# Patient Record
Sex: MALE | Race: WHITE | NOT HISPANIC OR LATINO | Employment: OTHER | URBAN - NONMETROPOLITAN AREA
[De-identification: names, ages, dates, MRNs, and addresses within clinical notes are randomized per-mention and may not be internally consistent; named-entity substitution may affect disease eponyms.]

---

## 2017-05-11 ENCOUNTER — DOCUMENTATION (OUTPATIENT)
Dept: GASTROENTEROLOGY | Facility: CLINIC | Age: 62
End: 2017-05-11

## 2017-08-13 ENCOUNTER — HOSPITAL ENCOUNTER (OUTPATIENT)
Facility: HOSPITAL | Age: 62
Setting detail: OBSERVATION
Discharge: HOME | End: 2017-08-15
Attending: INTERNAL MEDICINE | Admitting: FAMILY MEDICINE

## 2017-08-13 DIAGNOSIS — E78.2 MIXED HYPERLIPIDEMIA: Primary | ICD-10-CM

## 2017-08-13 DIAGNOSIS — E78.5 HYPERLIPIDEMIA LDL GOAL <100: ICD-10-CM

## 2017-08-13 DIAGNOSIS — I47.29 NONSUSTAINED VENTRICULAR TACHYCARDIA (HCC): ICD-10-CM

## 2017-08-13 LAB
ALBUMIN SERPL-MCNC: 4.3 G/DL (ref 3.2–4.8)
ALBUMIN/GLOB SERPL: 1.5 G/DL (ref 1.5–2.5)
ALP SERPL-CCNC: 98 U/L (ref 25–100)
ALT SERPL W P-5'-P-CCNC: 38 U/L (ref 7–40)
ANION GAP SERPL CALCULATED.3IONS-SCNC: 7 MMOL/L (ref 3–11)
APTT PPP: 28.7 SECONDS (ref 24–31)
AST SERPL-CCNC: 42 U/L (ref 0–33)
BASOPHILS # BLD AUTO: 0.03 10*3/MM3 (ref 0–0.2)
BASOPHILS NFR BLD AUTO: 0.3 % (ref 0–1)
BILIRUB SERPL-MCNC: 0.4 MG/DL (ref 0.3–1.2)
BUN BLD-MCNC: 14 MG/DL (ref 9–23)
BUN/CREAT SERPL: 15.6 (ref 7–25)
CALCIUM SPEC-SCNC: 9.4 MG/DL (ref 8.7–10.4)
CHLORIDE SERPL-SCNC: 104 MMOL/L (ref 99–109)
CO2 SERPL-SCNC: 28 MMOL/L (ref 20–31)
CREAT BLD-MCNC: 0.9 MG/DL (ref 0.6–1.3)
DEPRECATED RDW RBC AUTO: 45 FL (ref 37–54)
EOSINOPHIL # BLD AUTO: 0.19 10*3/MM3 (ref 0–0.3)
EOSINOPHIL NFR BLD AUTO: 1.9 % (ref 0–3)
ERYTHROCYTE [DISTWIDTH] IN BLOOD BY AUTOMATED COUNT: 12.8 % (ref 11.3–14.5)
GFR SERPL CREATININE-BSD FRML MDRD: 86 ML/MIN/1.73
GLOBULIN UR ELPH-MCNC: 2.8 GM/DL
GLUCOSE BLD-MCNC: 100 MG/DL (ref 70–100)
HCT VFR BLD AUTO: 46.5 % (ref 38.9–50.9)
HGB BLD-MCNC: 16.3 G/DL (ref 13.1–17.5)
IMM GRANULOCYTES # BLD: 0.05 10*3/MM3 (ref 0–0.03)
IMM GRANULOCYTES NFR BLD: 0.5 % (ref 0–0.6)
INR PPP: 0.98
LYMPHOCYTES # BLD AUTO: 2.93 10*3/MM3 (ref 0.6–4.8)
LYMPHOCYTES NFR BLD AUTO: 28.5 % (ref 24–44)
MAGNESIUM SERPL-MCNC: 2.2 MG/DL (ref 1.3–2.7)
MCH RBC QN AUTO: 33.4 PG (ref 27–31)
MCHC RBC AUTO-ENTMCNC: 35.1 G/DL (ref 32–36)
MCV RBC AUTO: 95.3 FL (ref 80–99)
MONOCYTES # BLD AUTO: 0.75 10*3/MM3 (ref 0–1)
MONOCYTES NFR BLD AUTO: 7.3 % (ref 0–12)
NEUTROPHILS # BLD AUTO: 6.32 10*3/MM3 (ref 1.5–8.3)
NEUTROPHILS NFR BLD AUTO: 61.5 % (ref 41–71)
PLATELET # BLD AUTO: 78 10*3/MM3 (ref 150–450)
POTASSIUM BLD-SCNC: 4 MMOL/L (ref 3.5–5.5)
PROT SERPL-MCNC: 7.1 G/DL (ref 5.7–8.2)
PROTHROMBIN TIME: 10.7 SECONDS (ref 9.6–11.5)
RBC # BLD AUTO: 4.88 10*6/MM3 (ref 4.2–5.76)
SODIUM BLD-SCNC: 139 MMOL/L (ref 132–146)
TROPONIN I SERPL-MCNC: <0.006 NG/ML
WBC NRBC COR # BLD: 10.27 10*3/MM3 (ref 3.5–10.8)

## 2017-08-13 PROCEDURE — 83880 ASSAY OF NATRIURETIC PEPTIDE: CPT | Performed by: INTERNAL MEDICINE

## 2017-08-13 PROCEDURE — 84484 ASSAY OF TROPONIN QUANT: CPT | Performed by: INTERNAL MEDICINE

## 2017-08-13 PROCEDURE — 93005 ELECTROCARDIOGRAM TRACING: CPT | Performed by: INTERNAL MEDICINE

## 2017-08-13 PROCEDURE — 93010 ELECTROCARDIOGRAM REPORT: CPT | Performed by: INTERNAL MEDICINE

## 2017-08-13 PROCEDURE — 80053 COMPREHEN METABOLIC PANEL: CPT | Performed by: INTERNAL MEDICINE

## 2017-08-13 PROCEDURE — 83735 ASSAY OF MAGNESIUM: CPT | Performed by: INTERNAL MEDICINE

## 2017-08-13 PROCEDURE — 80061 LIPID PANEL: CPT | Performed by: INTERNAL MEDICINE

## 2017-08-13 PROCEDURE — 85025 COMPLETE CBC W/AUTO DIFF WBC: CPT | Performed by: INTERNAL MEDICINE

## 2017-08-13 PROCEDURE — 85730 THROMBOPLASTIN TIME PARTIAL: CPT | Performed by: INTERNAL MEDICINE

## 2017-08-13 PROCEDURE — 85610 PROTHROMBIN TIME: CPT | Performed by: INTERNAL MEDICINE

## 2017-08-13 PROCEDURE — 85007 BL SMEAR W/DIFF WBC COUNT: CPT | Performed by: INTERNAL MEDICINE

## 2017-08-13 PROCEDURE — 84443 ASSAY THYROID STIM HORMONE: CPT | Performed by: INTERNAL MEDICINE

## 2017-08-13 RX ORDER — OXYCODONE AND ACETAMINOPHEN 7.5; 325 MG/1; MG/1
1 TABLET ORAL 2 TIMES DAILY
COMMUNITY
End: 2023-03-31

## 2017-08-13 NOTE — NURSING NOTE
"  ACC REVIEW REPORT: [facility]        PATIENT NAME: Hai Robbins    PATIENT ID: [id number]    BED: S 464    BED TYPE: telemetry    BED GIVEN TO: instructed to go to ER registration    TIME BED GIVEN: n/a    YOB: 1955    AGE: 62    GENDER: M    PREVIOUS ADMIT TO [facility]: no    PREVIOUS ADMISSION DATE: no    PATIENT CLASS:     TODAY'S DATE: 8/13/2017    TRANSFER DATE: direct admit    ETA: pt coming by private vehicle    TRANSFERRING FACILITY: [facility]    TRANSFERRING FACILITY PHONE # : [phone]    TRANSFERRING MD: Arlet PALACIOS    DATE/TIME REQUEST RECEIVED: 8-13-17@2431    [facility] RN: Shireen Mooney    REPORT FROM: Jamia    TIME REPORT TAKEN: 1400    DIAGNOSIS: NSVT    REASON FOR TRANSFER TO [facility]: pt request    TRANSPORTATION: private vehicle    CLINICAL REASON FOR TRANSFER TO [facility]: 62 y.o. Presented to [facility] with c/o palpitations - found to have 18 beat run of VT - had two negative troponins  Was transferred to [facility] 8/11 - pt has not had any VT or palpitations since being there  Their cardiologist recommend pt have a heart cath and pt is requesting to come to [facility]      CLINICAL INFORMATION    HEIGHT:     WEIGHT:     ALLERGIES: NKA    ELDER: no    INFECTIOUS DISEASE: no    ISOLATION: no    LAST VITAL SIGNS:  TIME: 0900  TEMP: \"afebrile\"  PULSE:65  B/P: 139/80  RESP:     LAB INFORMATION: no abnormal labs    CULTURE INFORMATION: n/a    MEDS/IV FLUIDS: #20 left hand - heparin @ 1000 units/hr -started 8/12 ~ 1500  They plan to stop the heparin prior to d/c and may remove the IV  Ws given lisinopril & toprol - unknown if these are home meds      CARDIAC SYSTEM:    CHEST PAIN: no    RATE: 65    RHYTHM: NSR    Is patient taking or has patient been given any drugs that could increase bleeding? yes  (Plavix, Brilinta, Effient, Eliquis, Xarelto, Warfarin, Integrilin, Angiomax)    DRUG: heparin     DOSE/FREQUENCY: started 8/12  No home med blood thinner    CARDIAC NOTES: " ECHO & carotid US was done - results are pending      RESPIRATORY SYSTEM:    LUNG SOUNDS: clear    OXYGEN: no    O2 SAT: 100% on R      CNS/MUSCULOSKELETAL    ALERT AND ORIENTED: yes    CNS/MUSCULOSKELETAL NOTES: drinks alcohol daily; no signs of withdrawal at this time  Pt is ambulatory    PAST MEDICAL HISTORY: herniated disc, smoker, ETOH    OTHER SYMPTOM NOTES: c/o back pain - takes percocet 7.5mg bid    ADDITIONAL NOTES: after getting report & acceptance, the nurse said pt will come by private vehicle          Patricia Mooney RN  8/13/2017  2:12 PM

## 2017-08-14 ENCOUNTER — APPOINTMENT (OUTPATIENT)
Dept: CARDIOLOGY | Facility: HOSPITAL | Age: 62
End: 2017-08-14

## 2017-08-14 PROBLEM — E78.2 MIXED HYPERLIPIDEMIA: Status: ACTIVE | Noted: 2017-08-14

## 2017-08-14 PROBLEM — I10 ESSENTIAL HYPERTENSION: Status: ACTIVE | Noted: 2017-08-14

## 2017-08-14 PROBLEM — R09.89 BILATERAL CAROTID BRUITS: Status: ACTIVE | Noted: 2017-08-14

## 2017-08-14 PROBLEM — I47.29 NONSUSTAINED VENTRICULAR TACHYCARDIA: Status: ACTIVE | Noted: 2017-08-14

## 2017-08-14 PROBLEM — I35.9 AORTIC VALVE DISEASE: Status: ACTIVE | Noted: 2017-08-14

## 2017-08-14 PROBLEM — Z72.0 TOBACCO ABUSE: Status: ACTIVE | Noted: 2017-08-14

## 2017-08-14 PROBLEM — E78.5 HYPERLIPIDEMIA LDL GOAL <100: Status: ACTIVE | Noted: 2017-08-14

## 2017-08-14 PROBLEM — R01.1 HEART MURMUR ON PHYSICAL EXAMINATION: Status: ACTIVE | Noted: 2017-08-14

## 2017-08-14 LAB
ARTICHOKE IGE QN: 101 MG/DL (ref 0–130)
BH CV ECHO MEAS - BSA(HAYCOCK): 2 M^2
BH CV ECHO MEAS - BSA: 2 M^2
BH CV ECHO MEAS - BZI_BMI: 28.3 KILOGRAMS/M^2
BH CV ECHO MEAS - BZI_METRIC_HEIGHT: 172.7 CM
BH CV ECHO MEAS - BZI_METRIC_WEIGHT: 84.4 KG
BH CV XLRA MEAS LEFT DIST CCA EDV: 13.8 CM/SEC
BH CV XLRA MEAS LEFT DIST CCA PSV: 74.8 CM/SEC
BH CV XLRA MEAS LEFT DIST ICA EDV: 22 CM/SEC
BH CV XLRA MEAS LEFT DIST ICA PSV: 94.3 CM/SEC
BH CV XLRA MEAS LEFT ICA/CCA RATIO: 1
BH CV XLRA MEAS LEFT MID ICA EDV: 23.3 CM/SEC
BH CV XLRA MEAS LEFT MID ICA PSV: 74.2 CM/SEC
BH CV XLRA MEAS LEFT PROX CCA EDV: 12.6 CM/SEC
BH CV XLRA MEAS LEFT PROX CCA PSV: 99.3 CM/SEC
BH CV XLRA MEAS LEFT PROX ECA EDV: 8.2 CM/SEC
BH CV XLRA MEAS LEFT PROX ECA PSV: 59.1 CM/SEC
BH CV XLRA MEAS LEFT PROX ICA EDV: 15.7 CM/SEC
BH CV XLRA MEAS LEFT PROX ICA PSV: 59.7 CM/SEC
BH CV XLRA MEAS LEFT PROX SCLA EDV: 0 CM/SEC
BH CV XLRA MEAS LEFT PROX SCLA PSV: 84.9 CM/SEC
BH CV XLRA MEAS LEFT VERTEBRAL A EDV: 9.4 CM/SEC
BH CV XLRA MEAS LEFT VERTEBRAL A PSV: 42.7 CM/SEC
BH CV XLRA MEAS RIGHT DIST CCA EDV: 17.3 CM/SEC
BH CV XLRA MEAS RIGHT DIST CCA PSV: 89.6 CM/SEC
BH CV XLRA MEAS RIGHT DIST ICA EDV: 32.1 CM/SEC
BH CV XLRA MEAS RIGHT DIST ICA PSV: 104.3 CM/SEC
BH CV XLRA MEAS RIGHT ICA/CCA RATIO: 0.8
BH CV XLRA MEAS RIGHT MID ICA EDV: 23.9 CM/SEC
BH CV XLRA MEAS RIGHT MID ICA PSV: 69.1 CM/SEC
BH CV XLRA MEAS RIGHT PROX CCA EDV: 14.1 CM/SEC
BH CV XLRA MEAS RIGHT PROX CCA PSV: 109.2 CM/SEC
BH CV XLRA MEAS RIGHT PROX ECA EDV: 0 CM/SEC
BH CV XLRA MEAS RIGHT PROX ECA PSV: 161.6 CM/SEC
BH CV XLRA MEAS RIGHT PROX ICA EDV: 13.8 CM/SEC
BH CV XLRA MEAS RIGHT PROX ICA PSV: 63.5 CM/SEC
BH CV XLRA MEAS RIGHT PROX SCLA EDV: 0 CM/SEC
BH CV XLRA MEAS RIGHT PROX SCLA PSV: 136.7 CM/SEC
BH CV XLRA MEAS RIGHT VERTEBRAL A EDV: 12.6 CM/SEC
BH CV XLRA MEAS RIGHT VERTEBRAL A PSV: 60.3 CM/SEC
BNP SERPL-MCNC: 131 PG/ML (ref 0–100)
CHOLEST SERPL-MCNC: 155 MG/DL (ref 0–200)
HDLC SERPL-MCNC: 43 MG/DL (ref 40–60)
LARGE PLATELETS: NORMAL
RBC MORPH BLD: NORMAL
TRIGL SERPL-MCNC: 237 MG/DL (ref 0–150)
TROPONIN I SERPL-MCNC: <0.006 NG/ML
TROPONIN I SERPL-MCNC: <0.006 NG/ML
TSH SERPL DL<=0.05 MIU/L-ACNC: 3.55 MIU/ML (ref 0.35–5.35)
WBC MORPH BLD: NORMAL

## 2017-08-14 PROCEDURE — G0378 HOSPITAL OBSERVATION PER HR: HCPCS

## 2017-08-14 PROCEDURE — 96374 THER/PROPH/DIAG INJ IV PUSH: CPT

## 2017-08-14 PROCEDURE — 93880 EXTRACRANIAL BILAT STUDY: CPT | Performed by: INTERNAL MEDICINE

## 2017-08-14 PROCEDURE — 0 IOPAMIDOL PER 1 ML: Performed by: INTERNAL MEDICINE

## 2017-08-14 PROCEDURE — 25010000002 HEPARIN (PORCINE) PER 1000 UNITS: Performed by: NURSE PRACTITIONER

## 2017-08-14 PROCEDURE — C1769 GUIDE WIRE: HCPCS | Performed by: INTERNAL MEDICINE

## 2017-08-14 PROCEDURE — C8929 TTE W OR WO FOL WCON,DOPPLER: HCPCS

## 2017-08-14 PROCEDURE — 93306 TTE W/DOPPLER COMPLETE: CPT | Performed by: INTERNAL MEDICINE

## 2017-08-14 PROCEDURE — 93880 EXTRACRANIAL BILAT STUDY: CPT

## 2017-08-14 PROCEDURE — 84484 ASSAY OF TROPONIN QUANT: CPT | Performed by: INTERNAL MEDICINE

## 2017-08-14 PROCEDURE — 25010000002 HEPARIN (PORCINE) PER 1000 UNITS: Performed by: INTERNAL MEDICINE

## 2017-08-14 PROCEDURE — 25010000002 SULFUR HEXAFLUORIDE MICROSPH 60.7-25 MG RECONSTITUTED SUSPENSION: Performed by: FAMILY MEDICINE

## 2017-08-14 PROCEDURE — 25010000002 HEPARIN (PORCINE) PER 1000 UNITS

## 2017-08-14 PROCEDURE — 93458 L HRT ARTERY/VENTRICLE ANGIO: CPT | Performed by: INTERNAL MEDICINE

## 2017-08-14 PROCEDURE — 25010000002 FENTANYL CITRATE (PF) 100 MCG/2ML SOLUTION: Performed by: INTERNAL MEDICINE

## 2017-08-14 PROCEDURE — 99219 PR INITIAL OBSERVATION CARE/DAY 50 MINUTES: CPT | Performed by: INTERNAL MEDICINE

## 2017-08-14 PROCEDURE — C1894 INTRO/SHEATH, NON-LASER: HCPCS | Performed by: INTERNAL MEDICINE

## 2017-08-14 PROCEDURE — 99214 OFFICE O/P EST MOD 30 MIN: CPT | Performed by: INTERNAL MEDICINE

## 2017-08-14 PROCEDURE — 25010000002 MIDAZOLAM PER 1 MG: Performed by: INTERNAL MEDICINE

## 2017-08-14 RX ORDER — ASPIRIN 81 MG/1
81 TABLET ORAL DAILY
Status: DISCONTINUED | OUTPATIENT
Start: 2017-08-15 | End: 2017-08-15 | Stop reason: HOSPADM

## 2017-08-14 RX ORDER — MIDAZOLAM HYDROCHLORIDE 1 MG/ML
INJECTION INTRAMUSCULAR; INTRAVENOUS AS NEEDED
Status: DISCONTINUED | OUTPATIENT
Start: 2017-08-14 | End: 2017-08-14 | Stop reason: HOSPADM

## 2017-08-14 RX ORDER — LIDOCAINE HYDROCHLORIDE 10 MG/ML
INJECTION, SOLUTION INFILTRATION; PERINEURAL AS NEEDED
Status: DISCONTINUED | OUTPATIENT
Start: 2017-08-14 | End: 2017-08-14 | Stop reason: HOSPADM

## 2017-08-14 RX ORDER — ONDANSETRON 2 MG/ML
4 INJECTION INTRAMUSCULAR; INTRAVENOUS EVERY 6 HOURS PRN
Status: DISCONTINUED | OUTPATIENT
Start: 2017-08-14 | End: 2017-08-15 | Stop reason: HOSPADM

## 2017-08-14 RX ORDER — NITROGLYCERIN 0.4 MG/1
0.4 TABLET SUBLINGUAL
Status: DISCONTINUED | OUTPATIENT
Start: 2017-08-14 | End: 2017-08-15 | Stop reason: HOSPADM

## 2017-08-14 RX ORDER — SODIUM CHLORIDE 9 MG/ML
100 INJECTION, SOLUTION INTRAVENOUS CONTINUOUS
Status: DISCONTINUED | OUTPATIENT
Start: 2017-08-14 | End: 2017-08-15 | Stop reason: HOSPADM

## 2017-08-14 RX ORDER — SODIUM CHLORIDE 0.9 % (FLUSH) 0.9 %
1-10 SYRINGE (ML) INJECTION AS NEEDED
Status: DISCONTINUED | OUTPATIENT
Start: 2017-08-14 | End: 2017-08-15 | Stop reason: HOSPADM

## 2017-08-14 RX ORDER — ONDANSETRON 4 MG/1
4 TABLET, FILM COATED ORAL EVERY 6 HOURS PRN
Status: DISCONTINUED | OUTPATIENT
Start: 2017-08-14 | End: 2017-08-15 | Stop reason: HOSPADM

## 2017-08-14 RX ORDER — FAMOTIDINE 20 MG/1
20 TABLET, FILM COATED ORAL 2 TIMES DAILY
Status: DISCONTINUED | OUTPATIENT
Start: 2017-08-14 | End: 2017-08-15 | Stop reason: HOSPADM

## 2017-08-14 RX ORDER — ACETAMINOPHEN 325 MG/1
650 TABLET ORAL EVERY 6 HOURS PRN
Status: DISCONTINUED | OUTPATIENT
Start: 2017-08-14 | End: 2017-08-15 | Stop reason: HOSPADM

## 2017-08-14 RX ORDER — HEPARIN SODIUM 1000 [USP'U]/ML
4000 INJECTION, SOLUTION INTRAVENOUS; SUBCUTANEOUS ONCE
Status: COMPLETED | OUTPATIENT
Start: 2017-08-14 | End: 2017-08-14

## 2017-08-14 RX ORDER — ATORVASTATIN CALCIUM 20 MG/1
20 TABLET, FILM COATED ORAL NIGHTLY
Status: DISCONTINUED | OUTPATIENT
Start: 2017-08-14 | End: 2017-08-15 | Stop reason: HOSPADM

## 2017-08-14 RX ORDER — FENTANYL CITRATE 50 UG/ML
INJECTION, SOLUTION INTRAMUSCULAR; INTRAVENOUS AS NEEDED
Status: DISCONTINUED | OUTPATIENT
Start: 2017-08-14 | End: 2017-08-14 | Stop reason: HOSPADM

## 2017-08-14 RX ORDER — SODIUM CHLORIDE 9 MG/ML
INJECTION, SOLUTION INTRAVENOUS CONTINUOUS PRN
Status: DISCONTINUED | OUTPATIENT
Start: 2017-08-14 | End: 2017-08-14 | Stop reason: HOSPADM

## 2017-08-14 RX ORDER — OXYCODONE AND ACETAMINOPHEN 7.5; 325 MG/1; MG/1
1 TABLET ORAL 2 TIMES DAILY
Status: DISCONTINUED | OUTPATIENT
Start: 2017-08-14 | End: 2017-08-15 | Stop reason: HOSPADM

## 2017-08-14 RX ORDER — DOCUSATE SODIUM 100 MG/1
100 CAPSULE, LIQUID FILLED ORAL 2 TIMES DAILY
Status: DISCONTINUED | OUTPATIENT
Start: 2017-08-14 | End: 2017-08-15 | Stop reason: HOSPADM

## 2017-08-14 RX ORDER — HEPARIN SODIUM 10000 [USP'U]/100ML
12 INJECTION, SOLUTION INTRAVENOUS
Status: DISCONTINUED | OUTPATIENT
Start: 2017-08-14 | End: 2017-08-14

## 2017-08-14 RX ORDER — HEPARIN SODIUM 1000 [USP'U]/ML
INJECTION, SOLUTION INTRAVENOUS; SUBCUTANEOUS AS NEEDED
Status: DISCONTINUED | OUTPATIENT
Start: 2017-08-14 | End: 2017-08-14 | Stop reason: HOSPADM

## 2017-08-14 RX ORDER — ASPIRIN 81 MG/1
324 TABLET, CHEWABLE ORAL ONCE
Status: COMPLETED | OUTPATIENT
Start: 2017-08-14 | End: 2017-08-14

## 2017-08-14 RX ADMIN — SULFUR HEXAFLUORIDE 3 ML: KIT at 14:30

## 2017-08-14 RX ADMIN — ASPIRIN 81 MG 324 MG: 81 TABLET ORAL at 21:53

## 2017-08-14 RX ADMIN — OXYCODONE HYDROCHLORIDE AND ACETAMINOPHEN 1 TABLET: 7.5; 325 TABLET ORAL at 21:54

## 2017-08-14 RX ADMIN — HEPARIN SODIUM 4000 UNITS: 1000 INJECTION, SOLUTION INTRAVENOUS; SUBCUTANEOUS at 11:45

## 2017-08-14 RX ADMIN — METOPROLOL TARTRATE 12.5 MG: 25 TABLET, FILM COATED ORAL at 21:51

## 2017-08-14 RX ADMIN — SODIUM CHLORIDE 100 ML/HR: 9 INJECTION, SOLUTION INTRAVENOUS at 01:22

## 2017-08-14 RX ADMIN — HEPARIN SODIUM AND DEXTROSE 12 UNITS/KG/HR: 10000; 5 INJECTION INTRAVENOUS at 11:44

## 2017-08-14 RX ADMIN — METOPROLOL TARTRATE 12.5 MG: 25 TABLET, FILM COATED ORAL at 01:21

## 2017-08-14 RX ADMIN — SODIUM CHLORIDE 100 ML/HR: 9 INJECTION, SOLUTION INTRAVENOUS at 07:33

## 2017-08-14 RX ADMIN — DOCUSATE SODIUM 100 MG: 100 CAPSULE, LIQUID FILLED ORAL at 09:35

## 2017-08-14 RX ADMIN — SODIUM CHLORIDE 100 ML/HR: 9 INJECTION, SOLUTION INTRAVENOUS at 21:53

## 2017-08-14 RX ADMIN — FAMOTIDINE 20 MG: 20 TABLET ORAL at 09:35

## 2017-08-14 RX ADMIN — METOPROLOL TARTRATE 12.5 MG: 25 TABLET, FILM COATED ORAL at 09:35

## 2017-08-14 RX ADMIN — ATORVASTATIN CALCIUM 20 MG: 20 TABLET, FILM COATED ORAL at 21:53

## 2017-08-14 RX ADMIN — FAMOTIDINE 20 MG: 20 TABLET ORAL at 21:51

## 2017-08-14 NOTE — PROGRESS NOTES
H&P by partner (Dr. Guerra) reviewed.  Cards official consult pending, [facility] planned (per order for case request). Continue to monitor on tele.       Active Hospital Problems (** Indicates Principal Problem)    Diagnosis Date Noted   • Nonsustained ventricular tachycardia [I47.2] 08/14/2017   • Essential hypertension [I10] 08/14/2017   • Bilateral carotid bruits [R09.89] 08/14/2017   • Tobacco abuse [Z72.0] 08/14/2017   • Heart murmur on physical examination [R01.1] 08/14/2017   • Mixed hyperlipidemia [E78.2] 08/14/2017      Resolved Hospital Problems    Diagnosis Date Noted Date Resolved   No resolved problems to display.             Nataliya Wheatley MD  1:09 PM  08/14/17

## 2017-08-14 NOTE — PLAN OF CARE
Problem: Patient Care Overview (Adult)  Goal: Plan of Care Review  Outcome: Ongoing (interventions implemented as appropriate)    08/14/17 0353   Coping/Psychosocial Response Interventions   Plan Of Care Reviewed With patient   Patient Care Overview   Progress progress toward functional goals as expected         Problem: Cardiac Output, Decreased (Adult)  Goal: Identify Related Risk Factors and Signs and Symptoms  Outcome: Ongoing (interventions implemented as appropriate)

## 2017-08-14 NOTE — CONSULTS
"[facility] at [facility]  Cardiovascular Consultation Note           62-year-old   white male presents to [facility] ED at 8/11/17 with complaints of palpitations associated with dizziness, diaphoresis.  He denies any presyncope, syncope, chest pain, dyspnea, fever, chills, nausea.  On workup he was found to be in nonsustained V. Tach with systolic blood pressures in the 180s.  He had an 18 beat run of nonsustained V. Tach-data deficit. He denies having to be defibrillated.  He was transferred to [facility], and then when they talked about doing a heart catheterization on him, he preferred to be sent to [facility] for evaluation.  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  At that time he denied any weakness, dysphonia, facial drooping, dysphagia.  At that time he did not seek medical treatment.  He has known hypertension, hyperlipidemia, peptic ulcer disease, chronic thrombocytopenia, and smoked 2-3 packs per day ×40 years.  He states that he quit around August 4, 2017.  In addition he drinks 3-4 drinks per night of liquor.  He denies any prior chest pain, MIs, diabetes mellitus, stress tests, heart catheterization, CVAs, TIAs, DVTs, PEs, thyroid problems, kidney issues, arrhythmias.  He notes that he has no family history of CAD.  He was told at one of the outside hospitals in the past few days that he has mild emphysema.  He has eaten this morning.    Cardiac risk factors: Smoking history, hypertension, hyperlipidemia    Past medical and surgical history, social and family history reviewed in EMR.    REVIEW OF SYSTEMS:   H&P ROS reviewed and pertinent CV ROS as noted in HPI.         Vital Sign " Min/Max for last 24 hours  Temp  Min: 98.2 °F (36.8 °C)  Max: 98.4 °F (36.9 °C)   BP  Min: 111/76  Max: 138/77   Pulse  Min: 60  Max: 71   Resp  Min: 16  Max: 20   SpO2  Min: 97 %  Max: 98 %   No Data Recorded    No intake or output data in the 24 hours ending 17 1125          EK17; NSR, normal ECG    Lab Review:   Labs reviewed in the electronic medical record.  Pertinent findings include:  Lab Results   Component Value Date    GLUCOSE 100 2017    BUN 14 2017    CREATININE 0.90 2017    EGFRIFNONA 86 2017    BCR 15.6 2017    K 4.0 2017    CO2 28.0 2017    CALCIUM 9.4 2017    ALBUMIN 4.30 2017    LABIL2 1.5 2017    AST 42 (H) 2017    ALT 38 2017     Lab Results   Component Value Date    WBC 10.27 2017    HGB 16.3 2017    HCT 46.5 2017    MCV 95.3 2017    PLT 78 (L) 2017       Lab Results   Component Value Date    CHOL 155 2017     Lab Results   Component Value Date    TRIG 237 (H) 2017     Lab Results   Component Value Date    HDL 43 2017         Hospital Problem List     Nonsustained ventricular tachycardia    Overview Addendum 2017 11:25 AM by PHILIP Garay     · Transfer from [facility] for nonsustained V. tach 17, associated with palpitations, dizziness, and mild diaphoresis, blood pressures around 180 systolic, NSVT lasted 18 beats at OSH  · [facility] +/-CBI 17; procedure, risks, complications discussed with patient and he is agreeable to proceed.  · Echocardiogram ordered         Essential hypertension    Bilateral carotid bruits    Overview Signed 2017 11:12 AM by PHILIP Garay     · Carotid duplex ordered         Tobacco abuse    Overview Signed 2017 11:06 AM by PHILIP Garay     Smoked 2-3 packs per day ×40 years, quit approximately 17         Heart murmur on physical examination    Overview Signed 2017  11:13 AM by PHILIP Garay     · Echocardiogram ordered         Mixed hyperlipidemia    Overview Addendum 8/14/2017 11:16 AM by PHILIP Garay     · Was not on statin therapy prior to admission; Will start atorvastatin 20mg daily                    · Left heart catheterization today later  · Echocardiogram  · Review carotid duplex when available  · NPO now  · Heparin gtt now  · ECG, BMP, troponin in morning  · Atorvastatin 20mg daily    Scribed for Adama Nagel MD by PHILIP Garay. 8/14/2017  11:25 AM      The patient had an episode of symptomatic NSVT per outside cardiologist.  Transferred here for heart catheterization.  No anginal symptoms per se.      I agree with the above note, exam, assessment and plan.    Emil Nagel MD  8/22/2017

## 2017-08-14 NOTE — PROGRESS NOTES
Discharge Planning Assessment  [facility]     Patient Name: Hai Robbins  MRN: [id number]  Today's Date: 8/14/2017    Admit Date: 8/13/2017          Discharge Needs Assessment       08/14/17 1048    Living Environment    Lives With significant other    Living Arrangements house    Home Accessibility no concerns    Stair Railings at Home outside, present on right side;inside, present at both sides    Type of Financial/Environmental Concern none    Transportation Available family or friend will provide    Living Environment    Provides Primary Care For no one    Quality Of Family Relationships supportive    Able to Return to Prior Living Arrangements yes    Discharge Needs Assessment    Concerns To Be Addressed no discharge needs identified    Readmission Within The Last 30 Days no previous admission in last 30 days    Anticipated Changes Related to Illness none    Equipment Currently Used at Home none    Equipment Needed After Discharge none            Discharge Plan       08/14/17 1049    Case Management/Social Work Plan    Plan Home    Patient/Family In Agreement With Plan yes    Additional Comments Spoke with patient at bedside.  Patient resides in [facility] [place] with his wife.  Prior to admission patient was independent with ADL's and mobility.  Patient does not have any DME and has never used home health. Patient denies any problems with insurance or obtaining medications.  No discharge needs identified at this time.  CM will continue to follow.        Discharge Placement     No information found        Expected Discharge Date and Time     Expected Discharge Date Expected Discharge Time    Aug 16, 2017               Demographic Summary       08/14/17 1047    Referral Information    Admission Type observation    Arrived From another healthcare institution, not defined    Referral Source admission list    Reason For Consult discharge planning    Record Reviewed clinical discipline documentation;history and  physical;medical record;patient profile    Contact Information    Permission Granted to Share Information With ;family/designee    Primary Care Physician Information    Name John Taylor            Functional Status     None            Psychosocial     None            Abuse/Neglect     None            Legal     None            Substance Abuse     None            Patient Forms     None          Sis Krishnan RN

## 2017-08-14 NOTE — H&P
[facility] Medicine History and Physical    Primary Care Physician: No Known Provider    Chief complaint   Transferred from [facility] for cardiac workup.    History of Present Illness  On 8/11-Friday, patient presented to external ER with the chief complaint of palpitations associated with dizziness, diaphoresis, no syncope-on workup was found to be in V. tach nonsustained, his blood pressure was elevated with systolic in 180s.  No associated complain of chest pain, dyspnea,    Patient also states approximately 2 weeks before this episode he had a sudden episode of dizziness felt like his right face was drawing up with some vision changes lasted for 8-10 seconds and spontaneously resolved.  Did not check his blood pressure or went to the ER.    ROS  Review of Systems   Constitutional: Negative for activity change, fatigue and fever.   HENT: Negative for ear discharge, nosebleeds, sore throat and trouble swallowing.    Eyes: Negative for discharge and visual disturbance.   Respiratory: Negative for cough, shortness of breath and wheezing.    Cardiovascular: Negative for chest pain and palpitations.   Gastrointestinal: Negative for abdominal pain, blood in stool, diarrhea and vomiting.   Endocrine: Negative for cold intolerance, polydipsia and polyuria.   Genitourinary: Negative for dysuria, flank pain and hematuria.   Musculoskeletal: Negative for joint swelling and neck stiffness.   Skin: Negative for rash and wound.   Neurological: Negative for dizziness, seizures, syncope, speech difficulty and headaches.   Hematological: Negative for adenopathy. Does not bruise/bleed easily.   Psychiatric/Behavioral: Negative for agitation and confusion. The patient is not nervous/anxious.         Otherwise complete ROS is negative except as mentioned in the HPI.    Past Medical History:     Peptic ulcer disease in the past.  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  Chronic thrombocytopenia.  Chronic back  pain.    Past Surgical History:  Debridement of left foot chemical morning the past.  History of endoscopy.    Family History:   No significant history of coronary artery disease, stroke, DM 2.    Social History:    patient was a heavy smoker until he quit on Friday.  Denies any drug use.  Usually drinks 3-4 drinks of alcohol whenever he is home, his last drink was Thursday-3-4 days back, never had any withdrawal symptoms before.    Medications:  Prescriptions Prior to Admission   Medication Sig Dispense Refill Last Dose   • oxyCODONE-acetaminophen (PERCOCET) 7.5-325 MG per tablet Take 1 tablet by mouth 2 (Two) Times a Day.        No Known Allergies        Physical Exam:    There were no vitals taken for this visit.  VITALS-   AS ABOVE.  GENERAL- Not distressed, well nourished.  RS- CTA-BL, ,  No wheezing , no crackles, good effort.  CVS- s1s2 Regular, no murmur.  ABD- soft, non tender, not distended, no organomegaly. BS good.  EXT- no edema. Pulses + .  NEURO- AAO-3, power 5/5 in all ext, no gross sensory deficit, cranial nerves intact.  EYES- Conjunctivae are normal. Pupils are equal, round, and reactive to light. No scleral icterus.   ENT- no external ear nose lesions, mucosa moist.  NECK-  No tracheal deviation present. No thyromegaly present,No cervical lymphadenopathy.  JOINTS/MSK- no deformity, no swelling.  SKIN- no rash , warm to touch.  PSYCHIATRIC-  has a normal mood and affect.Thought content normal.           Results Reviewed:  I have personally reviewed current lab, radiology, and data and agree.  Lab Results   Component Value Date    GLUCOSE 100 08/13/2017    BUN 14 08/13/2017    CREATININE 0.90 08/13/2017    EGFRIFNONA 86 08/13/2017    BCR 15.6 08/13/2017    CO2 28.0 08/13/2017    CALCIUM 9.4 08/13/2017    ALBUMIN 4.30 08/13/2017    LABIL2 1.5 08/13/2017    AST 42 (H) 08/13/2017    ALT 38 08/13/2017     Lab Results   Component Value Date    WBC 10.27 08/13/2017    HGB 16.3 08/13/2017    HCT 46.5  08/13/2017    MCV 95.3 08/13/2017    PLT 78 (L) 08/13/2017     Lab Results   Component Value Date    TROPONINI <0.006 08/13/2017       Imaging Results (last 24 hours)     ** No results found for the last 24 hours. **              OLD RECORDS REVIEW BY ME.      Assessment/Plan   Episode of nonsustained V. tach.  -No further episodes, check patient's electrolytes including magnesium, no associated chest pain and troponin ×3 negative at the external ER.  -Had echocardiogram done at the external hospital but I do not have the results.  -Cardiologic consult in the morning.  -Smoking cessation advanced.  -carotid us reslults pending from [facility].    Hypertension  -New diagnosis, we'll continue to monitor, currently heart rate is 67, and blood pressure is 148/80.  -Consider low-dose beta blocker if systolic blood pressure gets more than 160.  -EKG do not show any acute ST-T wave changes.    Alcohol use  -Patient appears stable and, will continue to monitor for withdrawal symptoms.    Peptic ulcer disease history  -No overt complaints currently.    Chronic thrombocytopenia  -No overt bleeding, we'll continue to monitor, could be secondary to chronic alcohol use.  Has seen a hematologist in past.              DVT PXL  -pranay's/scds  -lovenox  None 2nd to low plts.    I discussed the patients findings and my recommendations with: patient/family.      Ryan Guerra MD  08/14/17  12:01 AM    Please note that portions of this note may have been completed with a voice recognition program. Efforts were made to edit the dictations, but occasionally words are mistrans

## 2017-08-15 VITALS
TEMPERATURE: 99 F | DIASTOLIC BLOOD PRESSURE: 73 MMHG | BODY MASS INDEX: 28.52 KG/M2 | HEIGHT: 68 IN | WEIGHT: 188.2 LBS | HEART RATE: 84 BPM | SYSTOLIC BLOOD PRESSURE: 142 MMHG | OXYGEN SATURATION: 97 % | RESPIRATION RATE: 16 BRPM

## 2017-08-15 PROBLEM — E78.5 HYPERLIPIDEMIA LDL GOAL <100: Status: RESOLVED | Noted: 2017-08-14 | Resolved: 2017-08-15

## 2017-08-15 LAB
ANION GAP SERPL CALCULATED.3IONS-SCNC: 10 MMOL/L (ref 3–11)
BH CV ECHO MEAS - AI DEC SLOPE: 307 CM/SEC^2
BH CV ECHO MEAS - AI MAX PG: 98.8 MMHG
BH CV ECHO MEAS - AI MAX VEL: 496.9 CM/SEC
BH CV ECHO MEAS - AI P1/2T: 474.1 MSEC
BH CV ECHO MEAS - AO MAX PG (FULL): 39.4 MMHG
BH CV ECHO MEAS - AO MAX PG: 45.5 MMHG
BH CV ECHO MEAS - AO MEAN PG (FULL): 19.8 MMHG
BH CV ECHO MEAS - AO MEAN PG: 22.5 MMHG
BH CV ECHO MEAS - AO ROOT AREA (BSA CORRECTED): 1.5
BH CV ECHO MEAS - AO ROOT AREA: 6.7 CM^2
BH CV ECHO MEAS - AO ROOT DIAM: 2.9 CM
BH CV ECHO MEAS - AO V2 MAX: 337.2 CM/SEC
BH CV ECHO MEAS - AO V2 MEAN: 223.3 CM/SEC
BH CV ECHO MEAS - AO V2 VTI: 73.6 CM
BH CV ECHO MEAS - AVA(I,A): 1.2 CM^2
BH CV ECHO MEAS - AVA(I,D): 1.2 CM^2
BH CV ECHO MEAS - AVA(V,A): 1.1 CM^2
BH CV ECHO MEAS - AVA(V,D): 1.1 CM^2
BH CV ECHO MEAS - BSA(HAYCOCK): 2 M^2
BH CV ECHO MEAS - BSA: 2 M^2
BH CV ECHO MEAS - BZI_BMI: 28.3 KILOGRAMS/M^2
BH CV ECHO MEAS - BZI_METRIC_HEIGHT: 172.7 CM
BH CV ECHO MEAS - BZI_METRIC_WEIGHT: 84.4 KG
BH CV ECHO MEAS - CONTRAST EF (2CH): 54.7 ML/M^2
BH CV ECHO MEAS - CONTRAST EF 4CH: 53.9 ML/M^2
BH CV ECHO MEAS - EDV(CUBED): 144.1 ML
BH CV ECHO MEAS - EDV(MOD-SP2): 128 ML
BH CV ECHO MEAS - EDV(MOD-SP4): 128 ML
BH CV ECHO MEAS - EDV(TEICH): 132 ML
BH CV ECHO MEAS - EF(CUBED): 65.3 %
BH CV ECHO MEAS - EF(MOD-SP2): 54.7 %
BH CV ECHO MEAS - EF(MOD-SP4): 53.9 %
BH CV ECHO MEAS - EF(TEICH): 56.4 %
BH CV ECHO MEAS - ESV(CUBED): 50.1 ML
BH CV ECHO MEAS - ESV(MOD-SP2): 58 ML
BH CV ECHO MEAS - ESV(MOD-SP4): 59 ML
BH CV ECHO MEAS - ESV(TEICH): 57.6 ML
BH CV ECHO MEAS - FS: 29.7 %
BH CV ECHO MEAS - IVS/LVPW: 1.1
BH CV ECHO MEAS - IVSD: 1.2 CM
BH CV ECHO MEAS - LA DIMENSION: 3.6 CM
BH CV ECHO MEAS - LA/AO: 1.2
BH CV ECHO MEAS - LV DIASTOLIC VOL/BSA (35-75): 64.6 ML/M^2
BH CV ECHO MEAS - LV MASS(C)D: 232.7 GRAMS
BH CV ECHO MEAS - LV MASS(C)DI: 117.4 GRAMS/M^2
BH CV ECHO MEAS - LV MAX PG: 6.1 MMHG
BH CV ECHO MEAS - LV MEAN PG: 2.7 MMHG
BH CV ECHO MEAS - LV SYSTOLIC VOL/BSA (12-30): 29.8 ML/M^2
BH CV ECHO MEAS - LV V1 MAX: 123.4 CM/SEC
BH CV ECHO MEAS - LV V1 MEAN: 73.8 CM/SEC
BH CV ECHO MEAS - LV V1 VTI: 28.5 CM
BH CV ECHO MEAS - LVIDD: 5.2 CM
BH CV ECHO MEAS - LVIDS: 3.7 CM
BH CV ECHO MEAS - LVLD AP2: 8.5 CM
BH CV ECHO MEAS - LVLD AP4: 7.8 CM
BH CV ECHO MEAS - LVLS AP2: 7.5 CM
BH CV ECHO MEAS - LVLS AP4: 6.7 CM
BH CV ECHO MEAS - LVOT AREA (M): 3.1 CM^2
BH CV ECHO MEAS - LVOT AREA: 3.1 CM^2
BH CV ECHO MEAS - LVOT DIAM: 2 CM
BH CV ECHO MEAS - LVPWD: 1.1 CM
BH CV ECHO MEAS - PA ACC SLOPE: 1617 CM/SEC^2
BH CV ECHO MEAS - PA ACC TIME: 0.06 SEC
BH CV ECHO MEAS - PA PR(ACCEL): 51.7 MMHG
BH CV ECHO MEAS - PI END-D VEL: 68.4 CM/SEC
BH CV ECHO MEAS - RVDD: 3.2 CM
BH CV ECHO MEAS - SI(AO): 248.4 ML/M^2
BH CV ECHO MEAS - SI(CUBED): 47.5 ML/M^2
BH CV ECHO MEAS - SI(LVOT): 45.1 ML/M^2
BH CV ECHO MEAS - SI(MOD-SP2): 35.3 ML/M^2
BH CV ECHO MEAS - SI(MOD-SP4): 34.8 ML/M^2
BH CV ECHO MEAS - SI(TEICH): 37.6 ML/M^2
BH CV ECHO MEAS - SV(AO): 492.4 ML
BH CV ECHO MEAS - SV(CUBED): 94.1 ML
BH CV ECHO MEAS - SV(LVOT): 89.4 ML
BH CV ECHO MEAS - SV(MOD-SP2): 70 ML
BH CV ECHO MEAS - SV(MOD-SP4): 69 ML
BH CV ECHO MEAS - SV(TEICH): 74.4 ML
BH CV ECHO MEAS - TAPSE (>1.6): 2.8 CM2
BH CV VAS BP LEFT ARM: NORMAL MMHG
BH CV XLRA - RV BASE: 3.7 CM
BH CV XLRA - RV LENGTH: 6.2 CM
BH CV XLRA - RV MID: 3.2 CM
BUN BLD-MCNC: 12 MG/DL (ref 9–23)
BUN/CREAT SERPL: 15 (ref 7–25)
CALCIUM SPEC-SCNC: 9.2 MG/DL (ref 8.7–10.4)
CHLORIDE SERPL-SCNC: 104 MMOL/L (ref 99–109)
CO2 SERPL-SCNC: 27 MMOL/L (ref 20–31)
CREAT BLD-MCNC: 0.8 MG/DL (ref 0.6–1.3)
GFR SERPL CREATININE-BSD FRML MDRD: 99 ML/MIN/1.73
GLUCOSE BLD-MCNC: 101 MG/DL (ref 70–100)
LEFT ATRIUM VOLUME INDEX: 27 ML/M2
LV EF 2D ECHO EST: 60 %
POTASSIUM BLD-SCNC: 4.2 MMOL/L (ref 3.5–5.5)
SODIUM BLD-SCNC: 141 MMOL/L (ref 132–146)

## 2017-08-15 PROCEDURE — 99217 PR OBSERVATION CARE DISCHARGE MANAGEMENT: CPT | Performed by: INTERNAL MEDICINE

## 2017-08-15 PROCEDURE — 93010 ELECTROCARDIOGRAM REPORT: CPT | Performed by: INTERNAL MEDICINE

## 2017-08-15 PROCEDURE — 80048 BASIC METABOLIC PNL TOTAL CA: CPT | Performed by: NURSE PRACTITIONER

## 2017-08-15 PROCEDURE — G0378 HOSPITAL OBSERVATION PER HR: HCPCS

## 2017-08-15 PROCEDURE — 93005 ELECTROCARDIOGRAM TRACING: CPT | Performed by: NURSE PRACTITIONER

## 2017-08-15 RX ORDER — ATORVASTATIN CALCIUM 20 MG/1
20 TABLET, FILM COATED ORAL NIGHTLY
Qty: 90 TABLET | Refills: 1 | Status: SHIPPED | OUTPATIENT
Start: 2017-08-15 | End: 2017-08-15

## 2017-08-15 RX ORDER — ATORVASTATIN CALCIUM 20 MG/1
20 TABLET, FILM COATED ORAL NIGHTLY
Qty: 90 TABLET | Refills: 1 | Status: SHIPPED | OUTPATIENT
Start: 2017-08-15 | End: 2018-02-11

## 2017-08-15 RX ADMIN — OXYCODONE HYDROCHLORIDE AND ACETAMINOPHEN 1 TABLET: 7.5; 325 TABLET ORAL at 08:50

## 2017-08-15 RX ADMIN — FAMOTIDINE 20 MG: 20 TABLET ORAL at 08:50

## 2017-08-15 RX ADMIN — ASPIRIN 81 MG: 81 TABLET, COATED ORAL at 08:50

## 2017-08-15 RX ADMIN — METOPROLOL TARTRATE 12.5 MG: 25 TABLET, FILM COATED ORAL at 08:50

## 2017-08-15 NOTE — DISCHARGE SUMMARY
Date of Discharge:  8/15/2017    Hospital Problem List     * (Principal)Nonsustained ventricular tachycardia    Overview Addendum 8/14/2017  6:12 PM by Emil Nagel MD     · Symptomatic WCT (18, beats),  8/11/17  · Cardiac catheterization (date/14/17): Near normal coronary arteries.  Normal LVEF         Aortic valve disease    Overview Addendum 8/14/2017  6:13 PM by Emil Nagel MD     · Echo (8/14/17): Normal LVEF.  Functional bicuspid valve with moderate AI/AS         Essential hypertension    Bilateral carotid bruits    Overview Signed 8/14/2017 11:12 AM by PHILIP Garay     · Carotid duplex ordered         Tobacco abuse    Overview Signed 8/14/2017 11:06 AM by PHILIP Garay     Smoked 2-3 packs per day ×40 years, quit approximately 8/4/17         Hyperlipidemia LDL goal <100    Overview Addendum 8/15/2017  8:14 AM by Emil Nagel MD     · Statin therapy indicated for primary prevention             Hospital Course  Patient is a 62 y.o. male with no previous cardiac history who presented first to [facility] with palpitation symptoms.  While there, he was found to have a 18 beat run of wide complex tachycardia that was felt to be ventricular tachycardia.  The patient was then transferred to [facility] where cardiac catheterization was recommended.  The patient preferred to have his heart catheterization done at [facility] and therefore was transferred here on 8/13/17.  The patient had no arrhythmias and had a normal EKG during his hospital stay here.  The tracings of the wide-complex tachycardia were not sent with the patient and were unobtainable.  Notably, the patient has had no chest pain symptoms or symptoms of heart failure.    Cardiac catheterization was performed on 8/14/17.  The patient was found to have normal coronary arteries and normal LV systolic function.  An echocardiogram did demonstrate a functional bicuspid aortic valve with  moderate insufficiency and stenosis.    The patient strongly desired discharge on 8/15/17.  I've instructed him to go to the [facility] and obtain the tracings of wide-complex tachycardia for my review.  If symptomatic nonsustained VT is confirmed, I will refer him for EP evaluation and possibly cardiac MRI given his structurally normal heart.  He will be discharged home on metoprolol and low-dose atorvastatin for primary prevention.  Tobacco cessation was recommended.      Procedures Performed  Procedure(s):  Left Heart Cath       Pertinent Test Results:  Lab Results   Component Value Date    GLUCOSE 101 (H) 08/15/2017    BUN 12 08/15/2017    CREATININE 0.80 08/15/2017    EGFRIFNONA 99 08/15/2017    BCR 15.0 08/15/2017    K 4.2 08/15/2017    CO2 27.0 08/15/2017    CALCIUM 9.2 08/15/2017    ALBUMIN 4.30 08/13/2017    LABIL2 1.5 08/13/2017    AST 42 (H) 08/13/2017    ALT 38 08/13/2017     Lab Results   Component Value Date    CHOL 155 08/13/2017    TRIG 237 (H) 08/13/2017    HDL 43 08/13/2017    LDLDIRECT 101 08/13/2017    AST 42 (H) 08/13/2017    ALT 38 08/13/2017     Lab Results   Component Value Date    WBC 10.27 08/13/2017    HGB 16.3 08/13/2017    HCT 46.5 08/13/2017    MCV 95.3 08/13/2017    PLT 78 (L) 08/13/2017         Echo EF Estimated  Lab Results   Component Value Date    ECHOEFEST 60 08/14/2017       Condition on Discharge: stable    Physical Exam at Discharge    Vital Signs  Temp:  [97.8 °F (36.6 °C)-99 °F (37.2 °C)] 99 °F (37.2 °C)  Heart Rate:  [59-79] 63  Resp:  [16-18] 16  BP: (114-192)/(62-83) 131/62    Physical Exam   Constitutional: He is oriented to person, place, and time. He appears well-developed and well-nourished.   HENT:   Head: Normocephalic and atraumatic.   Eyes: No scleral icterus.   Neck: No JVD present. No thyromegaly present.   Cardiovascular: Normal rate and regular rhythm.    Murmur heard.   Systolic murmur is present with a grade of 2/6    Diastolic murmur is present with a  grade of 2/6   Pulmonary/Chest: Effort normal and breath sounds normal.   Abdominal: Soft. He exhibits no distension.   Neurological: He is alert and oriented to person, place, and time.   Skin: Skin is warm and dry.   Psychiatric: He has a normal mood and affect. His behavior is normal.         Discharge Disposition  Home or Self Care    Discharge Medications   Hai Robbins   Home Medication Instructions NIGHAT:[id number]    Printed on:08/15/17 0816   Medication Information                      atorvastatin (LIPITOR) 20 MG tablet  Take 1 tablet by mouth Every Night for 180 days.             metoprolol tartrate (LOPRESSOR) 25 MG tablet  Take 1 tablet by mouth 2 (Two) Times a Day for 360 days.             oxyCODONE-acetaminophen (PERCOCET) 7.5-325 MG per tablet  Take 1 tablet by mouth 2 (Two) Times a Day.                 Discharge Diet: AHA    Activity at Discharge: ad leslie    Follow-up Appointments  No future appointments.  Additional Instructions for the Follow-ups that You Need to Schedule     Discharge Follow-up with Specialty    As directed    Specialty:  Adama Nagel MD   Follow Up:  1 Month   Follow Up Details:  Hosptial followup for NSVT                 Test Results Pending at Discharge       Emil Nagel MD  08/15/17  8:16 AM    Time: Discharge 25 min

## 2017-08-15 NOTE — PLAN OF CARE
Problem: Patient Care Overview (Adult)  Goal: Plan of Care Review  Outcome: Ongoing (interventions implemented as appropriate)    08/15/17 6052   Coping/Psychosocial Response Interventions   Plan Of Care Reviewed With patient   Patient Care Overview   Progress progress toward functional goals as expected   Outcome Evaluation   Outcome Summary/Follow up Plan VSS stable. SR occasional PAC's on the monitor. Cath site WNL. Pt rested well. No other complains/.         Problem: Cardiac Output, Decreased (Adult)  Goal: Identify Related Risk Factors and Signs and Symptoms  Outcome: Ongoing (interventions implemented as appropriate)  Goal: Adequate Cardiac Output/Effective Tissue Perfusion  Outcome: Ongoing (interventions implemented as appropriate)

## 2017-08-15 NOTE — DISCHARGE INSTR - APPOINTMENTS
Please schedule an appointment with your primary care physician within 1-2 week from discharge for a follow-up.

## 2017-08-23 ENCOUNTER — TELEPHONE (OUTPATIENT)
Dept: CARDIOLOGY | Facility: CLINIC | Age: 62
End: 2017-08-23

## 2017-08-23 DIAGNOSIS — I47.29 NONSUSTAINED VENTRICULAR TACHYCARDIA (HCC): Primary | ICD-10-CM

## 2017-08-23 NOTE — TELEPHONE ENCOUNTER
----- Message from Darcy Hdz RN sent at 8/22/2017 10:33 AM EDT -----  Regarding: Cardiac MRI ASAP  He said he would get cardiac MRI ASAP and probably EPS+/ICD.  Do you want me to call pt or me?  I was not sure how you handle these things.

## 2017-08-23 NOTE — TELEPHONE ENCOUNTER
I spoke with the patient on the phone.  We received his telemetry strips from the outside hospital.  These strips of been reviewed by Dr. Rowe as well.  The strips show a 18 beat run of nonsustained VT.  According to the patient, he had an episode lasting at least 3 times as long prior to his presentation to the hospital.  To recap, the patient has had a cardiac catheterization with no obstructive coronary disease and an echocardiogram showing preserved LV systolic function.    I discussed with the patient our plans.  I would like for him to undergo cardiac MRI at the [facility] to evaluate further structural abnormalities i.e. ARVD.  Additionally, Dr. Rowe will want to perform an EP study based on results of the MRI.    We will attempt to arrange a cardiac MRI ASAP and proceed from there.  Patient understands the the assessment and plan and agrees to proceed.  I've advised him to continue taking metoprolol.    Emil Nagel MD  8/23/2017

## 2017-09-04 DIAGNOSIS — I47.20 VT (VENTRICULAR TACHYCARDIA) (HCC): Primary | ICD-10-CM

## 2017-09-11 ENCOUNTER — TELEPHONE (OUTPATIENT)
Dept: CARDIOLOGY | Facility: HOSPITAL | Age: 62
End: 2017-09-11

## 2017-09-11 DIAGNOSIS — I47.29 NONSUSTAINED VENTRICULAR TACHYCARDIA (HCC): Primary | ICD-10-CM

## 2017-09-13 NOTE — TELEPHONE ENCOUNTER
I spoke to the patient regarding his MRI results.  Additionally, I discussed with him the rationale for proceeding with EP study.  He is agreeable to proceeding and we'll be looking forward to hearing from Dr. Rowe .    Emil Nagel MD  9/13/2017

## 2017-10-30 ENCOUNTER — PREP FOR SURGERY (OUTPATIENT)
Dept: OTHER | Facility: HOSPITAL | Age: 62
End: 2017-10-30

## 2017-10-30 DIAGNOSIS — I47.29 VENTRICULAR TACHYCARDIA (PAROXYSMAL) (HCC): Primary | ICD-10-CM

## 2017-10-30 RX ORDER — CEFAZOLIN SODIUM 2 G/100ML
2 INJECTION, SOLUTION INTRAVENOUS ONCE
Status: CANCELLED | OUTPATIENT
Start: 2017-10-30

## 2017-10-30 RX ORDER — ACETAMINOPHEN 325 MG/1
650 TABLET ORAL EVERY 4 HOURS PRN
Status: CANCELLED | OUTPATIENT
Start: 2017-10-30

## 2017-10-30 RX ORDER — ONDANSETRON 2 MG/ML
4 INJECTION INTRAMUSCULAR; INTRAVENOUS EVERY 6 HOURS PRN
Status: CANCELLED | OUTPATIENT
Start: 2017-10-30

## 2017-10-30 RX ORDER — SODIUM CHLORIDE 0.9 % (FLUSH) 0.9 %
1-10 SYRINGE (ML) INJECTION AS NEEDED
Status: CANCELLED | OUTPATIENT
Start: 2017-10-30

## 2017-11-12 ENCOUNTER — APPOINTMENT (OUTPATIENT)
Dept: PREADMISSION TESTING | Facility: HOSPITAL | Age: 62
End: 2017-11-12

## 2017-12-03 ENCOUNTER — APPOINTMENT (OUTPATIENT)
Dept: PREADMISSION TESTING | Facility: HOSPITAL | Age: 62
End: 2017-12-03

## 2017-12-03 DIAGNOSIS — I47.29 VENTRICULAR TACHYCARDIA (PAROXYSMAL) (HCC): ICD-10-CM

## 2017-12-03 LAB
ALBUMIN SERPL-MCNC: 4.4 G/DL (ref 3.2–4.8)
ALBUMIN/GLOB SERPL: 1.6 G/DL (ref 1.5–2.5)
ALP SERPL-CCNC: 112 U/L (ref 25–100)
ALT SERPL W P-5'-P-CCNC: 24 U/L (ref 7–40)
ANION GAP SERPL CALCULATED.3IONS-SCNC: 4 MMOL/L (ref 3–11)
AST SERPL-CCNC: 22 U/L (ref 0–33)
BILIRUB SERPL-MCNC: 0.7 MG/DL (ref 0.3–1.2)
BUN BLD-MCNC: 17 MG/DL (ref 9–23)
BUN/CREAT SERPL: 21.3 (ref 7–25)
CALCIUM SPEC-SCNC: 9.6 MG/DL (ref 8.7–10.4)
CHLORIDE SERPL-SCNC: 106 MMOL/L (ref 99–109)
CO2 SERPL-SCNC: 27 MMOL/L (ref 20–31)
CREAT BLD-MCNC: 0.8 MG/DL (ref 0.6–1.3)
DEPRECATED RDW RBC AUTO: 46.2 FL (ref 37–54)
ERYTHROCYTE [DISTWIDTH] IN BLOOD BY AUTOMATED COUNT: 13.7 % (ref 11.3–14.5)
GFR SERPL CREATININE-BSD FRML MDRD: 99 ML/MIN/1.73
GLOBULIN UR ELPH-MCNC: 2.8 GM/DL
GLUCOSE BLD-MCNC: 90 MG/DL (ref 70–100)
HCT VFR BLD AUTO: 47.3 % (ref 38.9–50.9)
HGB BLD-MCNC: 16.2 G/DL (ref 13.1–17.5)
INR PPP: 1.03
MAGNESIUM SERPL-MCNC: 2.3 MG/DL (ref 1.3–2.7)
MCH RBC QN AUTO: 31.5 PG (ref 27–31)
MCHC RBC AUTO-ENTMCNC: 34.2 G/DL (ref 32–36)
MCV RBC AUTO: 91.8 FL (ref 80–99)
PLATELET # BLD AUTO: 97 10*3/MM3 (ref 150–450)
POTASSIUM BLD-SCNC: 4.5 MMOL/L (ref 3.5–5.5)
PROT SERPL-MCNC: 7.2 G/DL (ref 5.7–8.2)
PROTHROMBIN TIME: 11.2 SECONDS (ref 9.6–11.5)
RBC # BLD AUTO: 5.15 10*6/MM3 (ref 4.2–5.76)
SODIUM BLD-SCNC: 137 MMOL/L (ref 132–146)
WBC NRBC COR # BLD: 9.78 10*3/MM3 (ref 3.5–10.8)

## 2017-12-03 RX ORDER — LISINOPRIL 10 MG/1
20 TABLET ORAL DAILY
COMMUNITY
End: 2022-06-17 | Stop reason: SDUPTHER

## 2017-12-03 NOTE — DISCHARGE INSTRUCTIONS
The following instructions given during Pre Admission Testing visit:    Do not eat or drink anything after MN except for sips of water with your a.m. Prescription meds unless otherwise instructed by your physician.    Glasses and jewelry may be worn, but dentures must be removed prior to cath/procedure.    Leave any items you consider valuable at home.    Family members may wait in [facility] waiting area during procedure.    Bring all medications in their original containers the day of procedure.    Bring photo ID and insurance cards on the day of procedure.    Need to make arrangements for transportation prior to discharge.    The following handouts were given:     Heart Cath pathway (if applicable)   Cardiac Cath booklet published by Erin    OR appropriate [facility] procedure booklet    If applicable, pt instructed to bring CPAP mask and tubing the day of procedure.

## 2017-12-04 ENCOUNTER — HOSPITAL ENCOUNTER (OUTPATIENT)
Facility: HOSPITAL | Age: 62
Setting detail: HOSPITAL OUTPATIENT SURGERY
Discharge: HOME | End: 2017-12-04
Attending: INTERNAL MEDICINE | Admitting: INTERNAL MEDICINE

## 2017-12-04 VITALS
RESPIRATION RATE: 13 BRPM | WEIGHT: 192.68 LBS | OXYGEN SATURATION: 95 % | BODY MASS INDEX: 29.2 KG/M2 | HEIGHT: 68 IN | DIASTOLIC BLOOD PRESSURE: 42 MMHG | HEART RATE: 59 BPM | TEMPERATURE: 97.5 F | SYSTOLIC BLOOD PRESSURE: 101 MMHG

## 2017-12-04 DIAGNOSIS — I47.29 VENTRICULAR TACHYCARDIA (PAROXYSMAL) (HCC): ICD-10-CM

## 2017-12-04 PROCEDURE — 25010000002 DIPHENHYDRAMINE PER 50 MG: Performed by: INTERNAL MEDICINE

## 2017-12-04 PROCEDURE — 93623 PRGRMD STIMJ&PACG IV RX NFS: CPT | Performed by: INTERNAL MEDICINE

## 2017-12-04 PROCEDURE — 25010000002 MIDAZOLAM PER 1 MG: Performed by: INTERNAL MEDICINE

## 2017-12-04 PROCEDURE — 99152 MOD SED SAME PHYS/QHP 5/>YRS: CPT | Performed by: INTERNAL MEDICINE

## 2017-12-04 PROCEDURE — 93620 COMP EP EVL R AT VEN PAC&REC: CPT | Performed by: INTERNAL MEDICINE

## 2017-12-04 PROCEDURE — 25010000002 ONDANSETRON PER 1 MG: Performed by: INTERNAL MEDICINE

## 2017-12-04 PROCEDURE — C1894 INTRO/SHEATH, NON-LASER: HCPCS | Performed by: INTERNAL MEDICINE

## 2017-12-04 PROCEDURE — 93005 ELECTROCARDIOGRAM TRACING: CPT | Performed by: INTERNAL MEDICINE

## 2017-12-04 PROCEDURE — C1730 CATH, EP, 19 OR FEW ELECT: HCPCS | Performed by: INTERNAL MEDICINE

## 2017-12-04 PROCEDURE — 25010000002 EPINEPHRINE PER 0.1 MG: Performed by: INTERNAL MEDICINE

## 2017-12-04 PROCEDURE — 25010000002 FENTANYL CITRATE (PF) 100 MCG/2ML SOLUTION: Performed by: INTERNAL MEDICINE

## 2017-12-04 RX ORDER — ONDANSETRON 2 MG/ML
4 INJECTION INTRAMUSCULAR; INTRAVENOUS EVERY 6 HOURS PRN
Status: DISCONTINUED | OUTPATIENT
Start: 2017-12-04 | End: 2017-12-04 | Stop reason: HOSPADM

## 2017-12-04 RX ORDER — CEFAZOLIN SODIUM 2 G/100ML
2 INJECTION, SOLUTION INTRAVENOUS ONCE
Status: DISCONTINUED | OUTPATIENT
Start: 2017-12-04 | End: 2017-12-04 | Stop reason: HOSPADM

## 2017-12-04 RX ORDER — ACETAMINOPHEN 325 MG/1
650 TABLET ORAL EVERY 4 HOURS PRN
Status: DISCONTINUED | OUTPATIENT
Start: 2017-12-04 | End: 2017-12-04 | Stop reason: HOSPADM

## 2017-12-04 RX ORDER — DIPHENHYDRAMINE HYDROCHLORIDE 50 MG/ML
INJECTION INTRAMUSCULAR; INTRAVENOUS AS NEEDED
Status: DISCONTINUED | OUTPATIENT
Start: 2017-12-04 | End: 2017-12-04 | Stop reason: HOSPADM

## 2017-12-04 RX ORDER — ONDANSETRON 2 MG/ML
INJECTION INTRAMUSCULAR; INTRAVENOUS AS NEEDED
Status: DISCONTINUED | OUTPATIENT
Start: 2017-12-04 | End: 2017-12-04 | Stop reason: HOSPADM

## 2017-12-04 RX ORDER — HYDROCODONE BITARTRATE AND ACETAMINOPHEN 5; 325 MG/1; MG/1
1 TABLET ORAL EVERY 4 HOURS PRN
Status: DISCONTINUED | OUTPATIENT
Start: 2017-12-04 | End: 2017-12-04 | Stop reason: HOSPADM

## 2017-12-04 RX ORDER — ASPIRIN 81 MG/1
81 TABLET ORAL DAILY
COMMUNITY

## 2017-12-04 RX ORDER — FENTANYL CITRATE 50 UG/ML
INJECTION, SOLUTION INTRAMUSCULAR; INTRAVENOUS AS NEEDED
Status: DISCONTINUED | OUTPATIENT
Start: 2017-12-04 | End: 2017-12-04 | Stop reason: HOSPADM

## 2017-12-04 RX ORDER — SODIUM CHLORIDE 0.9 % (FLUSH) 0.9 %
1-10 SYRINGE (ML) INJECTION AS NEEDED
Status: DISCONTINUED | OUTPATIENT
Start: 2017-12-04 | End: 2017-12-04 | Stop reason: HOSPADM

## 2017-12-04 RX ORDER — MIDAZOLAM HYDROCHLORIDE 1 MG/ML
INJECTION INTRAMUSCULAR; INTRAVENOUS AS NEEDED
Status: DISCONTINUED | OUTPATIENT
Start: 2017-12-04 | End: 2017-12-04 | Stop reason: HOSPADM

## 2017-12-04 RX ADMIN — EPINEPHRINE 0.03 MCG/KG/MIN: 1 INJECTION, SOLUTION INTRAMUSCULAR; SUBCUTANEOUS at 11:37

## 2017-12-04 NOTE — H&P
Cardiology H&P     Donn Robbins  1955  [phone]      12/04/17    DATE OF ADMISSION: 12/4/2017  [facility]    John Taylor MD  [address] [place] [address]    Chief Complaint: Non-sustained ventricular tachycardia    Problem List:  1. Non-sustained ventricular tachycardia:   A. Symptomatic WCT (18 beats), 8/11/17   B. Cardiac catheterization 8/14/17: Near normal coronary arteries, normal LVEF  2. Aortic valve disease:   A. ECHO 8/14/17: Normal LVEF 60%, functional bicuspid valve with moderate AI/AS, mild MR   B. Cardiac MRI 8/25/17: LVEF 55%, likely bicuspid aortic valve with partial fusion of the left and right coronary cusps, mixed valve disease with likely moderate AS and moderate AI  3. Essential Hypertension  4. Bilateral carotic bruits  5. Tobacco abuse  6. Hyperlipidemia      History of Present Illness:   Patient is a 62 year old WM with a history of non-sustained ventricular tachycardia noted in August of this year when he presented to [facility] with c/o palpitations and was noted to have an 18 beat run of a wide complex tachycardia that was felt to be ventricular tachycardia.  He was transferred to [facility] for a left heart catheterization which he underwent on 8/14/17 which showed normal coronary arteries and normal LVEF.  A bicuspid aortic valve was noted per echocardiogram with moderate AI/AS.  He had a cardiac MRI completed at [facility] for further evaluation of his aortic valve disease which also showed likely bicuspid valve with moderate AI/AS.  He presents today for EPS with epinephrine challenge +/- ICD with Dr. Rowe.  Patient is a 2 PPD smoker for the past 40 years and drinks 4-5 beers nightly and drinks 1 cup of coffee daily.  He states he has had 2 more episodes of very brief palpitations both in the early morning hours that resolved on their own, only lasted a few seconds and was asymptomatic with them other than a feeling of fluttering in the  chest.  He denies any CP, SOB, dizziness, lightheadedness, syncope, fevers, chills, or skin infections.  No syncope or near syncope.    No Known Allergies    Prior to Admission Medications     Prescriptions Last Dose Informant Patient Reported? Taking?    aspirin 81 MG EC tablet 12/3/2017  Yes Yes    Take 81 mg by mouth Daily.    lisinopril (PRINIVIL,ZESTRIL) 10 MG tablet 12/3/2017 Self Yes Yes    Take 10 mg by mouth Daily.    oxyCODONE-acetaminophen (PERCOCET) 7.5-325 MG per tablet 12/4/2017 Self Yes Yes    Take 1 tablet by mouth 2 (Two) Times a Day.    atorvastatin (LIPITOR) 20 MG tablet 12/2/2017 Self No No    Take 1 tablet by mouth Every Night for 180 days.    metoprolol tartrate (LOPRESSOR) 25 MG tablet 11/29/2017 Self No No    Take 1 tablet by mouth 2 (Two) Times a Day for 360 days.            Current Facility-Administered Medications:   •  acetaminophen (TYLENOL) tablet 650 mg, 650 mg, Oral, Q4H PRN, PHILIP Diaz  •  ceFAZolin in dextrose (ANCEF) IVPB solution 2 g, 2 g, Intravenous, Once, PHILIP Diaz  •  EPINEPHrine PF (ADRENALIN) 10 mg in sodium chloride 0.9 % 250 mL (0.04 mg/mL) infusion, 0.02-0.3 mcg/kg/min, Intravenous, Once, Oscar Rowe MD  •  ondansetron (ZOFRAN) injection 4 mg, 4 mg, Intravenous, Q6H PRN, PHILIP Diaz  •  sodium chloride 0.9 % flush 1-10 mL, 1-10 mL, Intravenous, PRN, PHILIP Diaz    Social History     Social History   • Marital status:      Spouse name: N/A   • Number of children: N/A   • Years of education: N/A     Social History Main Topics   • Smoking status: Current Some Day Smoker     Packs/day: 2.00     Years: 42.00     Types: Cigarettes   • Smokeless tobacco: Never Used   • Alcohol use 4.2 oz/week     5 Cans of beer, 2 Standard drinks or equivalent per week      Comment: 4-5 BEERS PER DAY   • Drug use: No   • Sexual activity: Defer     Other Topics Concern   • None     Social History Narrative     Family  "History:  Father: , CHF, pacemaker  Mother: Alive, Diabetes, dementia  2 sisters: Alive and healthy    REVIEW OF SYSTEMS:   CONST:  No weight loss, fever, chills, weakness or fatigue.   HEENT:  No visual loss, blurred vision, double vision, yellow sclerae.                   No hearing loss, congestion, sore throat.   SKIN:      No rashes, urticaria, ulcers, sores.     RESP:     + cough, no shortness of breath, hemoptysis  GI:           No anorexia, nausea, vomiting, diarrhea. No abdominal pain, melena.   :         No burning on urination, hematuria or increased frequency.  ENDO:    No diaphoresis, cold or heat intolerance. No polyuria or polydipsia.   NEURO:  No headache, dizziness, syncope, paralysis, ataxia, or parasthesias.                  No change in bowel or bladder control. No history of CVA/TIA  MUSC:    No muscle, back pain, joint pain or stiffness.   HEME:    No anemia, bleeding, bruising. No history of DVT/PE.  PSYCH:  No history of depression, anxiety    Vitals:    17 0700 17 0710 17 0715   BP:  136/73 136/71   BP Location:  Right arm Left arm   Patient Position:  Lying Lying   Pulse:   64   Resp:   18   Temp:   97.5 °F (36.4 °C)   TempSrc:   Temporal Artery    SpO2:   97%   Weight: 192 lb 10.9 oz (87.4 kg)  192 lb 10.9 oz (87.4 kg)   Height:   68\" (172.7 cm)         Vital Sign Min/Max for last 24 hours  Temp  Min: 97.5 °F (36.4 °C)  Max: 97.5 °F (36.4 °C)   BP  Min: 136/71  Max: 136/71   Pulse  Min: 64  Max: 64   Resp  Min: 18  Max: 18   SpO2  Min: 97 %  Max: 97 %   No Data Recorded    No intake or output data in the 24 hours ending 17 0821          Physical Exam:  GEN: Well nourished, Well- developed  No acute distress  HEENT: Normocephalic, Atraumatic, PERRLA, moist mucous membranes  NECK: supple, NO JVD, no thyromegaly, no lymphadenopathy  CARD: S1S2  RRR, gallop, rub, 2/6 systolic ejection murmur  LUNGS: Clear to auscultataion, normal respiratory effort  ABDOMEN: " Obese, soft, nontender, normal bowel sounds  EXTREMITIES:No gross deformities,  No clubbing, cyanosis, or edema  SKIN: Warm, dry  NEURO: No focal deficits  PSYCHIATRIC: Normal affect and mood      Data:     Results from last 7 days  Lab Units 12/03/17  1014   WBC 10*3/mm3 9.78   HEMOGLOBIN g/dL 16.2   HEMATOCRIT % 47.3   PLATELETS 10*3/mm3 97*       Results from last 7 days  Lab Units 12/03/17  1014   SODIUM mmol/L 137   POTASSIUM mmol/L 4.5   CHLORIDE mmol/L 106   CO2 mmol/L 27.0   BUN mg/dL 17   CREATININE mg/dL 0.80   GLUCOSE mg/dL 90                    Results from last 7 days  Lab Units 12/03/17  1014   PROTIME Seconds 11.2   INR  1.03               Telemetry: NSR, HR 64      Assessment and Plan:   1. Non-sustained ventricular tachycardia:  - Patient with 18 beat run of ventricular tachycardia noted in August of this year with normal left heart catheterization and normal LVEF per ECHO.  - Patient will undergo EPS with epinephrine challenge +/- ICD with Dr. Rowe.  The risks, benefits, and alternatives of the procedure have been reviewed and the patient wishes to proceed.   2. Aortic valve disease:  - Moderate AI/AS per echocardiogram and cardiac MRI  - Following with Dr. Nagel  3. Hypertension:  - Well controlled on current medications  4. Tobacco abuse: Immediate cessation      [facility]  12/4/2017  8:21 AM        I, Oscar Rowe MD, personally performed the services face to face as described and documented by the above named individual. I have made any necessary edits and it is both accurate and complete 12/4/2017  10:46 AM

## 2018-03-23 ENCOUNTER — OFFICE VISIT (OUTPATIENT)
Dept: CARDIOLOGY | Facility: CLINIC | Age: 63
End: 2018-03-23

## 2018-03-23 VITALS
HEIGHT: 68 IN | WEIGHT: 195 LBS | BODY MASS INDEX: 29.55 KG/M2 | HEART RATE: 77 BPM | DIASTOLIC BLOOD PRESSURE: 56 MMHG | SYSTOLIC BLOOD PRESSURE: 132 MMHG

## 2018-03-23 DIAGNOSIS — I35.9 AORTIC VALVE DISEASE: ICD-10-CM

## 2018-03-23 DIAGNOSIS — Z72.0 TOBACCO ABUSE: ICD-10-CM

## 2018-03-23 DIAGNOSIS — I47.29 NONSUSTAINED VENTRICULAR TACHYCARDIA (HCC): Primary | ICD-10-CM

## 2018-03-23 PROCEDURE — 93010 ELECTROCARDIOGRAM REPORT: CPT | Performed by: PHYSICIAN ASSISTANT

## 2018-03-23 PROCEDURE — 99213 OFFICE O/P EST LOW 20 MIN: CPT | Performed by: PHYSICIAN ASSISTANT

## 2018-03-23 RX ORDER — ATORVASTATIN CALCIUM 20 MG/1
20 TABLET, FILM COATED ORAL DAILY
COMMUNITY

## 2018-03-23 NOTE — PROGRESS NOTES
"Donn Robbins  1955  [phone]      03/23/2018    [facility] CARDIOLOGY     John Taylor MD  [address] [place] [address]    Chief Complaint   Patient presents with   • Rapid Heart Rate       Problem List:   1. Non-sustained ventricular tachycardia:              A. Symptomatic WCT (18 beats), 8/11/17              B. Cardiac catheterization 8/14/17: Near normal coronary arteries, normal LVEF. Mild aortic gradient to suggest AS.    C. Negative EPS with epinephrine challenge 12/4/17.   2. Aortic valve disease:              A. ECHO 8/14/17: Normal LVEF 60%, functional bicuspid valve with moderate AI/AS, mild MR              B. Cardiac MRI 8/25/17: LVEF 55%, likely bicuspid aortic valve with partial fusion of the left and right coronary cusps, mixed valve disease with likely moderate AS and moderate AI  3. Essential Hypertension  4. Bilateral carotic bruits  5. Tobacco abuse  6. Hyperlipidemia    Allergies  No Known Allergies    Current Medications    Current Outpatient Prescriptions:   •  aspirin 81 MG EC tablet, Take 81 mg by mouth Daily., Disp: , Rfl:   •  atorvastatin (LIPITOR) 20 MG tablet, Take 20 mg by mouth Daily., Disp: , Rfl:   •  lisinopril (PRINIVIL,ZESTRIL) 10 MG tablet, Take 10 mg by mouth Daily., Disp: , Rfl:   •  metoprolol tartrate (LOPRESSOR) 25 MG tablet, Take 1 tablet by mouth 2 (Two) Times a Day for 360 days., Disp: 180 tablet, Rfl: 3  •  oxyCODONE-acetaminophen (PERCOCET) 7.5-325 MG per tablet, Take 1 tablet by mouth 2 (Two) Times a Day., Disp: , Rfl:     History of Present Illness   HPI    Pt presents for follow up of NSVT and VHD. Since we last saw the pt, he states that he has not had any problems. He  denies any palpitation, SOB, CP, LH, and dizziness. Denies any hospitalizations, ER visits, bleeding, or TIA/CVA symptoms. Overall feels well. He is wondering today about his \"leaky valve\". His BP is well controlled on his current medications. " "    ROS:  General:  + fatigue, - weight gain or loss  Cardiovascular:  Denies CP, PND, syncope, near syncope, edema or palpitations.  Pulmonary:  Denies WEEMS, cough, or wheezing      Vitals:    03/23/18 1423   BP: 132/56   BP Location: Left arm   Patient Position: Sitting   Pulse: 77   Weight: 88.5 kg (195 lb)   Height: 172.7 cm (68\")     Body mass index is 29.65 kg/m².  PE:  General: NAD  Neck: no JVD, no carotid bruits, no TM  Heart RRR, NL S1, S2, S4 present, no rubs, + AS murmur  Lungs: CTA, no wheezes, rhonchi, or rales  Abd: soft, non-tender, NL BS  Ext: No musculoskeletal deformities, no edema, cyanosis, or clubbing  Psych: normal mood and affect    Diagnostic Data:        ECG 12 Lead  Date/Time: 3/23/2018 2:56 PM  Performed by: MAXIMINO SANCHEZ  Authorized by: MAXIMINO SANCHEZ   Rhythm: sinus rhythm  BPM: 77              1. Nonsustained ventricular tachycardia    2. Aortic valve disease    3. Tobacco abuse          Plan:  1. NSVT - negative EPS with epinephrine challenge. Negative cardiac MRI, C  2. Moderate AS - monitor for now. Will repeat echocardiogram in August in [place].   3. Tobacco abuse - counseled cessation.     F/up in August with echo same day.     Maximino Sanchez PA-C  [facility]  3/23/2018   3:19 PM  I saw this patient independently.       "

## 2018-09-05 ENCOUNTER — APPOINTMENT (OUTPATIENT)
Dept: CARDIOLOGY | Facility: HOSPITAL | Age: 63
End: 2018-09-05

## 2019-08-01 NOTE — PROGRESS NOTES
"[facility]   OFFICE NOTE      Donn Robbins  1955  PCP: John Taylor MD    SUBJECTIVE:   Donn Robbins is a 64 y.o. male seen for a follow up visit regarding the following:     CC:VT    HPI:   64-year-old gentleman presents today follow-up regarding nonsustained V. tach tachycardia, aortic valve disease, hypertension, and ongoing tobacco abuse.  The patient reports no recurrent episodes of palpitations like it experienced in the past and 2 years ago when he was underwent admission and EP study.  He states occasionally when he lays down at night he feels like his heart \"thumping\" but this is not a rapid beat.  He denies any palpitations associated with dizziness near syncope or syncope.  He denies any chest pain suggesting angina pectoris.  He had does check his blood pressure randomly at home on average he states his blood pressure is \"140\".  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  He denies any heart failure symptoms orthopnea PND or peripheral edema.  He has not had a repeat echo as suggested last year and is eager to pursue the study at this time as he knows he has a history of aortic valve disease.  He follows closely with his primary care provider Dr. Taylor.  He denies any strokelike symptoms.  He has unfortunately been unable to quit smoking.    Cardiac PMH: (Old records have been reviewed and summarized below)  1. Non-sustained ventricular tachycardia:              A. Symptomatic WCT (18 beats), 8/11/17              B. Cardiac catheterization 8/14/17: Near normal coronary arteries, normal LVEF. Mild aortic gradient to suggest AS.               C. Negative EPS with epinephrine challenge 12/4/17.   2. Aortic valve disease:              A. ECHO 8/14/17: Normal LVEF 60%, functional bicuspid valve with moderate AI/AS, mild MR              B. Cardiac MRI 8/25/17: LVEF 55%, likely bicuspid aortic valve with partial fusion of the left and right coronary " cusps, mixed valve disease with likely moderate AS and moderate AI  3. Essential Hypertension  4. Bilateral carotic bruits  5. Tobacco abuse  6. Hyperlipidemia        Past Medical History, Past Surgical History, Family history, Social History, and Medications were all reviewed with the patient today and updated as necessary.       Current Outpatient Medications:   •  aspirin 81 MG EC tablet, Take 81 mg by mouth Daily., Disp: , Rfl:   •  atorvastatin (LIPITOR) 20 MG tablet, Take 20 mg by mouth Daily., Disp: , Rfl:   •  lisinopril (PRINIVIL,ZESTRIL) 10 MG tablet, Take 10 mg by mouth Daily., Disp: , Rfl:   •  metoprolol succinate XL (TOPROL-XL) 50 MG 24 hr tablet, Take 50 mg by mouth Daily. for blood pressure, Disp: , Rfl: 5  •  oxyCODONE-acetaminophen (PERCOCET) 7.5-325 MG per tablet, Take 1 tablet by mouth 2 (Two) Times a Day., Disp: , Rfl:       No Known Allergies  Patient Active Problem List   Diagnosis   • Nonsustained ventricular tachycardia (CMS/HCC)   • Essential hypertension   • Bilateral carotid bruits   • Tobacco abuse   • Aortic valve disease   • Hyperlipidemia LDL goal <100   • Ventricular tachycardia (paroxysmal) (CMS/HCC)     Past Medical History:   Diagnosis Date   • Arthritis    • High cholesterol    • Hypertension    • Kidney stones    • Lumbar herniated disc      Past Surgical History:   Procedure Laterality Date   • CARDIAC CATHETERIZATION N/A 8/14/2017    Procedure: Left Heart Cath;  Surgeon: Emil Nagel MD;  Location:  [facility] CATH INVASIVE LOCATION;  Service:    • CARDIAC ELECTROPHYSIOLOGY PROCEDURE N/A 12/4/2017    Procedure: EPS with epi challange;  Surgeon: Oscar Rowe MD;  Location:  [facility] EP INVASIVE LOCATION;  Service:    • COLONOSCOPY       History reviewed. No pertinent family history.  Social History     Tobacco Use   • Smoking status: Current Some Day Smoker     Packs/day: 2.00     Years: 42.00     Pack years: 84.00     Types: Cigarettes   • Smokeless tobacco: Never  "Used   Substance Use Topics   • Alcohol use: Yes     Alcohol/week: 4.2 oz     Types: 5 Cans of beer, 2 Standard drinks or equivalent per week     Comment: 4-5 BEERS PER DAY       ROS:  Review of Symptoms:  General: no recent weight loss/gain, weakness or fatigue  Skin: no rashes, lumps, or other skin changes  HEENT: no dizziness, lightheadedness, or vision changes  Respiratory: no cough or hemoptysis  Cardiovascular: no palpitations, and tachycardia  Gastrointestinal: no black/tarry stools or diarrhea  Urinary: no change in frequency or urgency  Peripheral Vascular: no claudication or leg cramps  Musculoskeletal: + chronic pain, OA  Psychiatric: no depression or excessive stress  Neurological: no sensory or motor loss, no syncope  Hematologic: no anemia, easy bruising or bleeding  Endocrine: no thyroid problems, nor heat or cold intolerance    PHYSICAL EXAM:    /70 (BP Location: Left arm, Patient Position: Sitting)   Pulse 64   Ht 171.5 cm (67.5\")   Wt 89.3 kg (196 lb 12.8 oz)   SpO2 98%   BMI 30.37 kg/m²        Wt Readings from Last 5 Encounters:   08/02/19 89.3 kg (196 lb 12.8 oz)   03/23/18 88.5 kg (195 lb)   12/04/17 87.4 kg (192 lb 10.9 oz)   08/14/17 85.4 kg (188 lb 3.2 oz)   12/01/14 89.8 kg (197 lb 15.9 oz)       BP Readings from Last 5 Encounters:   08/02/19 150/70   03/23/18 132/56   12/04/17 101/42   08/15/17 142/73   12/01/14 160/88       General appearance - Alert, well appearing, and in no distress   Mental status - Affect appropriate to mood.  Eyes - Sclerae anicteric,  ENMT - Hearing grossly normal bilaterally, Dental hygiene good.  Neck - Carotids upstroke normal bilaterally, + bruits   Resp - Clear to auscultation, no wheezes, rales or rhonchi, symmetric air entry.  Heart - Normal rate, regular rhythm, normal S1, S2, 2/6 LE.  GI - Soft, nontender, nondistended, no masses or organomegaly.  Neurological - Grossly intact - normal speech, no focal findings  Musculoskeletal - No joint " tenderness, deformity or swelling, no muscular tenderness noted.  Extremities - Peripheral pulses normal, no pedal edema, no clubbing or cyanosis.  Skin - Normal coloration and turgor.  Psych -  oriented to person, place, and time.    Medical problems and test results were reviewed with the patient today.     No results found for this or any previous visit (from the past 672 hour(s)).      Echocardiogram 2017    · Left ventricular systolic function is normal. Estimated EF = 60%.  · Left ventricular diastolic dysfunction (grade I) consistent with impaired relaxation.  · The aortic valve is functionally bicuspid. The non-coronary leaflet is immobile. There is moderate aortic stenosis and moderate aortic insufficiency present.  · Mild mitral valve regurgitation is present          ASSESSMENT   1. NSVT:  Negative EPS with Epinephrine Challenge 12/4/17  2. VHD:  Bicuspid Aortic valve with Moderate AS YAHAIRA 1.2cm,Mean gradient 22mmHg by Echo.  Mild by [facility] 2017.   Echocardiogram this year  3. Tobacco Abuse, Discussed cessation.   4. HTN:  Zestril, well controlled. Continue to Monitor, reduce sodium.   5. Normal Coronaries by [facility] 2017  6. HLD: Statin, FLP followed by PCP  7. Negative Carotid scan 2017, No TIA, CVA symptoms       PLAN  · Echocardiogram at the [facility] for evaluation regarding bicuspid aortic valve with moderate aortic stenosis on last visit 2 years ago.  · Continue to focus on risk factor modification we had a long discussion regarding the absolute necessity that he quit smoking.  He is monitoring his blood pressure at home we have asked him to continue to do this if it remains elevated consider adding medical therapy such as hydrochlorthiazide 12.5 mg daily.  · Return to follow-up Dr. Rowe in 6 months or sooner if any change in symptoms.      8/2/2019  12:45 PM    Will Kim WISE

## 2019-08-02 ENCOUNTER — OFFICE VISIT (OUTPATIENT)
Dept: CARDIOLOGY | Facility: CLINIC | Age: 64
End: 2019-08-02

## 2019-08-02 VITALS
SYSTOLIC BLOOD PRESSURE: 150 MMHG | BODY MASS INDEX: 29.83 KG/M2 | HEART RATE: 64 BPM | HEIGHT: 68 IN | DIASTOLIC BLOOD PRESSURE: 70 MMHG | WEIGHT: 196.8 LBS | OXYGEN SATURATION: 98 %

## 2019-08-02 DIAGNOSIS — I10 ESSENTIAL HYPERTENSION: ICD-10-CM

## 2019-08-02 DIAGNOSIS — I47.29 VENTRICULAR TACHYCARDIA (PAROXYSMAL) (HCC): ICD-10-CM

## 2019-08-02 DIAGNOSIS — I47.29 NONSUSTAINED VENTRICULAR TACHYCARDIA (HCC): ICD-10-CM

## 2019-08-02 DIAGNOSIS — I35.9 AORTIC VALVE DISEASE: Primary | ICD-10-CM

## 2019-08-02 PROCEDURE — 99213 OFFICE O/P EST LOW 20 MIN: CPT | Performed by: PHYSICIAN ASSISTANT

## 2019-08-02 RX ORDER — METOPROLOL SUCCINATE 50 MG/1
50 TABLET, EXTENDED RELEASE ORAL DAILY
Refills: 5 | COMMUNITY
Start: 2019-07-03

## 2019-08-21 ENCOUNTER — HOSPITAL ENCOUNTER (OUTPATIENT)
Dept: CARDIOLOGY | Facility: HOSPITAL | Age: 64
Discharge: HOME | End: 2019-08-21
Admitting: PHYSICIAN ASSISTANT

## 2019-08-21 DIAGNOSIS — I10 ESSENTIAL HYPERTENSION: ICD-10-CM

## 2019-08-21 DIAGNOSIS — I47.29 NONSUSTAINED VENTRICULAR TACHYCARDIA (HCC): ICD-10-CM

## 2019-08-21 DIAGNOSIS — I47.29 VENTRICULAR TACHYCARDIA (PAROXYSMAL) (HCC): ICD-10-CM

## 2019-08-21 DIAGNOSIS — I35.9 AORTIC VALVE DISEASE: ICD-10-CM

## 2019-08-21 LAB
BH CV ECHO MEAS - AI DEC SLOPE: 301 CM/SEC^2
BH CV ECHO MEAS - AI MAX PG: 86.9 MMHG
BH CV ECHO MEAS - AI MAX VEL: 466 CM/SEC
BH CV ECHO MEAS - AI P1/2T: 453.4 MSEC
BH CV ECHO MEAS - AO MAX PG (FULL): 57 MMHG
BH CV ECHO MEAS - AO MAX PG: 64.6 MMHG
BH CV ECHO MEAS - AO MEAN PG (FULL): 26 MMHG
BH CV ECHO MEAS - AO MEAN PG: 29 MMHG
BH CV ECHO MEAS - AO ROOT AREA (BSA CORRECTED): 1.4
BH CV ECHO MEAS - AO ROOT AREA: 6.6 CM^2
BH CV ECHO MEAS - AO ROOT DIAM: 2.9 CM
BH CV ECHO MEAS - AO V2 MAX: 402 CM/SEC
BH CV ECHO MEAS - AO V2 MEAN: 246 CM/SEC
BH CV ECHO MEAS - AO V2 VTI: 96.7 CM
BH CV ECHO MEAS - AVA(I,A): 1.2 CM^2
BH CV ECHO MEAS - AVA(I,D): 1.2 CM^2
BH CV ECHO MEAS - AVA(V,A): 1.1 CM^2
BH CV ECHO MEAS - AVA(V,D): 1.1 CM^2
BH CV ECHO MEAS - BSA(HAYCOCK): 2.1 M^2
BH CV ECHO MEAS - BSA: 2 M^2
BH CV ECHO MEAS - BZI_BMI: 30.7 KILOGRAMS/M^2
BH CV ECHO MEAS - BZI_METRIC_HEIGHT: 170.2 CM
BH CV ECHO MEAS - BZI_METRIC_WEIGHT: 88.9 KG
BH CV ECHO MEAS - EDV(CUBED): 154 ML
BH CV ECHO MEAS - EDV(MOD-SP2): 120 ML
BH CV ECHO MEAS - EDV(MOD-SP4): 121 ML
BH CV ECHO MEAS - EDV(TEICH): 138.9 ML
BH CV ECHO MEAS - EF(CUBED): 74.7 %
BH CV ECHO MEAS - EF(MOD-BP): 58 %
BH CV ECHO MEAS - EF(MOD-SP2): 56.7 %
BH CV ECHO MEAS - EF(MOD-SP4): 61.2 %
BH CV ECHO MEAS - EF(TEICH): 66.1 %
BH CV ECHO MEAS - ESV(CUBED): 39 ML
BH CV ECHO MEAS - ESV(MOD-SP2): 52 ML
BH CV ECHO MEAS - ESV(MOD-SP4): 47 ML
BH CV ECHO MEAS - ESV(TEICH): 47.1 ML
BH CV ECHO MEAS - FS: 36.8 %
BH CV ECHO MEAS - IVS/LVPW: 1
BH CV ECHO MEAS - IVSD: 0.92 CM
BH CV ECHO MEAS - LA DIMENSION: 4.3 CM
BH CV ECHO MEAS - LA/AO: 1.5
BH CV ECHO MEAS - LAD MAJOR: 5.3 CM
BH CV ECHO MEAS - LV DIASTOLIC VOL/BSA (35-75): 60.4 ML/M^2
BH CV ECHO MEAS - LV MASS(C)D: 178 GRAMS
BH CV ECHO MEAS - LV MASS(C)DI: 88.8 GRAMS/M^2
BH CV ECHO MEAS - LV MAX PG: 7.6 MMHG
BH CV ECHO MEAS - LV MEAN PG: 3 MMHG
BH CV ECHO MEAS - LV SYSTOLIC VOL/BSA (12-30): 23.4 ML/M^2
BH CV ECHO MEAS - LV V1 MAX: 138 CM/SEC
BH CV ECHO MEAS - LV V1 MEAN: 75.9 CM/SEC
BH CV ECHO MEAS - LV V1 VTI: 36.3 CM
BH CV ECHO MEAS - LVIDD: 5.4 CM
BH CV ECHO MEAS - LVIDS: 3.4 CM
BH CV ECHO MEAS - LVLD AP2: 8.4 CM
BH CV ECHO MEAS - LVLD AP4: 8.7 CM
BH CV ECHO MEAS - LVLS AP2: 6.8 CM
BH CV ECHO MEAS - LVLS AP4: 7.2 CM
BH CV ECHO MEAS - LVOT AREA (M): 3.1 CM^2
BH CV ECHO MEAS - LVOT AREA: 3.1 CM^2
BH CV ECHO MEAS - LVOT DIAM: 2 CM
BH CV ECHO MEAS - LVPWD: 0.89 CM
BH CV ECHO MEAS - MV A MAX VEL: 99.7 CM/SEC
BH CV ECHO MEAS - MV DEC TIME: 0.36 SEC
BH CV ECHO MEAS - MV E MAX VEL: 103 CM/SEC
BH CV ECHO MEAS - MV E/A: 1
BH CV ECHO MEAS - RAP SYSTOLE: 3 MMHG
BH CV ECHO MEAS - RVSP: 16.8 MMHG
BH CV ECHO MEAS - SI(AO): 318.6 ML/M^2
BH CV ECHO MEAS - SI(CUBED): 57.4 ML/M^2
BH CV ECHO MEAS - SI(LVOT): 56.9 ML/M^2
BH CV ECHO MEAS - SI(MOD-SP2): 33.9 ML/M^2
BH CV ECHO MEAS - SI(MOD-SP4): 36.9 ML/M^2
BH CV ECHO MEAS - SI(TEICH): 45.8 ML/M^2
BH CV ECHO MEAS - SV(AO): 638.7 ML
BH CV ECHO MEAS - SV(CUBED): 115 ML
BH CV ECHO MEAS - SV(LVOT): 114 ML
BH CV ECHO MEAS - SV(MOD-SP2): 68 ML
BH CV ECHO MEAS - SV(MOD-SP4): 74 ML
BH CV ECHO MEAS - SV(TEICH): 91.8 ML
BH CV ECHO MEAS - TAPSE (>1.6): 3.2 CM2
BH CV ECHO MEAS - TR MAX PG: 13.8 MMHG
BH CV ECHO MEAS - TR MAX VEL: 186 CM/SEC
BH CV XLRA - RV BASE: 3.5 CM
BH CV XLRA - RV LENGTH: 5.4 CM
BH CV XLRA - RV MID: 2.7 CM
LEFT ATRIUM VOLUME INDEX: 29.9 ML/M^2
LEFT ATRIUM VOLUME: 60 ML
LV EF 2D ECHO EST: 60 %

## 2019-08-21 PROCEDURE — 93306 TTE W/DOPPLER COMPLETE: CPT | Performed by: INTERNAL MEDICINE

## 2019-08-21 PROCEDURE — 93306 TTE W/DOPPLER COMPLETE: CPT

## 2020-03-13 ENCOUNTER — OFFICE VISIT (OUTPATIENT)
Dept: CARDIOLOGY | Facility: CLINIC | Age: 65
End: 2020-03-13

## 2020-03-13 VITALS
HEIGHT: 68 IN | SYSTOLIC BLOOD PRESSURE: 126 MMHG | WEIGHT: 196 LBS | HEART RATE: 70 BPM | DIASTOLIC BLOOD PRESSURE: 60 MMHG | BODY MASS INDEX: 29.7 KG/M2

## 2020-03-13 DIAGNOSIS — I10 ESSENTIAL HYPERTENSION: ICD-10-CM

## 2020-03-13 DIAGNOSIS — I47.29 NONSUSTAINED VENTRICULAR TACHYCARDIA (HCC): Primary | ICD-10-CM

## 2020-03-13 DIAGNOSIS — I35.9 AORTIC VALVE DISEASE: ICD-10-CM

## 2020-03-13 PROCEDURE — 99213 OFFICE O/P EST LOW 20 MIN: CPT | Performed by: INTERNAL MEDICINE

## 2020-03-13 NOTE — PROGRESS NOTES
Donn Robbins  1955  [phone]    03/13/2020    [facility] CARDIOLOGY     John Taylor MD  [address] [place] [address]    Chief Complaint   Patient presents with   • nsvt       Problem List:   1. Non-sustained ventricular tachycardia:              A. Symptomatic WCT (18 beats), 8/11/17              B. Cardiac catheterization 8/14/17: Near normal coronary arteries, normal LVEF. Mild aortic gradient to suggest  AS.               C. Negative EPS with epinephrine challenge 12/4/17.   2. Aortic valve disease:              A. ECHO 8/14/17: Normal LVEF 60%, functional bicuspid valve with moderate AI/AS, mild MR              B. Cardiac MRI 8/25/17: LVEF 55%, likely bicuspid aortic valve with partial fusion of the left and right coronary  cusps, mixed valve disease with likely moderate AS and moderate AI   C.  Echo 8/21/2019:  EF 60%, mod AS/AI  3. Essential Hypertension  4. Bilateral carotic bruits  5. Tobacco abuse  6. Hyperlipidemia    Allergies  No Known Allergies    Current Medications    Current Outpatient Medications:   •  aspirin 81 MG EC tablet, Take 81 mg by mouth Daily., Disp: , Rfl:   •  atorvastatin (LIPITOR) 20 MG tablet, Take 20 mg by mouth Daily., Disp: , Rfl:   •  lisinopril (PRINIVIL,ZESTRIL) 10 MG tablet, Take 10 mg by mouth Daily., Disp: , Rfl:   •  metoprolol succinate XL (TOPROL-XL) 50 MG 24 hr tablet, Take 50 mg by mouth Daily. for blood pressure, Disp: , Rfl: 5  •  oxyCODONE-acetaminophen (PERCOCET) 7.5-325 MG per tablet, Take 1 tablet by mouth 2 (Two) Times a Day., Disp: , Rfl:     History of Present Illness     Pt presents for follow up of NSVT, aortic valve disease, HTN, taobacco abuse. . Since we last saw the pt, pt denies any SOB, CP, LH, and dizziness. Denies any hospitalizations, ER visits, bleeding, or TIA/CVA symptoms.  Does report palpitations 1-2x per week lasting a few seconds. Overall feels well since retiring.  Still smokes 2 packs/day. BP well  "controlled at home on meds    ROS:  General:  Denies  weight gain or loss, +fatigue   Cardiovascular:  Denies CP, PND, syncope, near syncope, edema, + palpitations.  Pulmonary:  Denies WEEMS or wheezing, + cough related to smoking      Vitals:    03/13/20 1455   BP: 126/60   BP Location: Left arm   Patient Position: Sitting   Pulse: 70   Weight: 88.9 kg (196 lb)   Height: 171.5 cm (67.5\")     Body mass index is 30.24 kg/m².  PE:  General: NAD  Neck: no JVD, no carotid bruits, no TM  Heart RRR, NL S1, S2, S4 present, no rubs, murmurs  Lungs: CTA, no wheezes, rhonchi, or rales  Abd: soft, non-tender, NL BS  Ext: No musculoskeletal deformities, no edema, cyanosis, or clubbing  Psych: normal mood and affect    Diagnostic Data:      Procedures    1. Nonsustained ventricular tachycardia (CMS/HCC)    2. Aortic valve disease    3. Essential hypertension          Plan:  1) NSVT - continue Toprol  Continue present medications.   2 Aortic valve disease  Moderate at echo in August, continue to monitor  3) HTN  Wt loss, exercise, salt reduction, smoking cessation  4) VHD: AS/AI; recent echo above    F/up in 12 months    IDarcy RN, scribed this note for Oscar Rowe MD 3/13/2020 15:18.    Oscar MERINO MD, personally performed the services described in this documentation as scribed by the above named individual in my presence, and it is both accurate and complete.  3/13/2020  15:18      "

## 2021-01-22 ENCOUNTER — TRANSCRIBE ORDERS (OUTPATIENT)
Dept: ADMINISTRATIVE | Facility: HOSPITAL | Age: 66
End: 2021-01-22

## 2021-01-22 DIAGNOSIS — I35.0 NONRHEUMATIC AORTIC VALVE STENOSIS: Primary | ICD-10-CM

## 2021-01-29 ENCOUNTER — APPOINTMENT (OUTPATIENT)
Dept: CARDIOLOGY | Facility: HOSPITAL | Age: 66
End: 2021-01-29

## 2021-02-22 DIAGNOSIS — Z23 IMMUNIZATION DUE: ICD-10-CM

## 2021-06-04 ENCOUNTER — OFFICE VISIT (OUTPATIENT)
Dept: CARDIOLOGY | Facility: CLINIC | Age: 66
End: 2021-06-04

## 2021-06-04 VITALS
SYSTOLIC BLOOD PRESSURE: 144 MMHG | OXYGEN SATURATION: 96 % | HEIGHT: 68 IN | BODY MASS INDEX: 30.31 KG/M2 | DIASTOLIC BLOOD PRESSURE: 64 MMHG | HEART RATE: 64 BPM | WEIGHT: 200 LBS

## 2021-06-04 DIAGNOSIS — Z72.0 TOBACCO ABUSE: ICD-10-CM

## 2021-06-04 DIAGNOSIS — I10 ESSENTIAL HYPERTENSION: ICD-10-CM

## 2021-06-04 DIAGNOSIS — I47.29 NONSUSTAINED VENTRICULAR TACHYCARDIA (HCC): Primary | ICD-10-CM

## 2021-06-04 DIAGNOSIS — I35.9 AORTIC VALVE DISEASE: ICD-10-CM

## 2021-06-04 PROCEDURE — 99214 OFFICE O/P EST MOD 30 MIN: CPT | Performed by: INTERNAL MEDICINE

## 2021-06-04 NOTE — PROGRESS NOTES
Cardiac Electrophysiology Outpatient Follow Up Note            [facility] at [facility]    Follow Up Office Visit      Donn Robbins  [id number]  06/04/2021  [unfilled]  [unfilled]    Primary Care Physician: John Taylor MD (Inactive)    Referred By: No ref. provider found    Subjective     Chief Complaint:   Diagnoses and all orders for this visit:    1. Nonsustained ventricular tachycardia (CMS/HCC) (Primary)    2. Essential hypertension    3. Tobacco abuse    4. Aortic valve disease      Chief Complaint   Patient presents with   • Ventricular tachycardia     Problem List:   1. Non-sustained ventricular tachycardia:              A. Symptomatic WCT (18 beats), 8/11/17              B. Cardiac catheterization 8/14/17: Near normal coronary arteries, normal LVEF. Mild aortic gradient to suggest          AS.               C. Negative EPS with epinephrine challenge 12/4/17.   2. Aortic valve disease:              A. ECHO 8/14/17: Normal LVEF 60%, functional bicuspid valve with moderate AI/AS, mild MR              B. Cardiac MRI 8/25/17: LVEF 55%, likely bicuspid aortic valve with partial fusion of the left and right coronary  cusps, mixed valve disease with likely moderate AS and moderate AI              C.  Echo 8/21/2019:  EF 60%, mod AS/AI  3. Essential Hypertension  4. Bilateral carotic bruits  5. Tobacco abuse  6. Hyperlipidemia    History of Present Illness:   Donn Robbins is a 66 y.o. male who presents to my electrophysiology clinic for follow up of above complaints.  Mr. Robbins is here mostly for follow-up for nonsustained VT but also for evaluation of what is previously been characterized as moderate aortic stenosis likely with a bicuspid valve.    Sam is doing rather well.  Has had no change in his exertional capacity he is tolerating exercise ambulation climbing stairs walking around doing activities of daily living without any significant problem.  No waking up at  night short of breath no chest pain syncope or heart failure symptoms.      Review of Systems:   Constitutional: No fevers or chills, no recent weight gain or weight loss or fatigue  Eyes: No visual loss, blurred vision, double vision, yellow sclerae.  ENT: No headaches, hearing loss, vertigo, congestion or sore throat.   Cardiovascular: Per HPI  Respiratory: No cough or wheezing, no sputum production, no hematemesis   Gastrointestinal: No abdominal pain, no nausea, vomiting, constipation, diarrhea, melena.   Genitourinary: No dysuria, hematuria or increased frequency.  Musculoskeletal:  No gait disturbance, weakness or joint pain or stiffness  Integumentary: No rashes, urticaria, ulcers or sores.   Neurological: No headache, dizziness, syncope, paralysis, ataxia, no prior CVA/TIA  Psychiatric: No anxiety, or depression  Endocrine: No diaphoresis, cold or heat intolerance. No polyuria or polydipsia.   Hematologic/Lymphatic: No anemia, abnormal bruising or bleeding. No history of DVT/PE.      Past Medical History:   Past Medical History:   Diagnosis Date   • Arthritis    • High cholesterol    • Hypertension    • Kidney stones    • Lumbar herniated disc        Past Surgical History:   Past Surgical History:   Procedure Laterality Date   • CARDIAC CATHETERIZATION N/A 8/14/2017    Procedure: Left Heart Cath;  Surgeon: Emil Nagel MD;  Location:  [facility] CATH INVASIVE LOCATION;  Service:    • CARDIAC ELECTROPHYSIOLOGY PROCEDURE N/A 12/4/2017    Procedure: EPS with epi challange;  Surgeon: Oscar Rowe MD;  Location:  [facility] EP INVASIVE LOCATION;  Service:    • COLONOSCOPY         Family History: History reviewed. No pertinent family history.    Social History:   Social History     Socioeconomic History   • Marital status: Single     Spouse name: Not on file   • Number of children: Not on file   • Years of education: Not on file   • Highest education level: Not on file   Tobacco Use   • Smoking status:  "Current Some Day Smoker     Packs/day: 2.00     Years: 42.00     Pack years: 84.00     Types: Cigarettes   • Smokeless tobacco: Never Used   Substance and Sexual Activity   • Alcohol use: Yes     Alcohol/week: 7.0 standard drinks     Types: 5 Cans of beer, 2 Standard drinks or equivalent per week     Comment: 4-5 BEERS PER DAY   • Drug use: No   • Sexual activity: Defer       Medications:     Current Outpatient Medications:   •  aspirin 81 MG EC tablet, Take 81 mg by mouth Daily., Disp: , Rfl:   •  atorvastatin (LIPITOR) 20 MG tablet, Take 20 mg by mouth Daily., Disp: , Rfl:   •  lisinopril (PRINIVIL,ZESTRIL) 10 MG tablet, Take 20 mg by mouth Daily., Disp: , Rfl:   •  metoprolol succinate XL (TOPROL-XL) 50 MG 24 hr tablet, Take 50 mg by mouth Daily. for blood pressure, Disp: , Rfl: 5  •  oxyCODONE-acetaminophen (PERCOCET) 7.5-325 MG per tablet, Take 1 tablet by mouth 2 (Two) Times a Day., Disp: , Rfl:     Allergies:   No Known Allergies    Objective   Vital Signs:   Vitals:    06/04/21 1429   BP: 144/64   BP Location: Right arm   Patient Position: Sitting   Pulse: 64   SpO2: 96%   Weight: 90.7 kg (200 lb)   Height: 172.7 cm (68\")       PHYSICAL EXAM  General appearance: Awake, alert, cooperative  Head: Normocephalic, without obvious abnormality, atraumatic  Eyes: Conjunctivae/corneas clear, EOMs intact  Neck: no adenopathy, no carotid bruit, no JVD and thyroid: not enlarged  Lungs: clear to auscultation bilaterally and no rhonchi or crackles\", ' symmetric  Heart: Regular rate and rhythm 3 out of 6 systolic ejection murmur preserved A2  Abdomen: Soft, non-tender, bowel sounds normal,  no organomegaly  Extremities: extremities normal, atraumatic, no cyanosis or edema  Skin: Skin color, turgor normal, no rashes or lesions  Neurologic: Grossly normal     Lab Results   Component Value Date    GLUCOSE 90 12/03/2017    CALCIUM 9.6 12/03/2017     12/03/2017    K 4.5 12/03/2017    CO2 27.0 12/03/2017     " 12/03/2017    BUN 17 12/03/2017    CREATININE 0.80 12/03/2017    EGFRIFNONA 99 12/03/2017    BCR 21.3 12/03/2017    ANIONGAP 4.0 12/03/2017     Lab Results   Component Value Date    WBC 9.78 12/03/2017    HGB 16.2 12/03/2017    HCT 47.3 12/03/2017    MCV 91.8 12/03/2017    PLT 97 (L) 12/03/2017     Lab Results   Component Value Date    INR 1.03 12/03/2017    INR 0.98 08/13/2017    PROTIME 11.2 12/03/2017    PROTIME 10.7 08/13/2017     Lab Results   Component Value Date    TSH 3.547 08/13/2017       Cardiac Testing:     I personally viewed and interpreted the patient's EKG/Telemetry/lab data    Procedures    Tobacco Cessation: N/A  Obstructive Sleep Apnea Screening: Completed    Assessment & Plan    Diagnoses and all orders for this visit:    1. Nonsustained ventricular tachycardia (CMS/HCC) (Primary)    2. Essential hypertension    3. Tobacco abuse    4. Aortic valve disease         Diagnosis Plan   1. Nonsustained ventricular tachycardia (CMS/HCC)   totally asymptomatic.  Tolerating beta-blocker well.  Structurally normal heart with the exception of likely moderate aortic stenosis and potentially bicuspid aortic valve including evaluation of a cardiac MRI several years ago.  No further work-up or testing is required here.   2. Essential hypertension   pressure well controlled.  Doing well.   3. Tobacco abuse   needs to stop smoking understands this.  Counseling provided.   4. Aortic valve disease   moderate aortic stenosis likely.  On physical exam it does not seem to be more than this.  Its been 2 years since his last echo.  We will reevaluate him with a repeat echocardiogram.    See him back in a year otherwise.     Body mass index is 30.41 kg/m².    I spent 28 minutes in consultation with this patient which included more than 65% of this time in direct face-to-face counseling, physical examination and discussion of my assessment and findings and shared decision making with the patient.  The remainder of the time  not spent face to face was performing one, some or all of the following actions:  preparing to see this patient ( eg. Review of tests),  ordering medications, tests or procedures ), care coordination, discussion of the plan with other healthcare providers, documenting clinical information in Epic well as independently interpreting results and communicating results to patient, family and or caregiver.  All time noted occurred on the date of service.    Follow Up:       Thank you for allowing me to participate in the care of your patient. Please to not hesitate to contact me with additional questions or concerns.      Harry Brothers DO, FACC, RS  Cardiac Electrophysiologist  [facility] / [facility]

## 2021-06-10 ENCOUNTER — HOSPITAL ENCOUNTER (OUTPATIENT)
Dept: CARDIOLOGY | Facility: HOSPITAL | Age: 66
Discharge: HOME | End: 2021-06-10
Admitting: INTERNAL MEDICINE

## 2021-06-10 DIAGNOSIS — I35.9 AORTIC VALVE DISEASE: ICD-10-CM

## 2021-06-10 DIAGNOSIS — I10 ESSENTIAL HYPERTENSION: ICD-10-CM

## 2021-06-10 DIAGNOSIS — I47.29 NONSUSTAINED VENTRICULAR TACHYCARDIA (HCC): ICD-10-CM

## 2021-06-10 LAB
BH CV ECHO MEAS - % IVS THICK: -3.7 %
BH CV ECHO MEAS - % LVPW THICK: 8.9 %
BH CV ECHO MEAS - ACS: 1.1 CM
BH CV ECHO MEAS - AI DEC SLOPE: 352 CM/SEC^2
BH CV ECHO MEAS - AI MAX PG: 85 MMHG
BH CV ECHO MEAS - AI MAX VEL: 461 CM/SEC
BH CV ECHO MEAS - AI P1/2T: 383.6 MSEC
BH CV ECHO MEAS - AO MAX PG (FULL): 60.2 MMHG
BH CV ECHO MEAS - AO MAX PG: 67.9 MMHG
BH CV ECHO MEAS - AO MEAN PG (FULL): 28 MMHG
BH CV ECHO MEAS - AO MEAN PG: 31 MMHG
BH CV ECHO MEAS - AO ROOT AREA (BSA CORRECTED): 1.3
BH CV ECHO MEAS - AO ROOT AREA: 5.3 CM^2
BH CV ECHO MEAS - AO ROOT DIAM: 2.6 CM
BH CV ECHO MEAS - AO V2 MAX: 412 CM/SEC
BH CV ECHO MEAS - AO V2 MEAN: 255 CM/SEC
BH CV ECHO MEAS - AO V2 VTI: 100.4 CM
BH CV ECHO MEAS - AVA(I,A): 1.4 CM^2
BH CV ECHO MEAS - AVA(I,D): 1.4 CM^2
BH CV ECHO MEAS - AVA(V,A): 1.3 CM^2
BH CV ECHO MEAS - AVA(V,D): 1.3 CM^2
BH CV ECHO MEAS - BSA(HAYCOCK): 2.1 M^2
BH CV ECHO MEAS - BSA: 2 M^2
BH CV ECHO MEAS - BZI_BMI: 30.4 KILOGRAMS/M^2
BH CV ECHO MEAS - BZI_METRIC_HEIGHT: 172.7 CM
BH CV ECHO MEAS - BZI_METRIC_WEIGHT: 90.7 KG
BH CV ECHO MEAS - EDV(CUBED): 120.9 ML
BH CV ECHO MEAS - EDV(MOD-SP4): 108 ML
BH CV ECHO MEAS - EDV(TEICH): 115.2 ML
BH CV ECHO MEAS - EF(CUBED): 73.3 %
BH CV ECHO MEAS - EF(MOD-SP4): 72.1 %
BH CV ECHO MEAS - EF(TEICH): 64.9 %
BH CV ECHO MEAS - ESV(CUBED): 32.3 ML
BH CV ECHO MEAS - ESV(MOD-SP4): 30.1 ML
BH CV ECHO MEAS - ESV(TEICH): 40.5 ML
BH CV ECHO MEAS - FS: 35.6 %
BH CV ECHO MEAS - IVS/LVPW: 1.1
BH CV ECHO MEAS - IVSD: 1.2 CM
BH CV ECHO MEAS - IVSS: 1.2 CM
BH CV ECHO MEAS - LA DIMENSION: 4.4 CM
BH CV ECHO MEAS - LA/AO: 1.7
BH CV ECHO MEAS - LV DIASTOLIC VOL/BSA (35-75): 52.8 ML/M^2
BH CV ECHO MEAS - LV MASS(C)D: 221 GRAMS
BH CV ECHO MEAS - LV MASS(C)DI: 108.1 GRAMS/M^2
BH CV ECHO MEAS - LV MASS(C)S: 117.9 GRAMS
BH CV ECHO MEAS - LV MASS(C)SI: 57.7 GRAMS/M^2
BH CV ECHO MEAS - LV MAX PG: 7.7 MMHG
BH CV ECHO MEAS - LV MEAN PG: 3 MMHG
BH CV ECHO MEAS - LV SYSTOLIC VOL/BSA (12-30): 14.7 ML/M^2
BH CV ECHO MEAS - LV V1 MAX: 139 CM/SEC
BH CV ECHO MEAS - LV V1 MEAN: 79.3 CM/SEC
BH CV ECHO MEAS - LV V1 VTI: 36.2 CM
BH CV ECHO MEAS - LVIDD: 4.9 CM
BH CV ECHO MEAS - LVIDS: 3.2 CM
BH CV ECHO MEAS - LVLD AP4: 7.3 CM
BH CV ECHO MEAS - LVLS AP4: 5.7 CM
BH CV ECHO MEAS - LVOT AREA (M): 3.8 CM^2
BH CV ECHO MEAS - LVOT AREA: 3.8 CM^2
BH CV ECHO MEAS - LVOT DIAM: 2.2 CM
BH CV ECHO MEAS - LVPWD: 1.1 CM
BH CV ECHO MEAS - LVPWS: 1.2 CM
BH CV ECHO MEAS - MV A MAX VEL: 133 CM/SEC
BH CV ECHO MEAS - MV DEC SLOPE: 286 CM/SEC^2
BH CV ECHO MEAS - MV E MAX VEL: 108 CM/SEC
BH CV ECHO MEAS - MV E/A: 0.81
BH CV ECHO MEAS - MV MAX PG: 8.3 MMHG
BH CV ECHO MEAS - MV MEAN PG: 2 MMHG
BH CV ECHO MEAS - MV P1/2T MAX VEL: 151 CM/SEC
BH CV ECHO MEAS - MV P1/2T: 154.6 MSEC
BH CV ECHO MEAS - MV V2 MAX: 144 CM/SEC
BH CV ECHO MEAS - MV V2 MEAN: 64.5 CM/SEC
BH CV ECHO MEAS - MV V2 VTI: 57.4 CM
BH CV ECHO MEAS - MVA P1/2T LCG: 1.5 CM^2
BH CV ECHO MEAS - MVA(P1/2T): 1.4 CM^2
BH CV ECHO MEAS - MVA(VTI): 2.4 CM^2
BH CV ECHO MEAS - PA ACC TIME: 0.13 SEC
BH CV ECHO MEAS - PA PR(ACCEL): 18.7 MMHG
BH CV ECHO MEAS - SI(AO): 260.7 ML/M^2
BH CV ECHO MEAS - SI(CUBED): 43.4 ML/M^2
BH CV ECHO MEAS - SI(LVOT): 67.3 ML/M^2
BH CV ECHO MEAS - SI(MOD-SP4): 38.1 ML/M^2
BH CV ECHO MEAS - SI(TEICH): 36.6 ML/M^2
BH CV ECHO MEAS - SV(AO): 532.8 ML
BH CV ECHO MEAS - SV(CUBED): 88.6 ML
BH CV ECHO MEAS - SV(LVOT): 137.6 ML
BH CV ECHO MEAS - SV(MOD-SP4): 77.9 ML
BH CV ECHO MEAS - SV(TEICH): 74.7 ML
MAXIMAL PREDICTED HEART RATE: 156 BPM
STRESS TARGET HR: 133 BPM

## 2021-06-10 PROCEDURE — 93306 TTE W/DOPPLER COMPLETE: CPT

## 2021-06-10 PROCEDURE — 93306 TTE W/DOPPLER COMPLETE: CPT | Performed by: INTERNAL MEDICINE

## 2022-06-03 ENCOUNTER — OFFICE VISIT (OUTPATIENT)
Dept: CARDIOLOGY | Facility: CLINIC | Age: 67
End: 2022-06-03

## 2022-06-03 VITALS
HEART RATE: 60 BPM | OXYGEN SATURATION: 96 % | SYSTOLIC BLOOD PRESSURE: 135 MMHG | WEIGHT: 180 LBS | HEIGHT: 68 IN | BODY MASS INDEX: 27.28 KG/M2 | DIASTOLIC BLOOD PRESSURE: 60 MMHG

## 2022-06-03 DIAGNOSIS — Z72.0 TOBACCO ABUSE: ICD-10-CM

## 2022-06-03 DIAGNOSIS — I10 ESSENTIAL HYPERTENSION: ICD-10-CM

## 2022-06-03 DIAGNOSIS — I47.29 VENTRICULAR TACHYCARDIA (PAROXYSMAL): ICD-10-CM

## 2022-06-03 DIAGNOSIS — I47.29 NONSUSTAINED VENTRICULAR TACHYCARDIA: Primary | ICD-10-CM

## 2022-06-03 DIAGNOSIS — I35.9 AORTIC VALVE DISEASE: ICD-10-CM

## 2022-06-03 PROCEDURE — 99214 OFFICE O/P EST MOD 30 MIN: CPT | Performed by: INTERNAL MEDICINE

## 2022-06-03 RX ORDER — LISINOPRIL 40 MG/1
40 TABLET ORAL DAILY
COMMUNITY
Start: 2022-04-29 | End: 2023-03-31 | Stop reason: ALTCHOICE

## 2022-06-03 RX ORDER — HYDROCHLOROTHIAZIDE 25 MG/1
25 TABLET ORAL DAILY
COMMUNITY
Start: 2022-04-29 | End: 2023-03-31 | Stop reason: SINTOL

## 2022-06-03 NOTE — PROGRESS NOTES
Cardiac Electrophysiology Outpatient Follow Up Note            [facility] at [facility]    Follow Up Office Visit      Donn Robbins  [id number]  06/03/2022  [unfilled]  [unfilled]    Primary Care Physician: Alonso Lakhani PA    Referred By: No ref. provider found    Subjective     Chief Complaint:   Diagnoses and all orders for this visit:    1. Nonsustained ventricular tachycardia (HCC) (Primary)    2. Essential hypertension    3. Aortic valve disease    4. Ventricular tachycardia (paroxysmal) (HCC)    5. Tobacco abuse      Chief Complaint   Patient presents with   • nonsustained ventricular tachycardia        History of Present Illness:   Donn Robbins is a 67 y.o. male who presents to my electrophysiology clinic for follow up of the above complaints.  He had some twitching in his right eye the other day.  He has had this on and off over the years.  Apart from this no real issues or concerns.    Lots of fatigue.  Falls asleep during the day.  Will fall asleep at a stoplight if he is not careful.      Review of Systems:   Constitutional: No fevers or chills, no recent weight gain or weight loss or fatigue  Eyes: No visual loss, blurred vision, double vision, yellow sclerae.  ENT: No headaches, hearing loss, vertigo, congestion or sore throat.   Cardiovascular: Per HPI  Respiratory: No cough or wheezing, no sputum production, no hematemesis     Past Medical History:   Past Medical History:   Diagnosis Date   • Arthritis    • High cholesterol    • Hypertension    • Kidney stones    • Lumbar herniated disc        Past Surgical History:   Past Surgical History:   Procedure Laterality Date   • CARDIAC CATHETERIZATION N/A 8/14/2017    Procedure: Left Heart Cath;  Surgeon: Emil Nagel MD;  Location: [facility] CATH INVASIVE LOCATION;  Service:    • CARDIAC ELECTROPHYSIOLOGY PROCEDURE N/A 12/4/2017    Procedure: EPS with epi wil;  Surgeon: Oscar NELSON  "MD [place];  Location: [facility] INVASIVE LOCATION;  Service:    • COLONOSCOPY         Family History: History reviewed. No pertinent family history.    Social History:   Social History     Socioeconomic History   • Marital status: Single   Tobacco Use   • Smoking status: Current Some Day Smoker     Packs/day: 2.00     Years: 42.00     Pack years: 84.00     Types: Cigarettes   • Smokeless tobacco: Never Used   Substance and Sexual Activity   • Alcohol use: Yes     Alcohol/week: 7.0 standard drinks     Types: 5 Cans of beer, 2 Standard drinks or equivalent per week     Comment: 4-5 BEERS PER DAY   • Drug use: No   • Sexual activity: Defer       Medications:     Current Outpatient Medications:   •  aspirin 81 MG EC tablet, Take 81 mg by mouth Daily., Disp: , Rfl:   •  atorvastatin (LIPITOR) 20 MG tablet, Take 20 mg by mouth Daily., Disp: , Rfl:   •  lisinopril (PRINIVIL,ZESTRIL) 40 MG tablet, Take 40 mg by mouth Daily. for blood pressure, Disp: , Rfl:   •  metoprolol succinate XL (TOPROL-XL) 50 MG 24 hr tablet, Take 50 mg by mouth Daily. for blood pressure, Disp: , Rfl: 5  •  oxyCODONE-acetaminophen (PERCOCET) 7.5-325 MG per tablet, Take 1 tablet by mouth 2 (Two) Times a Day., Disp: , Rfl:   •  hydroCHLOROthiazide (HYDRODIURIL) 25 MG tablet, Take 25 mg by mouth Daily., Disp: , Rfl:   •  lisinopril (PRINIVIL,ZESTRIL) 10 MG tablet, Take 20 mg by mouth Daily., Disp: , Rfl:     Allergies:   No Known Allergies    Objective   Vital Signs:   Vitals:    06/03/22 1554   BP: 135/60   BP Location: Left arm   Patient Position: Sitting   Pulse: 60   SpO2: 96%   Weight: 81.6 kg (180 lb)   Height: 172.7 cm (68\")       PHYSICAL EXAM  General appearance: Awake, alert, cooperative  Head: Normocephalic, without obvious abnormality, atraumatic  Eyes: Conjunctivae/corneas clear, EOMs intact  Neck: no adenopathy, no carotid bruit, no JVD and thyroid: not enlarged  Lungs: clear to auscultation bilaterally and no rhonchi or crackles\", ' " symmetric  Heart: Regular rate, 3 out of 6 systolic ejection murmur with slightly diminished A2.  Additionally also a 2 out of 4 diastolic murmur.    Abdomen: Soft, non-tender, bowel sounds normal,  no organomegaly  Extremities: extremities normal, atraumatic, no cyanosis or edema  Skin: Skin color, turgor normal, no rashes or lesions  Neurologic: Grossly normal     Lab Results   Component Value Date    GLUCOSE 90 12/03/2017    CALCIUM 9.6 12/03/2017     12/03/2017    K 4.5 12/03/2017    CO2 27.0 12/03/2017     12/03/2017    BUN 17 12/03/2017    CREATININE 0.80 12/03/2017    EGFRIFNONA 99 12/03/2017    BCR 21.3 12/03/2017    ANIONGAP 4.0 12/03/2017     Lab Results   Component Value Date    WBC 9.78 12/03/2017    HGB 16.2 12/03/2017    HCT 47.3 12/03/2017    MCV 91.8 12/03/2017    PLT 97 (L) 12/03/2017     Lab Results   Component Value Date    INR 1.03 12/03/2017    INR 0.98 08/13/2017    PROTIME 11.2 12/03/2017    PROTIME 10.7 08/13/2017     Lab Results   Component Value Date    TSH 3.547 08/13/2017       Cardiac Testing:     I personally viewed and interpreted the patient's EKG/Telemetry/lab data    Procedures    Tobacco Cessation: Cessation Counseling Provided   Obstructive Sleep Apnea Screening: Completed    Assessment & Plan    Diagnoses and all orders for this visit:    1. Nonsustained ventricular tachycardia (HCC) (Primary)    2. Essential hypertension    3. Aortic valve disease    4. Ventricular tachycardia (paroxysmal) (HCC)    5. Tobacco abuse         Diagnosis Plan   1. Nonsustained ventricular tachycardia (HCC)   strongly suspect this is due to undiagnosed occult sleep apnea.    Lots of fatigue.    Falls asleep during the day.    [place] virtually of different nonsustained arrhythmias on ambulatory heart rhythm monitor.    Sleep Apnea Screening Questionnaire - ‘STOP BANG’       Question Yes No  1.   Do you Snore Loudly (loud enough to be heard through closed doors or your bed-partner elbows  you for snoring at night)?  x    2.  Do you often fell Tired, Fatigued, or Sleepy during the daytime (such as        falling asleep during driving or talking to someone)?  x    3.  Has anyone Observed you Stop Breathing or Choking/Gasping during your        sleep?   x   4.  Do you have or are being treated for High Blood Pressure?  x    5.  Body Mass Index more than 35 kg/m2       Height:                    Weight:       06/03/22  1554   Weight: 81.6 kg (180 lb)          BMI: Body mass index is 27.37 kg/m².  x   6.  Age older than 50? x    7.  Neck size larger? (Measured around Lopez apple)       For Male: Is your shirt collar 17 inches / 43 cm or larger?        For Female: Is your shirt collar 17 inches / 43 cm or larger?   x   8.  Gender =    [x] Male   [] Female                  For general population   NÉSTOR - Low Risk : Yes to 0 - 2 questions  NÉSTOR - Intermediate Risk : Yes to 3 - 4 questions  NÉSTOR - High Risk : Yes to 5 - 8 questions  or Yes to 2 or more of 4 STOP questions + male gender  or Yes to 2 or more of 4 STOP questions + BMI > 35kg/m2  or Yes to 2 or more of 4 STOP questions + neck circumference 17 inches / 43cm in male or 16 inches / 41cm in female     Modified from  Kadeem NELSON et al. Anesthesiology 2008; 108: 812-821,   Kadeem NELSON et al Br J Anaesth 2012; 108: 768-775,   Kadeem NELSON et al J Clin Sleep Med Sept 2014.     Arrange for sleep study.   2. Essential hypertension   blood pressure reasonably well controlled.   3. Aortic valve disease   history of both aortic insufficiency as well as mild aortic stenosis.    Physical exam consistent with above.    We will obtain an echocardiogram.    We will have arranged for him to see one of our general cardiology colleagues with expertise in valvular heart disease.    We will call him on Monday to arrange the above.   4. Ventricular tachycardia (paroxysmal) (HCC)   nonsustained VT in the context of essentially a structurally normal heart.  Consider occult sleep apnea.   As above.  Sleep study is indicated.   5. Tobacco abuse  Ongoing Tobacco Abuse Counseling:    This patient currently uses tobacco products.  We discussed that tobacco abuse is an addiction and is strongly linked with poor health outcomes such as heart disease, stroke, vascular disease, cancer and premature death.  I strongly advised the patient that immediate cessation of tobacco is critical to preserving, maintaining and improving their health.  We discussed various methods of tobacco abuse cessation, including counseling and various pharmacologic and non-pharmacologic therapies. We spent over 10 minutes discussing all options pertinent to tobacco cessation.  The patient voiced a clear understanding of these risks and these medical facts regarding tobacco abuse.       Body mass index is 27.37 kg/m².    I spent 38 minutes in consultation with this patient which included more than 65% of this time in direct face-to-face counseling, physical examination and discussion of my assessment and findings and shared decision making with the patient.  The remainder of the time not spent face to face was performing one, some or all of the following actions:  preparing to see this patient ( eg. Review of tests),  ordering medications, tests or procedures ), care coordination, discussion of the plan with other healthcare providers, documenting clinical information in Epic well as independently interpreting results and communicating results to patient, family and or caregiver.  All time noted occurred on the date of service.    Follow Up:       Thank you for allowing me to participate in the care of your patient. Please to not hesitate to contact me with additional questions or concerns.      Harry Brothers DO, FACC, RS  Cardiac Electrophysiologist  [facility] / [facility]

## 2022-06-06 DIAGNOSIS — I35.9 AORTIC VALVE DISEASE: Primary | ICD-10-CM

## 2022-06-06 DIAGNOSIS — I47.29 VENTRICULAR TACHYCARDIA (PAROXYSMAL): ICD-10-CM

## 2022-06-06 DIAGNOSIS — G47.33 OSA (OBSTRUCTIVE SLEEP APNEA): ICD-10-CM

## 2022-06-08 ENCOUNTER — HOSPITAL ENCOUNTER (OUTPATIENT)
Dept: CARDIOLOGY | Facility: HOSPITAL | Age: 67
Discharge: HOME | End: 2022-06-08
Admitting: INTERNAL MEDICINE

## 2022-06-08 DIAGNOSIS — I47.29 VENTRICULAR TACHYCARDIA (PAROXYSMAL): ICD-10-CM

## 2022-06-08 DIAGNOSIS — I35.9 AORTIC VALVE DISEASE: ICD-10-CM

## 2022-06-08 LAB
BH CV ECHO MEAS - AI P1/2T: 464.8 MSEC
BH CV ECHO MEAS - AO MAX PG: 62.1 MMHG
BH CV ECHO MEAS - AO MEAN PG: 34 MMHG
BH CV ECHO MEAS - AO ROOT DIAM: 2.9 CM
BH CV ECHO MEAS - AO V2 MAX: 394 CM/SEC
BH CV ECHO MEAS - AO V2 VTI: 98.2 CM
BH CV ECHO MEAS - AVA(I,D): 0.78 CM2
BH CV ECHO MEAS - EDV(CUBED): 110.6 ML
BH CV ECHO MEAS - EDV(MOD-SP4): 83.3 ML
BH CV ECHO MEAS - EF(MOD-SP4): 71.5 %
BH CV ECHO MEAS - ESV(CUBED): 50.7 ML
BH CV ECHO MEAS - ESV(MOD-SP4): 23.7 ML
BH CV ECHO MEAS - FS: 22.9 %
BH CV ECHO MEAS - IVS/LVPW: 0.93 CM
BH CV ECHO MEAS - IVSD: 1.4 CM
BH CV ECHO MEAS - LA DIMENSION: 4.2 CM
BH CV ECHO MEAS - LAT PEAK E' VEL: 7.8 CM/SEC
BH CV ECHO MEAS - LV DIASTOLIC VOL/BSA (35-75): 42.6 CM2
BH CV ECHO MEAS - LV MASS(C)D: 288.4 GRAMS
BH CV ECHO MEAS - LV MAX PG: 5.2 MMHG
BH CV ECHO MEAS - LV MEAN PG: 3 MMHG
BH CV ECHO MEAS - LV SYSTOLIC VOL/BSA (12-30): 12.1 CM2
BH CV ECHO MEAS - LV V1 MAX: 114 CM/SEC
BH CV ECHO MEAS - LV V1 VTI: 30.1 CM
BH CV ECHO MEAS - LVIDD: 4.8 CM
BH CV ECHO MEAS - LVIDS: 3.7 CM
BH CV ECHO MEAS - LVOT AREA: 2.5 CM2
BH CV ECHO MEAS - LVOT DIAM: 1.8 CM
BH CV ECHO MEAS - LVPWD: 1.5 CM
BH CV ECHO MEAS - MED PEAK E' VEL: 5.1 CM/SEC
BH CV ECHO MEAS - MV A MAX VEL: 137 CM/SEC
BH CV ECHO MEAS - MV E MAX VEL: 116 CM/SEC
BH CV ECHO MEAS - MV E/A: 0.85
BH CV ECHO MEAS - PA ACC TIME: 0.15 SEC
BH CV ECHO MEAS - PA PR(ACCEL): 11 MMHG
BH CV ECHO MEAS - SI(MOD-SP4): 30.5 ML/M2
BH CV ECHO MEAS - SV(LVOT): 76.6 ML
BH CV ECHO MEAS - SV(MOD-SP4): 59.6 ML
BH CV ECHO MEAS - TAPSE (>1.6): 2.8 CM
BH CV ECHO MEASUREMENTS AVERAGE E/E' RATIO: 17.98
LEFT ATRIUM VOLUME INDEX: 36.4 ML/M2
MAXIMAL PREDICTED HEART RATE: 155 BPM
STRESS TARGET HR: 132 BPM

## 2022-06-08 PROCEDURE — 93306 TTE W/DOPPLER COMPLETE: CPT

## 2022-06-08 PROCEDURE — 93306 TTE W/DOPPLER COMPLETE: CPT | Performed by: SPECIALIST

## 2022-06-17 ENCOUNTER — OFFICE VISIT (OUTPATIENT)
Dept: CARDIOLOGY | Facility: CLINIC | Age: 67
End: 2022-06-17

## 2022-06-17 VITALS
HEIGHT: 69 IN | SYSTOLIC BLOOD PRESSURE: 172 MMHG | BODY MASS INDEX: 29.51 KG/M2 | WEIGHT: 199.2 LBS | DIASTOLIC BLOOD PRESSURE: 73 MMHG | HEART RATE: 63 BPM | OXYGEN SATURATION: 98 %

## 2022-06-17 DIAGNOSIS — E78.5 HYPERLIPIDEMIA LDL GOAL <100: ICD-10-CM

## 2022-06-17 DIAGNOSIS — I10 ESSENTIAL HYPERTENSION: ICD-10-CM

## 2022-06-17 DIAGNOSIS — I47.29 NONSUSTAINED VENTRICULAR TACHYCARDIA: ICD-10-CM

## 2022-06-17 DIAGNOSIS — I35.9 AORTIC VALVE DISEASE: Primary | ICD-10-CM

## 2022-06-17 PROCEDURE — 99214 OFFICE O/P EST MOD 30 MIN: CPT | Performed by: INTERNAL MEDICINE

## 2022-06-17 NOTE — PROGRESS NOTES
[facility] CARDIOLOGY  [address] [facility] [place] [address]  Phone: [phone]  Fax: [phone]    06/17/2022    Chief Complaint   Patient presents with   • Aortic Stenosis     Patient states has aortic stenosis, hypertension         History:     Donn Robbins is a 67 y.o. male presenting for  initial evaluation patient has follow-up with Dr. ALMANZAR cardiac electrophysiologist for history of nonsustained ventricular tachycardia.  He also had moderate aortic valve stenosis and he was here to establish cardiology care for evaluation of the same.  He denies any worsening shortness of breath, chest pain, dizziness, syncope or CHF symptoms.  Had a recent echo 2 weeks ago where his gradients were consistent in the moderate range and also valve area by planimetry was 1.2 cm squire consistent with a moderate aortic stenosis range.    Past Medical History:   Diagnosis Date   • Arthritis    • High cholesterol    • Hypertension    • Kidney stones    • Lumbar herniated disc        Past Surgical History:   Procedure Laterality Date   • CARDIAC CATHETERIZATION N/A 8/14/2017    Procedure: Left Heart Cath;  Surgeon: Emil Nagel MD;  Location:  [facility] CATH INVASIVE LOCATION;  Service:    • CARDIAC ELECTROPHYSIOLOGY PROCEDURE N/A 12/4/2017    Procedure: EPS with epi challange;  Surgeon: Oscar Rowe MD;  Location:  [facility] EP INVASIVE LOCATION;  Service:    • COLONOSCOPY          Past Social History:  Social History     Socioeconomic History   • Marital status: Single   Tobacco Use   • Smoking status: Current Some Day Smoker     Packs/day: 2.00     Years: 42.00     Pack years: 84.00     Types: Cigarettes   • Smokeless tobacco: Never Used   Substance and Sexual Activity   • Alcohol use: Yes     Alcohol/week: 7.0 standard drinks     Types: 5 Cans of beer, 2 Standard drinks or equivalent per week     Comment: 4-5 BEERS PER DAY   • Drug use: No   • Sexual activity: Defer       Past  "Family History:  History reviewed. No pertinent family history.    Review of Systems:   ROS       Current Outpatient Medications   Medication Sig Dispense Refill   • aspirin 81 MG EC tablet Take 81 mg by mouth Daily.     • atorvastatin (LIPITOR) 20 MG tablet Take 20 mg by mouth Daily.     • hydroCHLOROthiazide (HYDRODIURIL) 25 MG tablet Take 25 mg by mouth Daily.     • lisinopril (PRINIVIL,ZESTRIL) 40 MG tablet Take 40 mg by mouth Daily. for blood pressure     • metoprolol succinate XL (TOPROL-XL) 50 MG 24 hr tablet Take 50 mg by mouth Daily. for blood pressure  5   • oxyCODONE-acetaminophen (PERCOCET) 7.5-325 MG per tablet Take 1 tablet by mouth 2 (Two) Times a Day.       No current facility-administered medications for this visit.        No Known Allergies    Objective     /73 (BP Location: Left arm, Patient Position: Sitting, Cuff Size: Adult)   Pulse 63   Ht 175.3 cm (69\")   Wt 90.4 kg (199 lb 3.2 oz)   SpO2 98%   BMI 29.42 kg/m²     Physical Exam  Constitutional:       Appearance: He is well-developed.   HENT:      Head: Normocephalic and atraumatic.   Eyes:      Pupils: Pupils are equal, round, and reactive to light.   Cardiovascular:      Rate and Rhythm: Normal rate and regular rhythm.      Heart sounds: Murmur heard.   Pulmonary:      Effort: Pulmonary effort is normal.      Breath sounds: Normal breath sounds.   Abdominal:      General: Bowel sounds are normal.      Palpations: Abdomen is soft.   Musculoskeletal:         General: Normal range of motion.      Cervical back: Normal range of motion and neck supple.   Skin:     General: Skin is warm and dry.      Capillary Refill: Capillary refill takes less than 2 seconds.   Neurological:      Mental Status: He is alert and oriented to person, place, and time.            DATA:  Results for orders placed during the hospital encounter of 12/04/17    SCANNED - TELEMETRY     Results for orders placed during the hospital encounter of 06/08/22    Adult " Transthoracic Echo Complete W/ Cont if Necessary Per Protocol    Interpretation Summary  · Left ventricular wall thickness is consistent with mild concentric hypertrophy.  · Left ventricular ejection fraction appears to be 66 - 70%. Left ventricular systolic function is normal.  · Left ventricular diastolic function is consistent with (grade Ia w/high LAP) impaired relaxation.  · Moderate to severe aortic valve stenosis is present, with an estimated aortic valve area of 0.8 cm², however by planimetry is 1.2 cm² which is more accurate, and with mean gradient of 34 mmHg.     Results for orders placed during the hospital encounter of 08/13/17    Cardiac Catheterization/Vascular Study    Impression  · Normal coronary arteries  · Normal LV systolic function  · Normal hemodynamics  · Mild aortic valve gradient to suggest aortic stenosis    RECOMMENDATIONS:  · Obtain tracings of wide complex tachycardia for EP to review    ----------------------------------------------------------------------------------------------------------------------    Clinical History: 62-year-old gentleman with a history of palpitations associated with dizziness.  At the [facility], he was diagnosed with nonsustained VT and transferred to [facility] for further care    Access: 6 Greek left radial    Procedure narrative:  The left wrist was prepped and draped in sterile fashion.  A Terumo glidesheath was placed.  Radial cocktail was administered.  Selective right and left coronary angiography performed with diagnostic catheters.  Left heart catheterization and left ventriculography were performed using a pigtail catheter.  At the conclusion procedure, the sheath was removed and hemostasis achieved with a TR band.    Angiographic Findings:  · Coronary circulation is left dominant  · Left main: Normal  · LAD: Normal  · LCX: Normal  · RCA: Small and normal    Left ventricle: LVEF 55%    Mitral regurgitation: 1+    Hemodynamic Findings:  Ao  pressure: 114/70 mmHg  LVEDP: 7 mmHg    Gradient of 10 mmHg to suggest mild aortic stenosis    Complications: None apparent    Procedures     Lab Results   Component Value Date    CHOL 155 08/13/2017     Lab Results   Component Value Date    TRIG 237 (H) 08/13/2017     Lab Results   Component Value Date    HDL 43 08/13/2017     Lab Results   Component Value Date     08/13/2017       Lab Results   Component Value Date    TSH 3.547 08/13/2017               Invalid input(s): LABALBU, PROT        Assessment and Plan     Diagnosis Plan   1. Aortic valve disease     2. Essential hypertension     3. Hyperlipidemia LDL goal <100     4. Nonsustained ventricular tachycardia (HCC)       Will consider doing a transthoracic echo in the next 3 months and plan for DARREN for better evaluation and assessment of his aortic valve stenosis.  For now we will continue with current active medical management for valvular heart disease.  He is currently on lisinopril 40 mg daily, his blood pressure was elevated in office but he was adamant that his home blood pressure has been consistently in the 110s to 120s systolic and he was not interested in uptitrating his medication or adding new medications.      Recommended increase activity to 30 minutes of walking daily, most days of the week    Thank you for allowing me to participate in the care of Donn Robbins. Feel free to contact me directly with any further questions or concerns.          Leroy Jimenez MD, FACC  Interventional Cardiology

## 2022-09-16 ENCOUNTER — APPOINTMENT (OUTPATIENT)
Dept: GENERAL RADIOLOGY | Facility: HOSPITAL | Age: 67
End: 2022-09-16

## 2022-09-16 ENCOUNTER — HOSPITAL ENCOUNTER (EMERGENCY)
Facility: HOSPITAL | Age: 67
Discharge: HOME | End: 2022-09-16
Attending: STUDENT IN AN ORGANIZED HEALTH CARE EDUCATION/TRAINING PROGRAM | Admitting: STUDENT IN AN ORGANIZED HEALTH CARE EDUCATION/TRAINING PROGRAM

## 2022-09-16 ENCOUNTER — APPOINTMENT (OUTPATIENT)
Dept: CT IMAGING | Facility: HOSPITAL | Age: 67
End: 2022-09-16

## 2022-09-16 VITALS
SYSTOLIC BLOOD PRESSURE: 148 MMHG | BODY MASS INDEX: 28.04 KG/M2 | RESPIRATION RATE: 18 BRPM | HEART RATE: 81 BPM | DIASTOLIC BLOOD PRESSURE: 81 MMHG | WEIGHT: 185 LBS | OXYGEN SATURATION: 98 % | HEIGHT: 68 IN | TEMPERATURE: 97.9 F

## 2022-09-16 DIAGNOSIS — I10 HYPERTENSION, UNSPECIFIED TYPE: Primary | ICD-10-CM

## 2022-09-16 LAB
ALBUMIN SERPL-MCNC: 4.56 G/DL (ref 3.5–5.2)
ALBUMIN/GLOB SERPL: 1.5 G/DL
ALP SERPL-CCNC: 108 U/L (ref 39–117)
ALT SERPL W P-5'-P-CCNC: 16 U/L (ref 1–41)
ANION GAP SERPL CALCULATED.3IONS-SCNC: 12.8 MMOL/L (ref 5–15)
AST SERPL-CCNC: 24 U/L (ref 1–40)
BILIRUB SERPL-MCNC: 0.5 MG/DL (ref 0–1.2)
BUN SERPL-MCNC: 17 MG/DL (ref 8–23)
BUN/CREAT SERPL: 18.7 (ref 7–25)
CALCIUM SPEC-SCNC: 9.5 MG/DL (ref 8.6–10.5)
CHLORIDE SERPL-SCNC: 104 MMOL/L (ref 98–107)
CO2 SERPL-SCNC: 24.2 MMOL/L (ref 22–29)
CREAT SERPL-MCNC: 0.91 MG/DL (ref 0.76–1.27)
DEPRECATED RDW RBC AUTO: 47.2 FL (ref 37–54)
EGFRCR SERPLBLD CKD-EPI 2021: 93.5 ML/MIN/1.73
EOSINOPHIL # BLD MANUAL: 0.34 10*3/MM3 (ref 0–0.4)
EOSINOPHIL NFR BLD MANUAL: 3 % (ref 0.3–6.2)
ERYTHROCYTE [DISTWIDTH] IN BLOOD BY AUTOMATED COUNT: 14 % (ref 12.3–15.4)
GLOBULIN UR ELPH-MCNC: 3.1 GM/DL
GLUCOSE SERPL-MCNC: 104 MG/DL (ref 65–99)
HCT VFR BLD AUTO: 46.8 % (ref 37.5–51)
HGB BLD-MCNC: 15.7 G/DL (ref 13–17.7)
HOLD SPECIMEN: NORMAL
HOLD SPECIMEN: NORMAL
LYMPHOCYTES # BLD MANUAL: 1.58 10*3/MM3 (ref 0.7–3.1)
LYMPHOCYTES NFR BLD MANUAL: 8 % (ref 5–12)
MCH RBC QN AUTO: 31.2 PG (ref 26.6–33)
MCHC RBC AUTO-ENTMCNC: 33.5 G/DL (ref 31.5–35.7)
MCV RBC AUTO: 92.9 FL (ref 79–97)
MONOCYTES # BLD: 0.9 10*3/MM3 (ref 0.1–0.9)
NEUTROPHILS # BLD AUTO: 8.48 10*3/MM3 (ref 1.7–7)
NEUTROPHILS NFR BLD MANUAL: 75 % (ref 42.7–76)
PLATELET # BLD AUTO: 76 10*3/MM3 (ref 140–450)
PMV BLD AUTO: ABNORMAL FL
POTASSIUM SERPL-SCNC: 4.2 MMOL/L (ref 3.5–5.2)
PROT SERPL-MCNC: 7.7 G/DL (ref 6–8.5)
RBC # BLD AUTO: 5.04 10*6/MM3 (ref 4.14–5.8)
RBC MORPH BLD: NORMAL
SCAN SLIDE: NORMAL
SMALL PLATELETS BLD QL SMEAR: ABNORMAL
SODIUM SERPL-SCNC: 141 MMOL/L (ref 136–145)
TROPONIN T SERPL-MCNC: <0.01 NG/ML (ref 0–0.03)
TROPONIN T SERPL-MCNC: <0.01 NG/ML (ref 0–0.03)
VARIANT LYMPHS NFR BLD MANUAL: 14 % (ref 19.6–45.3)
WBC NRBC COR # BLD: 11.3 10*3/MM3 (ref 3.4–10.8)
WHOLE BLOOD HOLD COAG: NORMAL
WHOLE BLOOD HOLD SPECIMEN: NORMAL

## 2022-09-16 PROCEDURE — 85007 BL SMEAR W/DIFF WBC COUNT: CPT | Performed by: STUDENT IN AN ORGANIZED HEALTH CARE EDUCATION/TRAINING PROGRAM

## 2022-09-16 PROCEDURE — 93005 ELECTROCARDIOGRAM TRACING: CPT | Performed by: STUDENT IN AN ORGANIZED HEALTH CARE EDUCATION/TRAINING PROGRAM

## 2022-09-16 PROCEDURE — 93010 ELECTROCARDIOGRAM REPORT: CPT | Performed by: SPECIALIST

## 2022-09-16 PROCEDURE — 36415 COLL VENOUS BLD VENIPUNCTURE: CPT

## 2022-09-16 PROCEDURE — 71045 X-RAY EXAM CHEST 1 VIEW: CPT

## 2022-09-16 PROCEDURE — 70450 CT HEAD/BRAIN W/O DYE: CPT

## 2022-09-16 PROCEDURE — 80053 COMPREHEN METABOLIC PANEL: CPT | Performed by: STUDENT IN AN ORGANIZED HEALTH CARE EDUCATION/TRAINING PROGRAM

## 2022-09-16 PROCEDURE — 85025 COMPLETE CBC W/AUTO DIFF WBC: CPT | Performed by: STUDENT IN AN ORGANIZED HEALTH CARE EDUCATION/TRAINING PROGRAM

## 2022-09-16 PROCEDURE — 99283 EMERGENCY DEPT VISIT LOW MDM: CPT

## 2022-09-16 PROCEDURE — 84484 ASSAY OF TROPONIN QUANT: CPT | Performed by: STUDENT IN AN ORGANIZED HEALTH CARE EDUCATION/TRAINING PROGRAM

## 2022-09-16 RX ORDER — SODIUM CHLORIDE 0.9 % (FLUSH) 0.9 %
10 SYRINGE (ML) INJECTION AS NEEDED
Status: DISCONTINUED | OUTPATIENT
Start: 2022-09-16 | End: 2022-09-17 | Stop reason: HOSPADM

## 2022-09-17 LAB
QT INTERVAL: 432 MS
QTC INTERVAL: 432 MS

## 2022-09-17 NOTE — ED PROVIDER NOTES
Subjective   History of Present Illness  67-year-old male presents to the ER with a primary complaint of elevated blood pressure and increasing dizziness.  Patient also noted mild headache.  No focal neurological deficits or vision changes.  Denied chest pain or shortness of breath.  Denied obvious aggravating or alleviating factors.  Denied dysarthria or dysphagia.  Slightly elevated blood pressure on arrival.  Afebrile        Review of Systems   Neurological: Positive for dizziness and headaches.   All other systems reviewed and are negative.      Past Medical History:   Diagnosis Date   • Arthritis    • High cholesterol    • Hypertension    • Kidney stones    • Lumbar herniated disc        No Known Allergies    Past Surgical History:   Procedure Laterality Date   • CARDIAC CATHETERIZATION N/A 8/14/2017    Procedure: Left Heart Cath;  Surgeon: mEil Nagel MD;  Location:  [facility] CATH INVASIVE LOCATION;  Service:    • CARDIAC ELECTROPHYSIOLOGY PROCEDURE N/A 12/4/2017    Procedure: EPS with epi challange;  Surgeon: Oscar Rowe MD;  Location:  [facility] EP INVASIVE LOCATION;  Service:    • COLONOSCOPY         No family history on file.    Social History     Socioeconomic History   • Marital status: Single   Tobacco Use   • Smoking status: Current Some Day Smoker     Packs/day: 2.00     Years: 42.00     Pack years: 84.00     Types: Cigarettes   • Smokeless tobacco: Never Used   Substance and Sexual Activity   • Alcohol use: Yes     Alcohol/week: 7.0 standard drinks     Types: 5 Cans of beer, 2 Standard drinks or equivalent per week     Comment: 4-5 BEERS PER DAY   • Drug use: No   • Sexual activity: Defer           Objective   Physical Exam  Constitutional:       General: He is not in acute distress.     Appearance: He is well-developed. He is not ill-appearing.   HENT:      Head: Normocephalic and atraumatic.   Eyes:      Extraocular Movements: Extraocular movements intact.      Pupils: Pupils are  equal, round, and reactive to light.   Neck:      Vascular: No JVD.   Cardiovascular:      Rate and Rhythm: Normal rate and regular rhythm.      Heart sounds: Normal heart sounds. No murmur heard.  Pulmonary:      Effort: No tachypnea, accessory muscle usage or respiratory distress.      Breath sounds: Normal breath sounds. No stridor. No decreased breath sounds, wheezing, rhonchi or rales.   Chest:      Chest wall: No deformity, tenderness or crepitus.   Abdominal:      General: Bowel sounds are normal.      Palpations: Abdomen is soft.      Tenderness: There is no abdominal tenderness. There is no guarding or rebound.   Musculoskeletal:         General: Normal range of motion.      Cervical back: Normal range of motion and neck supple.      Right lower leg: No tenderness. No edema.      Left lower leg: No tenderness. No edema.   Lymphadenopathy:      Cervical: No cervical adenopathy.   Skin:     General: Skin is warm and dry.      Coloration: Skin is not cyanotic.      Findings: No ecchymosis or erythema.   Neurological:      General: No focal deficit present.      Mental Status: He is alert and oriented to person, place, and time.      Cranial Nerves: No cranial nerve deficit.      Motor: No weakness.   Psychiatric:         Mood and Affect: Mood normal. Mood is not anxious.         Behavior: Behavior normal. Behavior is not agitated.         Procedures           ED Course  ED Course as of 09/16/22 2329   Fri Sep 16, 2022   2111 EKG noted sinus rhythm.  60 bpm.  I directly evaluated the EKG of this patient and noted no acute abnormalities. [SF]   2326 CBC and CMP unremarkable.  Troponin trend x2 within normal limits.  Head CT without contrast noted no acute abnormality.  Chest x-ray unremarkable.  EKG noted no acute abnormality.  Blood pressure improved with 1 p.o. clonidine.  Work up and results were discussed throughly with the patient.  The patient will be discharged for further monitoring and management with  their PCP.  Red flags, warning signs, worsening symptoms, and when to return to the ER discussed with and understood by the patient.  Patient will follow up with their PCP in a timely manner.  Vitals stable at discharge. [SF]      ED Course User Index  [SF] Blu Quiñones DO                                           MDM    Final diagnoses:   Hypertension, unspecified type       ED Disposition  ED Disposition     ED Disposition   Discharge    Condition   Stable    Comment   --             No follow-up provider specified.       Medication List      No changes were made to your prescriptions during this visit.          Blu Quiñones DO  09/16/22 [id number]

## 2023-03-06 ENCOUNTER — TRANSCRIBE ORDERS (OUTPATIENT)
Dept: ADMINISTRATIVE | Facility: HOSPITAL | Age: 68
End: 2023-03-06
Payer: MEDICARE

## 2023-03-06 DIAGNOSIS — I35.0 SEVERE AORTIC VALVE STENOSIS: Primary | ICD-10-CM

## 2023-03-08 ENCOUNTER — HOSPITAL ENCOUNTER (OUTPATIENT)
Dept: CARDIOLOGY | Facility: HOSPITAL | Age: 68
Discharge: HOME | End: 2023-03-08
Admitting: PHYSICIAN ASSISTANT
Payer: MEDICARE

## 2023-03-08 DIAGNOSIS — I35.0 SEVERE AORTIC VALVE STENOSIS: ICD-10-CM

## 2023-03-08 PROCEDURE — 93306 TTE W/DOPPLER COMPLETE: CPT | Performed by: INTERNAL MEDICINE

## 2023-03-08 PROCEDURE — 93306 TTE W/DOPPLER COMPLETE: CPT

## 2023-03-09 LAB
BH CV ECHO MEAS - AI P1/2T: 513.1 MSEC
BH CV ECHO MEAS - AO MAX PG: 68.5 MMHG
BH CV ECHO MEAS - AO MEAN PG: 36.4 MMHG
BH CV ECHO MEAS - AO ROOT DIAM: 3.4 CM
BH CV ECHO MEAS - AO V2 MAX: 413.6 CM/SEC
BH CV ECHO MEAS - AO V2 VTI: 93.5 CM
BH CV ECHO MEAS - AVA(I,D): 1.33 CM2
BH CV ECHO MEAS - EDV(CUBED): 132.7 ML
BH CV ECHO MEAS - EDV(MOD-SP4): 105 ML
BH CV ECHO MEAS - EF(MOD-SP4): 71 %
BH CV ECHO MEAS - ESV(CUBED): 35.9 ML
BH CV ECHO MEAS - ESV(MOD-SP4): 30.5 ML
BH CV ECHO MEAS - FS: 35.3 %
BH CV ECHO MEAS - IVS/LVPW: 0.77 CM
BH CV ECHO MEAS - IVSD: 1 CM
BH CV ECHO MEAS - LA DIMENSION: 3.6 CM
BH CV ECHO MEAS - LAT PEAK E' VEL: 8.1 CM/SEC
BH CV ECHO MEAS - LV DIASTOLIC VOL/BSA (35-75): 53.1 CM2
BH CV ECHO MEAS - LV MASS(C)D: 227.4 GRAMS
BH CV ECHO MEAS - LV MAX PG: 8.3 MMHG
BH CV ECHO MEAS - LV MEAN PG: 4 MMHG
BH CV ECHO MEAS - LV SYSTOLIC VOL/BSA (12-30): 15.4 CM2
BH CV ECHO MEAS - LV V1 MAX: 144 CM/SEC
BH CV ECHO MEAS - LV V1 VTI: 32.6 CM
BH CV ECHO MEAS - LVIDD: 5.1 CM
BH CV ECHO MEAS - LVIDS: 3.3 CM
BH CV ECHO MEAS - LVOT AREA: 3.8 CM2
BH CV ECHO MEAS - LVOT DIAM: 2.2 CM
BH CV ECHO MEAS - LVPWD: 1.3 CM
BH CV ECHO MEAS - MED PEAK E' VEL: 5.9 CM/SEC
BH CV ECHO MEAS - MV A MAX VEL: 114 CM/SEC
BH CV ECHO MEAS - MV E MAX VEL: 107 CM/SEC
BH CV ECHO MEAS - MV E/A: 0.94
BH CV ECHO MEAS - PA ACC TIME: 0.12 SEC
BH CV ECHO MEAS - PA PR(ACCEL): 26.8 MMHG
BH CV ECHO MEAS - SI(MOD-SP4): 37.7 ML/M2
BH CV ECHO MEAS - SV(LVOT): 123.9 ML
BH CV ECHO MEAS - SV(MOD-SP4): 74.5 ML
BH CV ECHO MEAS - TAPSE (>1.6): 3.1 CM
BH CV ECHO MEASUREMENTS AVERAGE E/E' RATIO: 15.29
LEFT ATRIUM VOLUME INDEX: 24 ML/M2
MAXIMAL PREDICTED HEART RATE: 155 BPM
STRESS TARGET HR: 132 BPM

## 2023-03-28 ENCOUNTER — TELEPHONE (OUTPATIENT)
Dept: CARDIOLOGY | Facility: CLINIC | Age: 68
End: 2023-03-28
Payer: MEDICARE

## 2023-03-28 NOTE — TELEPHONE ENCOUNTER
Called patient to confirm appointment. Patient denies any cardiac testing done outside of [facility]. Echo under cardiovascular tab

## 2023-03-30 NOTE — PROGRESS NOTES
[facility] Cardiology    Encounter Date: 2023    Patient ID: Donn Robbins is a 68 y.o. male.  : 1955     PCP: Alonso Lakhani PA       Chief Complaint: Aortic valve disease      PROBLEM LIST:  1. Aortic stenosis  a. Echo 2017: EF 60%, grade 1 diastolic dysfunction, aortic valve is functionally bicuspid as the noncoronary leaflet is immobile.  There is moderate aortic stenosis and moderate AI.  Mild MR  b. Echo 2019: EF 60%.  Aortic valve is calcified there is moderate aortic stenosis and moderate AI.  c. Echo 2022: EF 66-70%, moderate to severe aortic stenosis (YAHAIRA 1.2 cm², mean gradient 34, max gradient 62, peak velocity 394 cm/s)  d. Echo 3/8/2023: EF 61-65%, grade 1 diastolic dysfunction, severe aortic stenosis present (mean gradient 36.4 mmHg, max 68.5, YAHAIRA 1.33, peak velocity 413 cm/s)  2. Nonsustained ventricular tachycardia  a. C 2017: Normal coronary arteries with normal LV systolic function  3. Hypertension  4. Dyslipidemia  5. Tobacco use      History of Present Illness: Donn Robbins is a 68 y.o. male who presents to the cardiology office today to be seen in consultation for aortic stenosis.  He is primarily followed by interventional cardiology and [place] and is followed by Dr. Brothers for NSVT. Patient reports that he had been having difficulty maintaining appropriate blood pressure. His PCP started him on amlodipine and obtained a repeat echocardiogram which revealed progression in his aortic stenosis. Today, the patient reports that he has been stable from a cardiac standpoint. 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 He does have some dyspnea on exertion but believes that this is his long standing tobacco use. He does have occasional dizziness but this does not occur daily and he denies any barbie syncope.  He denies any chest pain, orthopnea, edema, presyncope or syncope.    Past Medical History:   Past  Medical History:   Diagnosis Date   • Arthritis    • High cholesterol    • Hypertension    • Kidney stones    • Lumbar herniated disc        Past Surgical History:   Past Surgical History:   Procedure Laterality Date   • CARDIAC CATHETERIZATION N/A 8/14/2017    Procedure: Left Heart Cath;  Surgeon: Emil Nagel MD;  Location:  [facility] CATH INVASIVE LOCATION;  Service:    • CARDIAC ELECTROPHYSIOLOGY PROCEDURE N/A 12/4/2017    Procedure: EPS with epi challange;  Surgeon: Oscar Rowe MD;  Location:  [facility] EP INVASIVE LOCATION;  Service:    • COLONOSCOPY         Family History:   Family History   Problem Relation Age of Onset   • Diabetes type II Mother    • Heart failure Father    • No Known Problems Sister        Social History:   Social History     Socioeconomic History   • Marital status: Single   Tobacco Use   • Smoking status: Every Day     Packs/day: 1.00     Years: 42.00     Pack years: 42.00     Types: Cigarettes   • Smokeless tobacco: Never   Substance and Sexual Activity   • Alcohol use: Yes     Alcohol/week: 5.0 standard drinks     Types: 5 Cans of beer per week     Comment: 4-5 BEERS PER DAY   • Drug use: No   • Sexual activity: Defer       Tobacco History:   Social History     Tobacco Use   Smoking Status Every Day   • Packs/day: 1.00   • Years: 42.00   • Pack years: 42.00   • Types: Cigarettes   Smokeless Tobacco Never       Medications:     Current Outpatient Medications:   •  amLODIPine (NORVASC) 5 MG tablet, Take 1 tablet by mouth Daily. for blood pressure, Disp: , Rfl:   •  aspirin 81 MG EC tablet, Take 1 tablet by mouth Daily., Disp: , Rfl:   •  atorvastatin (LIPITOR) 20 MG tablet, Take 1 tablet by mouth Daily., Disp: , Rfl:   •  cloNIDine (CATAPRES) 0.1 MG tablet, Take 1 tablet by mouth Every 6 (Six) Hours As Needed., Disp: , Rfl:   •  lisinopril (PRINIVIL,ZESTRIL) 30 MG tablet, 1 tablet 2 (Two) Times a Day., Disp: , Rfl:   •  metoprolol succinate XL (TOPROL-XL) 50 MG 24 hr tablet,  "Take 1 tablet by mouth Daily. for blood pressure, Disp: , Rfl: 5  •  pramipexole (MIRAPEX) 0.5 MG tablet, Take 1 tablet by mouth Daily., Disp: , Rfl:     Allergies:   No Known Allergies  The following portions of the patient's history were reviewed and updated as appropriate: allergies, current medications, past family history, past medical history, past social history, past surgical history and problem list.    Review of Systems   12 point ROS negative except for that listed in the HPI        Objective:     /68 (BP Location: Right arm, Patient Position: Sitting)   Pulse 86   Ht 172.7 cm (68\")   Wt 90.7 kg (200 lb)   SpO2 97%   BMI 30.41 kg/m²      Physical Exam  Constitutional: Patient appears well-developed and well-nourished.   HENT: HEENT exam unremarkable.   Neck: Neck supple. No JVD present. No carotid bruits.   Cardiovascular: Normal rate, regular rhythm and normal heart sounds. 2/6 systolic murmur.   2+ symmetric pulses.   Pulmonary/Chest: Breath sounds normal. Does not exhibit tenderness.   Abdominal: Abdomen benign.   Musculoskeletal: Does not exhibit edema.   Neurological: Neurological exam unremarkable.   Vitals reviewed.    Data Review:   Lab Results   Component Value Date    GLUCOSE 104 (H) 09/16/2022    BUN 17 09/16/2022    CREATININE 0.91 09/16/2022    EGFRIFNONA 99 12/03/2017    BCR 18.7 09/16/2022    K 4.2 09/16/2022    CO2 24.2 09/16/2022    CALCIUM 9.5 09/16/2022    ALBUMIN 4.56 09/16/2022    AST 24 09/16/2022    ALT 16 09/16/2022     Lab Results   Component Value Date    CHOL 155 08/13/2017    TRIG 237 (H) 08/13/2017    HDL 43 08/13/2017     08/13/2017      Lab Results   Component Value Date    WBC 11.30 (H) 09/16/2022    RBC 5.04 09/16/2022    HGB 15.7 09/16/2022    HCT 46.8 09/16/2022    MCV 92.9 09/16/2022    PLT 76 (L) 09/16/2022     Lab Results   Component Value Date    TSH 3.547 08/13/2017     No results found for: HGBA1C       ECG 12 Lead    Date/Time: 3/31/2023 10:51 " AM  Performed by: Carmela Tang PA-C  Authorized by: Carmela Tang PA-C   Comparison: compared with previous ECG from 9/17/2022  Similar to previous ECG  Rhythm: sinus rhythm  Rate: normal  BPM: 86    Clinical impression: normal ECG               Assessment:      Diagnosis Plan   1. Aortic valve disease   severe aortic stenosis on nitric echocardiogram.  Patient is relatively asymptomatic.   2. Nonsustained ventricular tachycardia   NSVT on Holter monitor with subsequent heart catheterization with normal coronary arteries.   3. Essential hypertension   acceptable control.  Continue current medications.   4. Tobacco abuse   we discussed the CV risk of continued tobacco use and strongly encourage cessation.     Plan:   Severe aortic stenosis on most recent echocardiogram.  Patient is relatively asymptomatic from this.  We discussed the option of proceeding with aortic valve replacement versus medical management for now.  We have recommended medical management due to the patient age and minimal symptomology and the short lifespan of prosthetic valve.  Patient is agreeable to this.  We discussed symptoms of worsening valvular disease and patient verbalizes understanding.  We will obtain an echocardiogram 1 week prior to follow-up visit in [place].  Tobacco cessation strongly advised.  Continue current medications.   FU in 6 MO, sooner as needed.  Thank you for allowing us to participate in the care of your patient.       Scribed for Andrey Gibbs MD by Carmela Tang PA-C. 3/31/2023  14:52 EDT     I, Andrey Gibbs MD, personally performed the services described in this documentation as scribed by the above named individual in my presence, and it is both accurate and complete.  4/1/2023  08:23 EDT        Part of this note may be an electronic transcription/translation of spoken language to printed text using the Dragon Dictation System.

## 2023-03-31 ENCOUNTER — OFFICE VISIT (OUTPATIENT)
Dept: CARDIOLOGY | Facility: CLINIC | Age: 68
End: 2023-03-31
Payer: MEDICARE

## 2023-03-31 VITALS
WEIGHT: 200 LBS | BODY MASS INDEX: 30.31 KG/M2 | HEIGHT: 68 IN | OXYGEN SATURATION: 97 % | SYSTOLIC BLOOD PRESSURE: 134 MMHG | DIASTOLIC BLOOD PRESSURE: 68 MMHG | HEART RATE: 86 BPM

## 2023-03-31 DIAGNOSIS — I35.9 AORTIC VALVE DISEASE: Primary | ICD-10-CM

## 2023-03-31 DIAGNOSIS — I47.29 NONSUSTAINED VENTRICULAR TACHYCARDIA: ICD-10-CM

## 2023-03-31 DIAGNOSIS — I10 ESSENTIAL HYPERTENSION: ICD-10-CM

## 2023-03-31 DIAGNOSIS — Z72.0 TOBACCO ABUSE: ICD-10-CM

## 2023-03-31 PROCEDURE — 1159F MED LIST DOCD IN RCRD: CPT | Performed by: INTERNAL MEDICINE

## 2023-03-31 PROCEDURE — 1160F RVW MEDS BY RX/DR IN RCRD: CPT | Performed by: INTERNAL MEDICINE

## 2023-03-31 PROCEDURE — 3078F DIAST BP <80 MM HG: CPT | Performed by: INTERNAL MEDICINE

## 2023-03-31 PROCEDURE — 3075F SYST BP GE 130 - 139MM HG: CPT | Performed by: INTERNAL MEDICINE

## 2023-03-31 PROCEDURE — 99214 OFFICE O/P EST MOD 30 MIN: CPT | Performed by: INTERNAL MEDICINE

## 2023-03-31 PROCEDURE — 93000 ELECTROCARDIOGRAM COMPLETE: CPT | Performed by: INTERNAL MEDICINE

## 2023-03-31 RX ORDER — LISINOPRIL 30 MG/1
1 TABLET ORAL 2 TIMES DAILY
COMMUNITY
Start: 2023-03-27

## 2023-03-31 RX ORDER — CLONIDINE HYDROCHLORIDE 0.1 MG/1
1 TABLET ORAL EVERY 6 HOURS PRN
COMMUNITY
Start: 2023-03-07

## 2023-03-31 RX ORDER — AMLODIPINE BESYLATE 5 MG/1
5 TABLET ORAL DAILY
COMMUNITY
Start: 2023-03-08

## 2023-03-31 RX ORDER — PRAMIPEXOLE DIHYDROCHLORIDE 0.5 MG/1
1 TABLET ORAL DAILY
COMMUNITY
Start: 2023-03-07

## 2023-07-26 ENCOUNTER — TELEPHONE (OUTPATIENT)
Dept: CARDIOLOGY | Facility: CLINIC | Age: 68
End: 2023-07-26
Payer: MEDICARE

## 2023-07-26 DIAGNOSIS — I35.0 AORTIC STENOSIS, SEVERE: ICD-10-CM

## 2023-07-26 DIAGNOSIS — I10 ESSENTIAL HYPERTENSION: ICD-10-CM

## 2023-07-26 DIAGNOSIS — I47.29 NONSUSTAINED VENTRICULAR TACHYCARDIA: ICD-10-CM

## 2023-07-26 DIAGNOSIS — I47.29 VENTRICULAR TACHYCARDIA (PAROXYSMAL): ICD-10-CM

## 2023-07-26 DIAGNOSIS — I35.9 AORTIC VALVE DISEASE: Primary | ICD-10-CM

## 2023-07-26 NOTE — TELEPHONE ENCOUNTER
"    Caller: Donn Robbins \"Hai\"    Relationship to patient: Self    Best call back number: [phone]    Chief complaint: PATIENT CONCERNED WITH HIS HEART VALVE. STATES HE CAN FEEL IT ACTING UP AND WOULD LIKE TO FOLLOW UP. WOULD ALSO LIKE TO HAVE ECHO MOVED UP SOONER.     Type of visit: FOLLOW UP     Requested date: ASAP     If rescheduling, when is the original appointment: 10.10.23               "

## 2023-07-26 NOTE — TELEPHONE ENCOUNTER
Patient called stating that he just feels like his valve is not working well. Recently he's been constantly fatigue and short of air with exertion.  He would like a sooner appointment to discuss a TAVR, as it was mentioned at last office visit on 03/31/2023.     Reached out to  and was able to get his echo moved up to today 07/27/2023.     Please advice. Thank you!

## 2023-07-27 ENCOUNTER — HOSPITAL ENCOUNTER (OUTPATIENT)
Dept: CARDIOLOGY | Facility: HOSPITAL | Age: 68
Discharge: HOME | End: 2023-07-27
Admitting: INTERNAL MEDICINE
Payer: MEDICARE

## 2023-07-27 ENCOUNTER — TELEPHONE (OUTPATIENT)
Dept: CARDIOLOGY | Facility: CLINIC | Age: 68
End: 2023-07-27
Payer: MEDICARE

## 2023-07-27 DIAGNOSIS — I47.29 NONSUSTAINED VENTRICULAR TACHYCARDIA: ICD-10-CM

## 2023-07-27 DIAGNOSIS — I35.9 AORTIC VALVE DISEASE: ICD-10-CM

## 2023-07-27 LAB
BH CV ECHO MEAS - AI P1/2T: 509.7 MSEC
BH CV ECHO MEAS - AO MAX PG: 68.7 MMHG
BH CV ECHO MEAS - AO MEAN PG: 36 MMHG
BH CV ECHO MEAS - AO ROOT DIAM: 2.9 CM
BH CV ECHO MEAS - AO V2 MAX: 414.3 CM/SEC
BH CV ECHO MEAS - AO V2 VTI: 88.1 CM
BH CV ECHO MEAS - AVA(I,D): 1.18 CM2
BH CV ECHO MEAS - EDV(CUBED): 97.3 ML
BH CV ECHO MEAS - EDV(MOD-SP4): 98.4 ML
BH CV ECHO MEAS - EF(MOD-SP4): 71.6 %
BH CV ECHO MEAS - ESV(CUBED): 32.8 ML
BH CV ECHO MEAS - ESV(MOD-SP4): 27.9 ML
BH CV ECHO MEAS - FS: 30.4 %
BH CV ECHO MEAS - IVS/LVPW: 1 CM
BH CV ECHO MEAS - IVSD: 1.4 CM
BH CV ECHO MEAS - LA DIMENSION: 4.1 CM
BH CV ECHO MEAS - LAT PEAK E' VEL: 8.2 CM/SEC
BH CV ECHO MEAS - LV DIASTOLIC VOL/BSA (35-75): 48.1 CM2
BH CV ECHO MEAS - LV MASS(C)D: 256.8 GRAMS
BH CV ECHO MEAS - LV MAX PG: 8.8 MMHG
BH CV ECHO MEAS - LV MEAN PG: 5 MMHG
BH CV ECHO MEAS - LV SYSTOLIC VOL/BSA (12-30): 13.7 CM2
BH CV ECHO MEAS - LV V1 MAX: 148 CM/SEC
BH CV ECHO MEAS - LV V1 VTI: 36.8 CM
BH CV ECHO MEAS - LVIDD: 4.6 CM
BH CV ECHO MEAS - LVIDS: 3.2 CM
BH CV ECHO MEAS - LVOT AREA: 2.8 CM2
BH CV ECHO MEAS - LVOT DIAM: 1.9 CM
BH CV ECHO MEAS - LVPWD: 1.4 CM
BH CV ECHO MEAS - MED PEAK E' VEL: 6 CM/SEC
BH CV ECHO MEAS - MV A MAX VEL: 156 CM/SEC
BH CV ECHO MEAS - MV E MAX VEL: 92.7 CM/SEC
BH CV ECHO MEAS - MV E/A: 0.59
BH CV ECHO MEAS - PA ACC TIME: 0.11 SEC
BH CV ECHO MEAS - SI(MOD-SP4): 34.5 ML/M2
BH CV ECHO MEAS - SV(LVOT): 104.3 ML
BH CV ECHO MEAS - SV(MOD-SP4): 70.5 ML
BH CV ECHO MEAS - TAPSE (>1.6): 2.9 CM
BH CV ECHO MEASUREMENTS AVERAGE E/E' RATIO: 13.06
LEFT ATRIUM VOLUME INDEX: 31 ML/M2

## 2023-07-27 PROCEDURE — 93306 TTE W/DOPPLER COMPLETE: CPT

## 2023-07-27 NOTE — TELEPHONE ENCOUNTER
"  Caller: Donn Serna \"Hai\"    Relationship: Self    Best call back number: [phone]    Caller requesting test results: HAI SERNA    What test was performed: ECHO    When was the test performed: 07-27-23    Where was the test performed: [facility]    Additional notes: PLEASE CONTACT PATIENT WITH THE RESULTS.          "

## 2023-07-27 NOTE — TELEPHONE ENCOUNTER
Called patient to relay results and recommendations.    Relayed that Melissa will be in contact with him.   Patient verbalized understanding and will call back if he has any questions or concerns.

## 2023-07-27 NOTE — TELEPHONE ENCOUNTER
Called patient to let him know results might take a couple of days.  Patient aware and verbalized understanding.

## 2023-07-27 NOTE — TELEPHONE ENCOUNTER
Andrey Gibbs MD   to Me  Melissa Espana, APRN        4:32 PM  It appears that he now has severe symptomatic aortic stenosis.  He meets the criteria for TAVR.    Ubaldo: Please let him know that we will start the process of arranging valve replacement and our valve coordinator will contact him for further directions .  Melissa :can you please contact him for education and start further evaluation.  He will need to be set up for cardiac cath and DARREN with me and we can get him to see Dr. Barnett at his [place] office.

## 2023-07-31 DIAGNOSIS — I35.0 AORTIC STENOSIS, SEVERE: ICD-10-CM

## 2023-07-31 DIAGNOSIS — I35.9 AORTIC VALVE DISEASE: Primary | ICD-10-CM

## 2023-08-02 ENCOUNTER — HOSPITAL ENCOUNTER (OUTPATIENT)
Facility: HOSPITAL | Age: 68
Setting detail: HOSPITAL OUTPATIENT SURGERY
End: 2023-08-02
Attending: INTERNAL MEDICINE | Admitting: INTERNAL MEDICINE
Payer: MEDICARE

## 2023-08-02 DIAGNOSIS — I47.29 VENTRICULAR TACHYCARDIA (PAROXYSMAL): ICD-10-CM

## 2023-08-02 DIAGNOSIS — I35.0 AORTIC STENOSIS, SEVERE: ICD-10-CM

## 2023-08-02 DIAGNOSIS — I35.9 AORTIC VALVE DISEASE: ICD-10-CM

## 2023-08-02 DIAGNOSIS — I47.29 NONSUSTAINED VENTRICULAR TACHYCARDIA: ICD-10-CM

## 2023-08-02 DIAGNOSIS — I10 ESSENTIAL HYPERTENSION: ICD-10-CM

## 2023-08-04 DIAGNOSIS — I35.0 AORTIC STENOSIS, SEVERE: Primary | ICD-10-CM

## 2023-08-07 ENCOUNTER — PREP FOR SURGERY (OUTPATIENT)
Dept: OTHER | Facility: HOSPITAL | Age: 68
End: 2023-08-07
Payer: MEDICARE

## 2023-08-07 RX ORDER — SODIUM CHLORIDE 9 MG/ML
40 INJECTION, SOLUTION INTRAVENOUS AS NEEDED
Status: CANCELLED | OUTPATIENT
Start: 2023-08-07

## 2023-08-07 RX ORDER — ASPIRIN 81 MG/1
81 TABLET ORAL DAILY
Status: CANCELLED | OUTPATIENT
Start: 2023-08-08

## 2023-08-07 RX ORDER — SODIUM CHLORIDE 0.9 % (FLUSH) 0.9 %
10 SYRINGE (ML) INJECTION AS NEEDED
Status: CANCELLED | OUTPATIENT
Start: 2023-08-07

## 2023-08-07 RX ORDER — SODIUM CHLORIDE 0.9 % (FLUSH) 0.9 %
10 SYRINGE (ML) INJECTION EVERY 12 HOURS SCHEDULED
Status: CANCELLED | OUTPATIENT
Start: 2023-08-07

## 2023-08-07 RX ORDER — ASPIRIN 81 MG/1
324 TABLET, CHEWABLE ORAL ONCE
Status: CANCELLED | OUTPATIENT
Start: 2023-08-07 | End: 2023-08-07

## 2023-08-10 ENCOUNTER — HOSPITAL ENCOUNTER (OUTPATIENT)
Dept: CARDIOLOGY | Facility: HOSPITAL | Age: 68
Discharge: HOME | End: 2023-08-10
Attending: INTERNAL MEDICINE | Admitting: INTERNAL MEDICINE
Payer: MEDICARE

## 2023-08-10 VITALS
SYSTOLIC BLOOD PRESSURE: 131 MMHG | OXYGEN SATURATION: 98 % | DIASTOLIC BLOOD PRESSURE: 84 MMHG | WEIGHT: 200 LBS | BODY MASS INDEX: 30.31 KG/M2 | HEIGHT: 68 IN | TEMPERATURE: 97 F | HEART RATE: 58 BPM | RESPIRATION RATE: 19 BRPM

## 2023-08-10 DIAGNOSIS — I47.29 NONSUSTAINED VENTRICULAR TACHYCARDIA: ICD-10-CM

## 2023-08-10 DIAGNOSIS — I35.9 AORTIC VALVE DISEASE: ICD-10-CM

## 2023-08-10 DIAGNOSIS — I47.29 VENTRICULAR TACHYCARDIA (PAROXYSMAL): ICD-10-CM

## 2023-08-10 DIAGNOSIS — I35.0 AORTIC STENOSIS, SEVERE: ICD-10-CM

## 2023-08-10 DIAGNOSIS — I10 ESSENTIAL HYPERTENSION: ICD-10-CM

## 2023-08-10 LAB
ANION GAP SERPL CALCULATED.3IONS-SCNC: 12 MMOL/L (ref 5–15)
BUN BLDA-MCNC: 24 MG/DL (ref 8–26)
BUN SERPL-MCNC: 25 MG/DL (ref 8–23)
BUN/CREAT SERPL: 21.9 (ref 7–25)
CA-I BLDA-SCNC: 1.15 MMOL/L (ref 1.2–1.32)
CALCIUM SPEC-SCNC: 9 MG/DL (ref 8.6–10.5)
CHLORIDE BLDA-SCNC: 106 MMOL/L (ref 98–109)
CHLORIDE SERPL-SCNC: 104 MMOL/L (ref 98–107)
CHOLEST SERPL-MCNC: 88 MG/DL (ref 0–200)
CO2 BLDA-SCNC: 22 MMOL/L (ref 24–29)
CO2 SERPL-SCNC: 22 MMOL/L (ref 22–29)
CREAT BLDA-MCNC: 1.1 MG/DL (ref 0.6–1.3)
CREAT SERPL-MCNC: 1.14 MG/DL (ref 0.76–1.27)
DEPRECATED RDW RBC AUTO: 44.6 FL (ref 37–54)
EGFRCR SERPLBLD CKD-EPI 2021: 70.9 ML/MIN/1.73
EGFRCR SERPLBLD CKD-EPI 2021: 74 ML/MIN/1.73
ERYTHROCYTE [DISTWIDTH] IN BLOOD BY AUTOMATED COUNT: 13 % (ref 12.3–15.4)
GLUCOSE BLDC GLUCOMTR-MCNC: 95 MG/DL (ref 70–130)
GLUCOSE SERPL-MCNC: 97 MG/DL (ref 65–99)
HBA1C MFR BLD: 5.3 % (ref 4.8–5.6)
HCT VFR BLD AUTO: 44.6 % (ref 37.5–51)
HCT VFR BLDA CALC: 44 % (ref 38–51)
HDLC SERPL-MCNC: 29 MG/DL (ref 40–60)
HGB BLD-MCNC: 14.9 G/DL (ref 13–17.7)
HGB BLDA-MCNC: 15 G/DL (ref 12–17)
LDLC SERPL CALC-MCNC: 35 MG/DL (ref 0–100)
LDLC/HDLC SERPL: 1.1 {RATIO}
MCH RBC QN AUTO: 31.3 PG (ref 26.6–33)
MCHC RBC AUTO-ENTMCNC: 33.4 G/DL (ref 31.5–35.7)
MCV RBC AUTO: 93.7 FL (ref 79–97)
PLATELET # BLD AUTO: 98 10*3/MM3 (ref 140–450)
POTASSIUM BLDA-SCNC: 4.8 MMOL/L (ref 3.5–4.9)
POTASSIUM SERPL-SCNC: 5.1 MMOL/L (ref 3.5–5.2)
RBC # BLD AUTO: 4.76 10*6/MM3 (ref 4.14–5.8)
SODIUM BLD-SCNC: 138 MMOL/L (ref 138–146)
SODIUM SERPL-SCNC: 138 MMOL/L (ref 136–145)
TRIGL SERPL-MCNC: 135 MG/DL (ref 0–150)
VLDLC SERPL-MCNC: 24 MG/DL (ref 5–40)
WBC NRBC COR # BLD: 15 10*3/MM3 (ref 3.4–10.8)

## 2023-08-10 PROCEDURE — 25010000002 HEPARIN (PORCINE) PER 1000 UNITS: Performed by: INTERNAL MEDICINE

## 2023-08-10 PROCEDURE — 93321 DOPPLER ECHO F-UP/LMTD STD: CPT | Performed by: INTERNAL MEDICINE

## 2023-08-10 PROCEDURE — 85027 COMPLETE CBC AUTOMATED: CPT

## 2023-08-10 PROCEDURE — 80061 LIPID PANEL: CPT

## 2023-08-10 PROCEDURE — 93312 ECHO TRANSESOPHAGEAL: CPT | Performed by: INTERNAL MEDICINE

## 2023-08-10 PROCEDURE — 93325 DOPPLER ECHO COLOR FLOW MAPG: CPT

## 2023-08-10 PROCEDURE — 83036 HEMOGLOBIN GLYCOSYLATED A1C: CPT

## 2023-08-10 PROCEDURE — S0260 H&P FOR SURGERY: HCPCS | Performed by: INTERNAL MEDICINE

## 2023-08-10 PROCEDURE — 93321 DOPPLER ECHO F-UP/LMTD STD: CPT

## 2023-08-10 PROCEDURE — 93325 DOPPLER ECHO COLOR FLOW MAPG: CPT | Performed by: INTERNAL MEDICINE

## 2023-08-10 PROCEDURE — 85014 HEMATOCRIT: CPT

## 2023-08-10 PROCEDURE — 93458 L HRT ARTERY/VENTRICLE ANGIO: CPT | Performed by: INTERNAL MEDICINE

## 2023-08-10 PROCEDURE — 80047 BASIC METABLC PNL IONIZED CA: CPT

## 2023-08-10 PROCEDURE — C1894 INTRO/SHEATH, NON-LASER: HCPCS | Performed by: INTERNAL MEDICINE

## 2023-08-10 PROCEDURE — 25010000002 NITROGLYCERIN 100-5 MCG/ML-% SOLUTION: Performed by: INTERNAL MEDICINE

## 2023-08-10 PROCEDURE — 25510000001 IOPAMIDOL PER 1 ML: Performed by: INTERNAL MEDICINE

## 2023-08-10 PROCEDURE — 93454 CORONARY ARTERY ANGIO S&I: CPT | Performed by: INTERNAL MEDICINE

## 2023-08-10 PROCEDURE — 93312 ECHO TRANSESOPHAGEAL: CPT

## 2023-08-10 PROCEDURE — C1769 GUIDE WIRE: HCPCS | Performed by: INTERNAL MEDICINE

## 2023-08-10 PROCEDURE — C1887 CATHETER, GUIDING: HCPCS | Performed by: INTERNAL MEDICINE

## 2023-08-10 PROCEDURE — 80048 BASIC METABOLIC PNL TOTAL CA: CPT

## 2023-08-10 PROCEDURE — 25010000002 MIDAZOLAM PER 1 MG: Performed by: INTERNAL MEDICINE

## 2023-08-10 RX ORDER — ACETAMINOPHEN 325 MG/1
650 TABLET ORAL EVERY 4 HOURS PRN
Status: DISCONTINUED | OUTPATIENT
Start: 2023-08-10 | End: 2023-08-10 | Stop reason: HOSPADM

## 2023-08-10 RX ORDER — SODIUM CHLORIDE 0.9 % (FLUSH) 0.9 %
10 SYRINGE (ML) INJECTION EVERY 12 HOURS SCHEDULED
Status: DISCONTINUED | OUTPATIENT
Start: 2023-08-10 | End: 2023-08-10 | Stop reason: HOSPADM

## 2023-08-10 RX ORDER — NICARDIPINE HCL-0.9% SOD CHLOR 1 MG/10 ML
SYRINGE (ML) INTRAVENOUS
Status: DISCONTINUED | OUTPATIENT
Start: 2023-08-10 | End: 2023-08-10 | Stop reason: HOSPADM

## 2023-08-10 RX ORDER — MIDAZOLAM HYDROCHLORIDE 1 MG/ML
2-20 INJECTION INTRAMUSCULAR; INTRAVENOUS ONCE AS NEEDED
Status: DISCONTINUED | OUTPATIENT
Start: 2023-08-10 | End: 2023-08-10 | Stop reason: HOSPADM

## 2023-08-10 RX ORDER — MIDAZOLAM HYDROCHLORIDE 1 MG/ML
INJECTION INTRAMUSCULAR; INTRAVENOUS
Status: DISCONTINUED | OUTPATIENT
Start: 2023-08-10 | End: 2023-08-10 | Stop reason: HOSPADM

## 2023-08-10 RX ORDER — SODIUM CHLORIDE 9 MG/ML
40 INJECTION, SOLUTION INTRAVENOUS AS NEEDED
Status: DISCONTINUED | OUTPATIENT
Start: 2023-08-10 | End: 2023-08-10 | Stop reason: HOSPADM

## 2023-08-10 RX ORDER — SODIUM CHLORIDE 9 MG/ML
3 INJECTION, SOLUTION INTRAVENOUS ONCE
Status: COMPLETED | OUTPATIENT
Start: 2023-08-10 | End: 2023-08-10

## 2023-08-10 RX ORDER — SODIUM CHLORIDE 0.9 % (FLUSH) 0.9 %
10 SYRINGE (ML) INJECTION AS NEEDED
Status: DISCONTINUED | OUTPATIENT
Start: 2023-08-10 | End: 2023-08-10 | Stop reason: HOSPADM

## 2023-08-10 RX ORDER — FENTANYL CITRATE 50 UG/ML
50-100 INJECTION, SOLUTION INTRAMUSCULAR; INTRAVENOUS ONCE AS NEEDED
Status: DISCONTINUED | OUTPATIENT
Start: 2023-08-10 | End: 2023-08-10

## 2023-08-10 RX ORDER — ASPIRIN 81 MG/1
81 TABLET ORAL DAILY
Status: DISCONTINUED | OUTPATIENT
Start: 2023-08-11 | End: 2023-08-10 | Stop reason: HOSPADM

## 2023-08-10 RX ORDER — HEPARIN SODIUM 1000 [USP'U]/ML
INJECTION, SOLUTION INTRAVENOUS; SUBCUTANEOUS
Status: DISCONTINUED | OUTPATIENT
Start: 2023-08-10 | End: 2023-08-10 | Stop reason: HOSPADM

## 2023-08-10 RX ORDER — NITROGLYCERIN 0.4 MG/1
0.4 TABLET SUBLINGUAL
Status: DISCONTINUED | OUTPATIENT
Start: 2023-08-10 | End: 2023-08-10 | Stop reason: HOSPADM

## 2023-08-10 RX ORDER — NALOXONE HCL 0.4 MG/ML
0.4 VIAL (ML) INJECTION ONCE AS NEEDED
Status: DISCONTINUED | OUTPATIENT
Start: 2023-08-10 | End: 2023-08-10 | Stop reason: HOSPADM

## 2023-08-10 RX ORDER — ASPIRIN 81 MG/1
324 TABLET, CHEWABLE ORAL ONCE
Status: COMPLETED | OUTPATIENT
Start: 2023-08-10 | End: 2023-08-10

## 2023-08-10 RX ORDER — FENTANYL CITRATE 50 UG/ML
50-200 INJECTION, SOLUTION INTRAMUSCULAR; INTRAVENOUS ONCE AS NEEDED
Status: DISCONTINUED | OUTPATIENT
Start: 2023-08-10 | End: 2023-08-10 | Stop reason: HOSPADM

## 2023-08-10 RX ORDER — SODIUM CHLORIDE 9 MG/ML
100 INJECTION, SOLUTION INTRAVENOUS CONTINUOUS
Status: DISCONTINUED | OUTPATIENT
Start: 2023-08-10 | End: 2023-08-10 | Stop reason: HOSPADM

## 2023-08-10 RX ORDER — MIDAZOLAM HYDROCHLORIDE 1 MG/ML
INJECTION INTRAMUSCULAR; INTRAVENOUS
Status: COMPLETED | OUTPATIENT
Start: 2023-08-10 | End: 2023-08-10

## 2023-08-10 RX ORDER — LIDOCAINE HYDROCHLORIDE 10 MG/ML
INJECTION, SOLUTION EPIDURAL; INFILTRATION; INTRACAUDAL; PERINEURAL
Status: DISCONTINUED | OUTPATIENT
Start: 2023-08-10 | End: 2023-08-10 | Stop reason: HOSPADM

## 2023-08-10 RX ORDER — FLUMAZENIL 0.1 MG/ML
0.5 INJECTION INTRAVENOUS ONCE AS NEEDED
Status: DISCONTINUED | OUTPATIENT
Start: 2023-08-10 | End: 2023-08-10 | Stop reason: HOSPADM

## 2023-08-10 RX ADMIN — MIDAZOLAM HYDROCHLORIDE 2 MG: 1 INJECTION, SOLUTION INTRAMUSCULAR; INTRAVENOUS at 11:10

## 2023-08-10 RX ADMIN — ASPIRIN 324 MG: 81 TABLET, CHEWABLE ORAL at 09:47

## 2023-08-10 RX ADMIN — SODIUM CHLORIDE 3 ML/KG/HR: 9 INJECTION, SOLUTION INTRAVENOUS at 10:55

## 2023-08-10 RX ADMIN — MIDAZOLAM HYDROCHLORIDE 2 MG: 1 INJECTION, SOLUTION INTRAMUSCULAR; INTRAVENOUS at 11:14

## 2023-08-10 NOTE — CONSULTS
Cardiothoracic Surgery History & Physical           Chief complaint: aortic stenosis    HPI:   Mr. Hai Robbins is a 69yo male with PMH of bicuspid aortic valve, HTN, HLD, and tobacco use who underwent cardiac catheterization today with Dr. Gibbs in preparation for aortic valve surgery. He was found to have angiographically normal coronary arteries.  Dr. Barnett was consulted for evaluation for TAVR. The patient reports that he has been followed for many years for his bicuspid valve, but he has not had any symptoms. He denies lower extremity edema, chest pain, shortness of breath.       Past Medical History:   Diagnosis Date    Aortic stenosis, severe 8/2/2023    Arthritis     High cholesterol     Hypertension     Kidney stones     Lumbar herniated disc      Past Surgical History:   Procedure Laterality Date    CARDIAC CATHETERIZATION N/A 8/14/2017    Procedure: Left Heart Cath;  Surgeon: Emil Nagel MD;  Location:  [facility];  Service:     CARDIAC ELECTROPHYSIOLOGY PROCEDURE N/A 12/4/2017    Procedure: EPS with epi challange;  Surgeon: Oscar Rowe MD;  Location:  [facility] EP INVASIVE LOCATION;  Service:     COLONOSCOPY       Family History   Problem Relation Age of Onset    Diabetes type II Mother     Heart failure Father     No Known Problems Sister      Social History     Tobacco Use    Smoking status: Every Day     Packs/day: 1.00     Years: 42.00     Pack years: 42.00     Types: Cigarettes    Smokeless tobacco: Never   Substance Use Topics    Alcohol use: Yes     Alcohol/week: 5.0 standard drinks     Types: 5 Cans of beer per week     Comment: 4-5 BEERS PER DAY; 8/10/23--STATES DRINKS 1 GLASS OF WINE PER NIGHT    Drug use: No       Medications Prior to Admission   Medication Sig Dispense Refill Last Dose    amLODIPine (NORVASC) 5 MG tablet Take 1 tablet by mouth Daily. for blood pressure   8/10/2023    atorvastatin (LIPITOR) 20 MG tablet Take 1 tablet by mouth Daily.   8/10/2023     lisinopril (PRINIVIL,ZESTRIL) 30 MG tablet 1 tablet 2 (Two) Times a Day.   8/10/2023    metoprolol succinate XL (TOPROL-XL) 50 MG 24 hr tablet Take 1 tablet by mouth Daily. for blood pressure  5 8/10/2023    pramipexole (MIRAPEX) 0.5 MG tablet Take 1 tablet by mouth Daily.   8/10/2023    aspirin 81 MG EC tablet Take 1 tablet by mouth Daily.   8/8/2023    cloNIDine (CATAPRES) 0.1 MG tablet Take 1 tablet by mouth Every 6 (Six) Hours As Needed.   More than a month     Allergies:  Patient has no known allergies.    Review of Systems:  A comprehensive review of systems was negative except for:     All other systems were reviewed and were negative.    Vital Signs:  Temp:  [97 øF (36.1 øC)] 97 øF (36.1 øC)  Heart Rate:  [55-79] 58  Resp:  [16-19] 19  BP: ()/() 145/57    Physical Exam:  Physical Exam  Vitals and nursing note reviewed.   Constitutional:       Appearance: Normal appearance.   HENT:      Head: Normocephalic and atraumatic.      Mouth/Throat:      Mouth: Mucous membranes are moist.   Eyes:      Pupils: Pupils are equal, round, and reactive to light.   Cardiovascular:      Rate and Rhythm: Normal rate and regular rhythm.      Pulses: Normal pulses.      Heart sounds: Murmur heard.   Systolic murmur is present.   Pulmonary:      Effort: Pulmonary effort is normal.      Breath sounds: Normal breath sounds.   Abdominal:      General: Abdomen is flat.      Palpations: Abdomen is soft.   Musculoskeletal:      Right lower leg: No edema.      Left lower leg: No edema.   Skin:     General: Skin is warm and dry.      Capillary Refill: Capillary refill takes less than 2 seconds.   Neurological:      Mental Status: He is oriented to person, place, and time.   Psychiatric:         Mood and Affect: Mood normal.         Behavior: Behavior normal.       Labs:  Results from last 7 days   Lab Units 08/10/23  0932 08/10/23  0925   WBC 10*3/mm3  --  15.00*   HEMOGLOBIN g/dL  --  14.9   HEMOGLOBIN, POC g/dL 15.0  --     HEMATOCRIT %  --  44.6   HEMATOCRIT POC % 44  --    PLATELETS 10*3/mm3  --  98*     Results from last 7 days   Lab Units 08/10/23  0932 08/10/23  0925   SODIUM mmol/L  --  138   POTASSIUM mmol/L  --  5.1   CHLORIDE mmol/L  --  104   CO2 mmol/L  --  22.0   BUN mg/dL  --  25*   CREATININE mg/dL 1.10 1.14   GLUCOSE mg/dL  --  97   CALCIUM mg/dL  --  9.0         Coagulation: No results found for: INR, APTT  Cardiac markers:     ABGs:       Invalid input(s): PO2    Imaging:     Conclusion    FINAL IMPRESSION:  Angiographically normal coronary arteries.     RECOMMENDATIONS:  Proceed to further evaluation and scheduling for TAVR.     Indications: Severe/symptomatic aortic stenosis.  Assessment of coronary anatomy before aortic valve replacement.     Access: Left radial.     Procedures:   Left heart catheterization.  Left ventriculogram.  Selective coronary angiography.  Arterial site hemostasis with radial band.     Procedure narrative:  The patient was brought to the catheterization lab in a fasting condition.  Access site was prepped and draped in standard sterile fashion.  Lidocaine was injected and arterial access was obtained by percutaneous anterior wall puncture technique.  A 6 [place] arterial sheath was placed. Above procedures were performed without complications.  At the conclusion the arterial sheath was removed and hemostasis was achieved.  The patient was transferred to the unit in a stable condition.         Angiographic Findings:  Left coronary dominance.  LM: Angiographically normal.  LAD: Angiographically normal.  LCX: Dominant vessel, angiographically normal.  RCA: Nondominant vessel, angiographically normal.     Complications: No acute procedure related complications.       Assessment:     Nonsustained ventricular tachycardia    Essential hypertension    Aortic valve disease    Ventricular tachycardia (paroxysmal)    Aortic stenosis, severe         Plan:   Continue preoperative workup, labs and imaging  in preparation for TAVR with Dr. Barnett  Patient is scheduled for TAVR CTA and will see Dr. Barnett in the office        Meagan Oviedo PA-C  08/10/23  16:28 EDT

## 2023-08-10 NOTE — H&P
"  Pre-Procedure History and Physical  [facility]      Patient:  Donn Robbins  :  1955  MRN: [id number]    PCP:  Alonso Lakhani PA  PHONE:  [phone]    DATE: 8/10/2023  ID: Donn Robbins is a 68 y.o. male     CC: severe aortic stenosis    PROBLEM LIST:   Aortic stenosis  Echo 2017: EF 60%, grade 1 diastolic dysfunction, aortic valve is functionally bicuspid as the noncoronary leaflet is immobile.  There is moderate aortic stenosis and moderate AI.  Mild MR  Echo 2019: EF 60%.  Aortic valve is calcified there is moderate aortic stenosis and moderate AI.  Echo 2022: EF 66-70%, moderate to severe aortic stenosis (YAHAIRA 1.2 cmý, mean gradient 34, max gradient 62, peak velocity 394 cm/s)  Echo 3/8/2023: EF 61-65%, grade 1 diastolic dysfunction, severe aortic stenosis present (mean gradient 36.4 mmHg, max 68.5, YAHAIRA 1.33, peak velocity 413 cm/s)  Echo 2023: Ef 66-70%, severe aortic valve stenosis present (max gradient 72, mean gradient 40, YAHAIRA 1.18, peak velocity 414cm/sec  Nonsustained ventricular tachycardia  LHC 2017: Normal coronary arteries with normal LV systolic function  Hypertension  Dyslipidemia  Tobacco use    BRIEF HPI:  Patient is a 68-year-old male who was initially referred to us for severe aortic stenosis.  When he was seen in our office in April of this year he was asymptomatic from a heart valve standpoint.  He did call our office approximately 1 month ago and stated that he felt like his valve was \"acting up.\"  He requested to have his echocardiogram moved to a sooner date.  Repeat echocardiogram was obtained in [place] and this did show progression of aortic stenosis.  He presents today for left heart catheterization and DARREN as part of consideration for TAVR.        Allergies:      No Known Allergies    MEDICATIONS:  Current Outpatient Medications   Medication Instructions    amLODIPine (NORVASC) 5 mg, Oral, Daily, for " "blood pressure    aspirin 81 mg, Oral, Daily    atorvastatin (LIPITOR) 20 mg, Oral, Daily    cloNIDine (CATAPRES) 0.1 MG tablet 1 tablet, Oral, Every 6 Hours PRN    lisinopril (PRINIVIL,ZESTRIL) 30 MG tablet 1 tablet, 2 Times Daily    metoprolol succinate XL (TOPROL-XL) 50 mg, Oral, Daily, for blood pressure    pramipexole (MIRAPEX) 0.5 MG tablet 1 tablet, Oral, Daily       Past medical & surgical history, social and family history reviewed in the electronic medical record.    ROS: Pertinent positives listed in the HPI and problem list above. All others reviewed and negative.     Physical Exam:   /56 (BP Location: Left arm, Patient Position: Lying)   Pulse 60   Temp 97 øF (36.1 øC) (Tympanic)   Resp 16   Ht 172.7 cm (68\")   Wt 87.3 kg (192 lb 7.4 oz)   SpO2 95%   BMI 29.26 kg/mý     Constitutional:    Alert, cooperative, in no acute distress   Neck:     No Jugular venous distention, adenopathy, or thyromegaly noted.    Heart:    Regular rhythm and normal rate, normal S1 and S2, 3/6 LE.   Lungs:     Clear to auscultation bilaterally, respirations regular, even and unlabored    Abdomen:     Soft nontender, nondistended, normal bowel sounds   Extremities:   No gross deformities, no edema, clubbing, or cyanosis.    Pulses:   Peripheral pulses palpable and equal bilaterally.     Barbaeu Test:  Left: Normal  (oxymetric Allens) Right: Abnormal    Labs and Diagnostic Data:  Results from last 7 days   Lab Units 08/10/23  0932   CREATININE mg/dL 1.10     Results from last 7 days   Lab Units 08/10/23  0932   HEMOGLOBIN, POC g/dL 15.0   HEMATOCRIT POC % 44     Lab Results   Component Value Date    CHOL 155 08/13/2017    TRIG 237 (H) 08/13/2017    HDL 43 08/13/2017     08/13/2017    AST 24 09/16/2022    ALT 16 09/16/2022                   EKG/Tele: NSR    IMPRESSION:  Patient is a 68-year-old male with severe symptomatic aortic stenosis who presents today for DARREN for further evaluation of aortic valve and " left heart catheterization for ischemic evaluation as part of the workup for consideration of TAVR.      PLAN:  Procedure to perform: DARREN, LHC +/- CBI. Risks, benefits and alternatives to the procedure explained to the patient and he understands and wishes to proceed.     Carmela Tang PA-C

## 2023-08-11 ENCOUNTER — DOCUMENTATION (OUTPATIENT)
Dept: CARDIAC REHAB | Facility: HOSPITAL | Age: 68
End: 2023-08-11
Payer: MEDICARE

## 2023-08-11 ENCOUNTER — DOCUMENTATION (OUTPATIENT)
Dept: CARDIOLOGY | Facility: HOSPITAL | Age: 68
End: 2023-08-11
Payer: MEDICARE

## 2023-08-11 LAB
BH CV ECHO MEAS - AO MAX PG: 74 MMHG
BH CV ECHO MEAS - AO MEAN PG: 38 MMHG
BH CV ECHO MEAS - AO ROOT DIAM: 2.5 CM
BH CV ECHO MEAS - AO V2 MAX: 429 CM/SEC
BH CV ECHO MEAS - AO V2 VTI: 89.5 CM
BH CV ECHO MEAS - MV MAX PG: 23.2 MMHG
BH CV ECHO MEAS - MV MEAN PG: 8 MMHG
BH CV ECHO MEAS - MV V2 VTI: 56.5 CM
BH CV VAS BP LEFT ARM: NORMAL MMHG
LV EF 2D ECHO EST: 60 %

## 2023-08-11 NOTE — PROGRESS NOTES
Referral received for Phase II Cardiac Rehab.  Staff has reviewed chart and patient does not have a qualifying diagnosis for Phase II Cardiac Rehab at this time. Patient is in process of TAVR work up. Staff will follow up with patient after procedure.

## 2023-08-11 NOTE — PROGRESS NOTES
CT on 8/10 after C and DARREN per Dr. Gibbs. Patient declined to stay for TAVR CT, but is agreeable to keep outpatient apt on 8/22. Will follow up after CT and apt with Dr. Barnett.

## 2023-08-22 ENCOUNTER — HOSPITAL ENCOUNTER (OUTPATIENT)
Dept: CT IMAGING | Facility: HOSPITAL | Age: 68
Discharge: HOME | End: 2023-08-22
Admitting: INTERNAL MEDICINE
Payer: MEDICARE

## 2023-08-22 ENCOUNTER — DOCUMENTATION (OUTPATIENT)
Dept: CARDIOLOGY | Facility: CLINIC | Age: 68
End: 2023-08-22
Payer: MEDICARE

## 2023-08-22 ENCOUNTER — OFFICE VISIT (OUTPATIENT)
Dept: CARDIAC SURGERY | Facility: CLINIC | Age: 68
End: 2023-08-22
Payer: MEDICARE

## 2023-08-22 VITALS
HEART RATE: 63 BPM | DIASTOLIC BLOOD PRESSURE: 50 MMHG | TEMPERATURE: 97.8 F | BODY MASS INDEX: 30.2 KG/M2 | WEIGHT: 198.6 LBS | SYSTOLIC BLOOD PRESSURE: 121 MMHG | OXYGEN SATURATION: 98 %

## 2023-08-22 VITALS
WEIGHT: 200 LBS | HEIGHT: 68 IN | DIASTOLIC BLOOD PRESSURE: 64 MMHG | SYSTOLIC BLOOD PRESSURE: 158 MMHG | BODY MASS INDEX: 30.31 KG/M2 | TEMPERATURE: 96.8 F | OXYGEN SATURATION: 97 % | HEART RATE: 61 BPM | RESPIRATION RATE: 18 BRPM

## 2023-08-22 DIAGNOSIS — I35.0 AORTIC STENOSIS, SEVERE: ICD-10-CM

## 2023-08-22 DIAGNOSIS — I35.0 AORTIC STENOSIS, SEVERE: Primary | ICD-10-CM

## 2023-08-22 DIAGNOSIS — I35.9 AORTIC VALVE DISEASE: ICD-10-CM

## 2023-08-22 DIAGNOSIS — I35.9 AORTIC VALVE DISEASE: Primary | ICD-10-CM

## 2023-08-22 PROCEDURE — 82565 ASSAY OF CREATININE: CPT

## 2023-08-22 PROCEDURE — 3074F SYST BP LT 130 MM HG: CPT | Performed by: THORACIC SURGERY (CARDIOTHORACIC VASCULAR SURGERY)

## 2023-08-22 PROCEDURE — 1159F MED LIST DOCD IN RCRD: CPT | Performed by: THORACIC SURGERY (CARDIOTHORACIC VASCULAR SURGERY)

## 2023-08-22 PROCEDURE — 1160F RVW MEDS BY RX/DR IN RCRD: CPT | Performed by: THORACIC SURGERY (CARDIOTHORACIC VASCULAR SURGERY)

## 2023-08-22 PROCEDURE — 3078F DIAST BP <80 MM HG: CPT | Performed by: THORACIC SURGERY (CARDIOTHORACIC VASCULAR SURGERY)

## 2023-08-22 PROCEDURE — 74174 CTA ABD&PLVS W/CONTRAST: CPT

## 2023-08-22 PROCEDURE — 71275 CT ANGIOGRAPHY CHEST: CPT

## 2023-08-22 PROCEDURE — 25510000001 IOPAMIDOL PER 1 ML: Performed by: INTERNAL MEDICINE

## 2023-08-22 PROCEDURE — 99204 OFFICE O/P NEW MOD 45 MIN: CPT | Performed by: THORACIC SURGERY (CARDIOTHORACIC VASCULAR SURGERY)

## 2023-08-22 RX ORDER — METOPROLOL TARTRATE 50 MG/1
100 TABLET, FILM COATED ORAL ONCE AS NEEDED
Status: DISCONTINUED | OUTPATIENT
Start: 2023-08-22 | End: 2023-08-23 | Stop reason: HOSPADM

## 2023-08-22 RX ORDER — METOPROLOL TARTRATE 50 MG/1
50 TABLET, FILM COATED ORAL ONCE AS NEEDED
Status: DISCONTINUED | OUTPATIENT
Start: 2023-08-22 | End: 2023-08-23 | Stop reason: HOSPADM

## 2023-08-22 RX ORDER — ALBUTEROL SULFATE 90 UG/1
AEROSOL, METERED RESPIRATORY (INHALATION) SEE ADMIN INSTRUCTIONS
COMMUNITY
Start: 2023-06-02

## 2023-08-22 RX ORDER — OXYCODONE AND ACETAMINOPHEN 10; 325 MG/1; MG/1
1 TABLET ORAL EVERY 12 HOURS PRN
COMMUNITY
Start: 2023-07-27

## 2023-08-22 RX ORDER — LIDOCAINE HYDROCHLORIDE 10 MG/ML
0.5 INJECTION, SOLUTION EPIDURAL; INFILTRATION; INTRACAUDAL; PERINEURAL ONCE AS NEEDED
Status: DISCONTINUED | OUTPATIENT
Start: 2023-08-22 | End: 2023-08-23 | Stop reason: HOSPADM

## 2023-08-22 RX ORDER — SODIUM CHLORIDE 0.9 % (FLUSH) 0.9 %
10 SYRINGE (ML) INJECTION AS NEEDED
Status: DISCONTINUED | OUTPATIENT
Start: 2023-08-22 | End: 2023-08-23 | Stop reason: HOSPADM

## 2023-08-22 RX ADMIN — IOPAMIDOL 100 ML: 755 INJECTION, SOLUTION INTRAVENOUS at 14:41

## 2023-08-22 NOTE — PROGRESS NOTES
Met with patient after consult with Dr. Barnett. He reports WEEMS and dizziness. 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 We discussed SAVR vs. TAVR, pre-procedural testing requirements, hospitalization and recovery expectations. Goals of care were discussed with the patient. A shared decision making approach was used as we discussed the patient's treatment options for severe symptomatic aortic stenosis.  Treatment options include TAVR, SAVR and medical management.  Risks and benefits of transcatheter aortic valve replacement (TAVR) versus surgical aortic valve replacement (SAVR) were discussed. Risks include, but are not limited to, death, stroke, bleeding, vascular injury, heart rhythm disturbance (including possible need for permanent pacemaker), infection, heart attack, kidney failure and other organ failure.     Will need a carotid ultrasound per Dr. Barnett, order placed. Tentative TAVR date 9/11.       NYHA II  KCCQ 46/70  5MWT 6.0s/5 meters

## 2023-08-22 NOTE — H&P (VIEW-ONLY)
08/22/2023  Patient Information  Donn Robbins                                                                                          [address]  [place] [address]   1955  'PCP/Referring Physician'  Alonso Lakhani PA  [phone]  Melissa Espana APRN  [phone]  Chief Complaint   Patient presents with    Cardiac Valve Problem     Referred by PHILIP García for severe aortic stenosis/ TAVR evaluation. Patient is easily fatigued and has shortness of breath with exertion.       History of Present Illness: 68-year-old  male with a history of hypertension, hyperlipidemia, nonsustained ventricular tachycardia and active tobacco abuse who presents with fatigue and shortness of breath in the setting of newly diagnosed aortic valve stenosis.  Over the past year, the patient notes fatigue and shortness of breath.  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  He denies chest pain, dizziness or syncope.      Patient Active Problem List   Diagnosis    Nonsustained ventricular tachycardia    Essential hypertension    Bilateral carotid bruits    Tobacco abuse    Aortic valve disease    Hyperlipidemia LDL goal <100    Ventricular tachycardia (paroxysmal)    Aortic stenosis, severe     Past Medical History:   Diagnosis Date    Aortic stenosis, severe 08/02/2023    Arthritis     Gastric ulcer     High cholesterol     Hypertension     Kidney stones     Lumbar herniated disc      Past Surgical History:   Procedure Laterality Date    CARDIAC CATHETERIZATION N/A 8/14/2017    Procedure: Left Heart Cath;  Surgeon: Emil Nagel MD;  Location:  [facility];  Service:     CARDIAC CATHETERIZATION N/A 8/10/2023    Procedure: Left Heart Cath;  Surgeon: Andrey Gibbs MD;  Location:  [facility];  Service: Cardiology;  Laterality: N/A;    CARDIAC ELECTROPHYSIOLOGY PROCEDURE N/A 12/4/2017    Procedure: EPS with epi  wil;  Surgeon: Oscar Rowe MD;  Location: [facility] INVASIVE LOCATION;  Service:     COLONOSCOPY         Current Outpatient Medications:     albuterol sulfate  (90 Base) MCG/ACT inhaler, Inhale See Admin Instructions. Inhale 2 puffs by mouth every 4 to 6 hours as needed, Disp: , Rfl:     amLODIPine (NORVASC) 5 MG tablet, Take 1 tablet by mouth Daily. for blood pressure, Disp: , Rfl:     aspirin 81 MG EC tablet, Take 1 tablet by mouth Daily., Disp: , Rfl:     atorvastatin (LIPITOR) 20 MG tablet, Take 1 tablet by mouth Daily., Disp: , Rfl:     cloNIDine (CATAPRES) 0.1 MG tablet, Take 1 tablet by mouth Every 6 (Six) Hours As Needed., Disp: , Rfl:     lisinopril (PRINIVIL,ZESTRIL) 30 MG tablet, 1 tablet 2 (Two) Times a Day., Disp: , Rfl:     metoprolol succinate XL (TOPROL-XL) 50 MG 24 hr tablet, Take 1 tablet by mouth Daily. for blood pressure, Disp: , Rfl: 5    oxyCODONE-acetaminophen (PERCOCET)  MG per tablet, Take 1 tablet by mouth Every 12 (Twelve) Hours As Needed., Disp: , Rfl:     pramipexole (MIRAPEX) 0.5 MG tablet, Take 1 tablet by mouth Daily., Disp: , Rfl:   No current facility-administered medications for this visit.    Facility-Administered Medications Ordered in Other Visits:     lidocaine PF 1% (XYLOCAINE) injection 0.5 mL, 0.5 mL, Intradermal, Once PRN, Sean De La Rosa MD    metoprolol tartrate (LOPRESSOR) injection 5 mg, 5 mg, Intravenous, Q5 Min PRN, Sean De La Rosa MD    metoprolol tartrate (LOPRESSOR) tablet 100 mg, 100 mg, Oral, Once PRN, Sean De La Rosa MD    metoprolol tartrate (LOPRESSOR) tablet 50 mg, 50 mg, Oral, Once PRN, Sean De La Rosa MD    sodium chloride 0.9 % flush 10 mL, 10 mL, Intravenous, PRN, Sean De La Rosa MD  No Known Allergies  Social History     Socioeconomic History    Marital status: Single    Number of children: 2   Tobacco Use    Smoking status: Every Day     Packs/day: 1.00     Years: 42.00     Pack years: 42.00     Types: Cigarettes     Smokeless tobacco: Never   Vaping Use    Vaping Use: Never used   Substance and Sexual Activity    Alcohol use: Not Currently     Alcohol/week: 7.0 standard drinks     Types: 7 Glasses of wine per week     Comment: 4-5 BEERS PER DAY; 8/10/23--STATES DRINKS 1 GLASS OF WINE PER NIGHT    Drug use: No    Sexual activity: Defer     Family History   Problem Relation Age of Onset    Diabetes type II Mother     Heart failure Father     No Known Problems Sister      Review of Systems   Constitutional: Positive for malaise/fatigue. Negative for chills, decreased appetite, diaphoresis, fever, night sweats, weight gain and weight loss.   HENT:  Negative for hoarse voice.    Eyes:  Negative for blurred vision, double vision and visual disturbance.   Cardiovascular:  Positive for dyspnea on exertion. Negative for chest pain, claudication, irregular heartbeat, leg swelling, near-syncope, orthopnea, palpitations, paroxysmal nocturnal dyspnea and syncope.   Respiratory:  Negative for cough, hemoptysis, shortness of breath, sputum production and wheezing.    Hematologic/Lymphatic: Negative for adenopathy and bleeding problem. Does not bruise/bleed easily.   Skin:  Negative for color change, nail changes, poor wound healing and rash.   Musculoskeletal:  Positive for back pain, joint pain and neck pain. Negative for falls and muscle cramps.   Gastrointestinal:  Negative for abdominal pain, dysphagia and heartburn.   Genitourinary:  Negative for flank pain.   Neurological:  Negative for brief paralysis, disturbances in coordination, dizziness, focal weakness, headaches, light-headedness, loss of balance, numbness, paresthesias, sensory change, vertigo and weakness.   Psychiatric/Behavioral:  Negative for depression and suicidal ideas. The patient is not nervous/anxious.    Allergic/Immunologic: Negative for persistent infections.   Vitals:    08/22/23 1520   BP: 121/50   Pulse: 63   Temp: 97.8 °F (36.6 °C)   SpO2: 98%   Weight: 90.1 kg  (198 lb 9.6 oz)      Physical Exam  Vitals reviewed.   Constitutional:       General: He is not in acute distress.     Appearance: He is well-developed. He is not diaphoretic.      Comments:  male who appears stated age   HENT:      Head: Normocephalic and atraumatic.   Eyes:      General: No scleral icterus.     Conjunctiva/sclera: Conjunctivae normal.   Neck:      Vascular: No JVD.      Trachea: No tracheal deviation.   Cardiovascular:      Rate and Rhythm: Normal rate and regular rhythm.      Heart sounds: Murmur heard.     No friction rub. No gallop.      Comments: III/VI systolic ejection murmur radiating to the carotid arteries.    Pulmonary:      Effort: Pulmonary effort is normal. No respiratory distress.      Breath sounds: Normal breath sounds. No wheezing or rales.   Abdominal:      General: There is no distension.      Palpations: Abdomen is soft. There is no mass.      Tenderness: There is no abdominal tenderness. There is no guarding or rebound.   Musculoskeletal:         General: Normal range of motion.      Cervical back: Neck supple.   Skin:     General: Skin is warm and dry.      Findings: No erythema or rash.   Neurological:      Mental Status: He is alert and oriented to person, place, and time.   Psychiatric:         Behavior: Behavior normal.         Thought Content: Thought content normal.         Judgment: Judgment normal.       The ROS, past medical history, surgical history, family history, social history, and vitals were reviewed by myself and corrected as needed.      Labs/Imaging:  -Transesophageal echocardiogram performed 8/10/2023, personally reviewed, demonstrates EF 60%, moderate aortic regurgitation, severe aortic valve stenosis with a peak velocity of 429 cm/s, max gradient 74 mmHg and mean gradient of 38 mmHg.  Mild mitral stenosis, mild mitral regurgitation and trace tricuspid regurgitation are present.  -Cardiac catheterization performed 8/10/2023, personally reviewed,  demonstrates normal coronary arteries.  -CT TAVR protocol performed 8/22/2023, personally reviewed, demonstrates calcification of the aortic valve.  Filling defects are present in the right common, external and superficial femoral arteries with no significant stenosis.      Assessment/Plan:   68-year-old  male with a history of hypertension, hyperlipidemia, nonsustained ventricular tachycardia and active tobacco abuse who presents with fatigue and shortness of breath in the setting of newly diagnosed aortic valve stenosis.  I discussed with the patient the importance of smoking cessation.  We discussed options including surgical aortic valve replacement versus transcatheter aortic valve replacement.  The patient wished to proceed with transcatheter aortic valve replacement.  The risks and benefits of the procedure were discussed with the patient including pain, bleeding, infection, stroke, renal dysfunction/failure, heart block requiring pacemaker, myocardial infarction and death.  The patient understood these risks and wished to proceed with transcatheter aortic valve replacement.  Carotid duplex will be obtained to rule out significant stenosis given his radiating murmur to the carotid arteries.      Patient Active Problem List   Diagnosis    Nonsustained ventricular tachycardia    Essential hypertension    Bilateral carotid bruits    Tobacco abuse    Aortic valve disease    Hyperlipidemia LDL goal <100    Ventricular tachycardia (paroxysmal)    Aortic stenosis, severe

## 2023-08-22 NOTE — PROGRESS NOTES
08/22/2023  Patient Information  Donn Robbins                                                                                          [address]  [place] [address]   1955  'PCP/Referring Physician'  Alonso Lakhani PA  [phone]  Melissa Espana APRN  [phone]  Chief Complaint   Patient presents with    Cardiac Valve Problem     Referred by PHILIP García for severe aortic stenosis/ TAVR evaluation. Patient is easily fatigued and has shortness of breath with exertion.       History of Present Illness: 68-year-old  male with a history of hypertension, hyperlipidemia, nonsustained ventricular tachycardia and active tobacco abuse who presents with fatigue and shortness of breath in the setting of newly diagnosed aortic valve stenosis.  Over the past year, the patient notes fatigue and shortness of breath.  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  He denies chest pain, dizziness or syncope.      Patient Active Problem List   Diagnosis    Nonsustained ventricular tachycardia    Essential hypertension    Bilateral carotid bruits    Tobacco abuse    Aortic valve disease    Hyperlipidemia LDL goal <100    Ventricular tachycardia (paroxysmal)    Aortic stenosis, severe     Past Medical History:   Diagnosis Date    Aortic stenosis, severe 08/02/2023    Arthritis     Gastric ulcer     High cholesterol     Hypertension     Kidney stones     Lumbar herniated disc      Past Surgical History:   Procedure Laterality Date    CARDIAC CATHETERIZATION N/A 8/14/2017    Procedure: Left Heart Cath;  Surgeon: Emil Nagel MD;  Location:  [facility];  Service:     CARDIAC CATHETERIZATION N/A 8/10/2023    Procedure: Left Heart Cath;  Surgeon: Andrey Gibbs MD;  Location:  [facility];  Service: Cardiology;  Laterality: N/A;    CARDIAC ELECTROPHYSIOLOGY PROCEDURE N/A 12/4/2017    Procedure: EPS with epi  wil;  Surgeon: Oscar Rowe MD;  Location: [facility] INVASIVE LOCATION;  Service:     COLONOSCOPY         Current Outpatient Medications:     albuterol sulfate  (90 Base) MCG/ACT inhaler, Inhale See Admin Instructions. Inhale 2 puffs by mouth every 4 to 6 hours as needed, Disp: , Rfl:     amLODIPine (NORVASC) 5 MG tablet, Take 1 tablet by mouth Daily. for blood pressure, Disp: , Rfl:     aspirin 81 MG EC tablet, Take 1 tablet by mouth Daily., Disp: , Rfl:     atorvastatin (LIPITOR) 20 MG tablet, Take 1 tablet by mouth Daily., Disp: , Rfl:     cloNIDine (CATAPRES) 0.1 MG tablet, Take 1 tablet by mouth Every 6 (Six) Hours As Needed., Disp: , Rfl:     lisinopril (PRINIVIL,ZESTRIL) 30 MG tablet, 1 tablet 2 (Two) Times a Day., Disp: , Rfl:     metoprolol succinate XL (TOPROL-XL) 50 MG 24 hr tablet, Take 1 tablet by mouth Daily. for blood pressure, Disp: , Rfl: 5    oxyCODONE-acetaminophen (PERCOCET)  MG per tablet, Take 1 tablet by mouth Every 12 (Twelve) Hours As Needed., Disp: , Rfl:     pramipexole (MIRAPEX) 0.5 MG tablet, Take 1 tablet by mouth Daily., Disp: , Rfl:   No current facility-administered medications for this visit.    Facility-Administered Medications Ordered in Other Visits:     lidocaine PF 1% (XYLOCAINE) injection 0.5 mL, 0.5 mL, Intradermal, Once PRN, Sean De La Rosa MD    metoprolol tartrate (LOPRESSOR) injection 5 mg, 5 mg, Intravenous, Q5 Min PRN, Sean De La Rosa MD    metoprolol tartrate (LOPRESSOR) tablet 100 mg, 100 mg, Oral, Once PRN, Sean De La Rosa MD    metoprolol tartrate (LOPRESSOR) tablet 50 mg, 50 mg, Oral, Once PRN, Sean De La Rosa MD    sodium chloride 0.9 % flush 10 mL, 10 mL, Intravenous, PRN, eSan De La Rosa MD  No Known Allergies  Social History     Socioeconomic History    Marital status: Single    Number of children: 2   Tobacco Use    Smoking status: Every Day     Packs/day: 1.00     Years: 42.00     Pack years: 42.00     Types: Cigarettes     Smokeless tobacco: Never   Vaping Use    Vaping Use: Never used   Substance and Sexual Activity    Alcohol use: Not Currently     Alcohol/week: 7.0 standard drinks     Types: 7 Glasses of wine per week     Comment: 4-5 BEERS PER DAY; 8/10/23--STATES DRINKS 1 GLASS OF WINE PER NIGHT    Drug use: No    Sexual activity: Defer     Family History   Problem Relation Age of Onset    Diabetes type II Mother     Heart failure Father     No Known Problems Sister      Review of Systems   Constitutional: Positive for malaise/fatigue. Negative for chills, decreased appetite, diaphoresis, fever, night sweats, weight gain and weight loss.   HENT:  Negative for hoarse voice.    Eyes:  Negative for blurred vision, double vision and visual disturbance.   Cardiovascular:  Positive for dyspnea on exertion. Negative for chest pain, claudication, irregular heartbeat, leg swelling, near-syncope, orthopnea, palpitations, paroxysmal nocturnal dyspnea and syncope.   Respiratory:  Negative for cough, hemoptysis, shortness of breath, sputum production and wheezing.    Hematologic/Lymphatic: Negative for adenopathy and bleeding problem. Does not bruise/bleed easily.   Skin:  Negative for color change, nail changes, poor wound healing and rash.   Musculoskeletal:  Positive for back pain, joint pain and neck pain. Negative for falls and muscle cramps.   Gastrointestinal:  Negative for abdominal pain, dysphagia and heartburn.   Genitourinary:  Negative for flank pain.   Neurological:  Negative for brief paralysis, disturbances in coordination, dizziness, focal weakness, headaches, light-headedness, loss of balance, numbness, paresthesias, sensory change, vertigo and weakness.   Psychiatric/Behavioral:  Negative for depression and suicidal ideas. The patient is not nervous/anxious.    Allergic/Immunologic: Negative for persistent infections.   Vitals:    08/22/23 1520   BP: 121/50   Pulse: 63   Temp: 97.8 øF (36.6 øC)   SpO2: 98%   Weight: 90.1 kg  (198 lb 9.6 oz)      Physical Exam  Vitals reviewed.   Constitutional:       General: He is not in acute distress.     Appearance: He is well-developed. He is not diaphoretic.      Comments:  male who appears stated age   HENT:      Head: Normocephalic and atraumatic.   Eyes:      General: No scleral icterus.     Conjunctiva/sclera: Conjunctivae normal.   Neck:      Vascular: No JVD.      Trachea: No tracheal deviation.   Cardiovascular:      Rate and Rhythm: Normal rate and regular rhythm.      Heart sounds: Murmur heard.     No friction rub. No gallop.      Comments: III/VI systolic ejection murmur radiating to the carotid arteries.    Pulmonary:      Effort: Pulmonary effort is normal. No respiratory distress.      Breath sounds: Normal breath sounds. No wheezing or rales.   Abdominal:      General: There is no distension.      Palpations: Abdomen is soft. There is no mass.      Tenderness: There is no abdominal tenderness. There is no guarding or rebound.   Musculoskeletal:         General: Normal range of motion.      Cervical back: Neck supple.   Skin:     General: Skin is warm and dry.      Findings: No erythema or rash.   Neurological:      Mental Status: He is alert and oriented to person, place, and time.   Psychiatric:         Behavior: Behavior normal.         Thought Content: Thought content normal.         Judgment: Judgment normal.       The ROS, past medical history, surgical history, family history, social history, and vitals were reviewed by myself and corrected as needed.      Labs/Imaging:  -Transesophageal echocardiogram performed 8/10/2023, personally reviewed, demonstrates EF 60%, moderate aortic regurgitation, severe aortic valve stenosis with a peak velocity of 429 cm/s, max gradient 74 mmHg and mean gradient of 38 mmHg.  Mild mitral stenosis, mild mitral regurgitation and trace tricuspid regurgitation are present.  -Cardiac catheterization performed 8/10/2023, personally reviewed,  demonstrates normal coronary arteries.  -CT TAVR protocol performed 8/22/2023, personally reviewed, demonstrates calcification of the aortic valve.  Filling defects are present in the right common, external and superficial femoral arteries with no significant stenosis.      Assessment/Plan:   68-year-old  male with a history of hypertension, hyperlipidemia, nonsustained ventricular tachycardia and active tobacco abuse who presents with fatigue and shortness of breath in the setting of newly diagnosed aortic valve stenosis.  I discussed with the patient the importance of smoking cessation.  We discussed options including surgical aortic valve replacement versus transcatheter aortic valve replacement.  The patient wished to proceed with transcatheter aortic valve replacement.  The risks and benefits of the procedure were discussed with the patient including pain, bleeding, infection, stroke, renal dysfunction/failure, heart block requiring pacemaker, myocardial infarction and death.  The patient understood these risks and wished to proceed with transcatheter aortic valve replacement.  Carotid duplex will be obtained to rule out significant stenosis given his radiating murmur to the carotid arteries.      Patient Active Problem List   Diagnosis    Nonsustained ventricular tachycardia    Essential hypertension    Bilateral carotid bruits    Tobacco abuse    Aortic valve disease    Hyperlipidemia LDL goal <100    Ventricular tachycardia (paroxysmal)    Aortic stenosis, severe

## 2023-08-23 DIAGNOSIS — R09.89 BILATERAL CAROTID BRUITS: Primary | ICD-10-CM

## 2023-08-23 DIAGNOSIS — I35.0 AORTIC STENOSIS, SEVERE: ICD-10-CM

## 2023-08-26 LAB — CREAT BLDA-MCNC: 1 MG/DL (ref 0.6–1.3)

## 2023-08-31 ENCOUNTER — PREP FOR SURGERY (OUTPATIENT)
Dept: OTHER | Facility: HOSPITAL | Age: 68
End: 2023-08-31
Payer: MEDICARE

## 2023-08-31 DIAGNOSIS — I35.0 AORTIC STENOSIS, SEVERE: Primary | ICD-10-CM

## 2023-09-01 RX ORDER — ASPIRIN 325 MG
325 TABLET ORAL NIGHTLY
OUTPATIENT
Start: 2023-09-08 | End: 2023-09-09

## 2023-09-01 RX ORDER — CHLORHEXIDINE GLUCONATE 500 MG/1
CLOTH TOPICAL EVERY 12 HOURS PRN
OUTPATIENT
Start: 2023-09-11

## 2023-09-01 RX ORDER — CHLORHEXIDINE GLUCONATE 0.12 MG/ML
15 RINSE ORAL ONCE
OUTPATIENT
Start: 2023-09-11 | End: 2023-09-11

## 2023-09-01 RX ORDER — CEFAZOLIN SODIUM 2 G/100ML
2 INJECTION, SOLUTION INTRAVENOUS ONCE
OUTPATIENT
Start: 2023-09-11 | End: 2023-09-11

## 2023-09-01 RX ORDER — NITROGLYCERIN 0.4 MG/1
0.4 TABLET SUBLINGUAL
OUTPATIENT
Start: 2023-09-11

## 2023-09-01 RX ORDER — CHLORHEXIDINE GLUCONATE 500 MG/1
1 CLOTH TOPICAL EVERY 12 HOURS PRN
OUTPATIENT
Start: 2023-09-08

## 2023-09-08 ENCOUNTER — HOSPITAL ENCOUNTER (OUTPATIENT)
Dept: CARDIOLOGY | Facility: HOSPITAL | Age: 68
Discharge: HOME | End: 2023-09-08
Payer: MEDICARE

## 2023-09-08 ENCOUNTER — HOSPITAL ENCOUNTER (OUTPATIENT)
Dept: GENERAL RADIOLOGY | Facility: HOSPITAL | Age: 68
Discharge: HOME | End: 2023-09-08
Payer: MEDICARE

## 2023-09-08 ENCOUNTER — PRE-ADMISSION TESTING (OUTPATIENT)
Dept: PREADMISSION TESTING | Facility: HOSPITAL | Age: 68
DRG: 267 | End: 2023-09-08
Payer: MEDICARE

## 2023-09-08 ENCOUNTER — HOSPITAL ENCOUNTER (OUTPATIENT)
Dept: PULMONOLOGY | Facility: HOSPITAL | Age: 68
Discharge: HOME | End: 2023-09-08
Payer: MEDICARE

## 2023-09-08 VITALS — OXYGEN SATURATION: 97 % | BODY MASS INDEX: 29.64 KG/M2 | WEIGHT: 195.55 LBS | HEIGHT: 68 IN

## 2023-09-08 DIAGNOSIS — I35.0 AORTIC STENOSIS, SEVERE: ICD-10-CM

## 2023-09-08 DIAGNOSIS — R09.89 BILATERAL CAROTID BRUITS: ICD-10-CM

## 2023-09-08 LAB
ABO GROUP BLD: NORMAL
ALBUMIN SERPL-MCNC: 4.6 G/DL (ref 3.5–5.2)
ALBUMIN/GLOB SERPL: 1.5 G/DL
ALP SERPL-CCNC: 108 U/L (ref 39–117)
ALT SERPL W P-5'-P-CCNC: 18 U/L (ref 1–41)
AMPHET+METHAMPHET UR QL: NEGATIVE
AMPHETAMINES UR QL: NEGATIVE
ANION GAP SERPL CALCULATED.3IONS-SCNC: 9 MMOL/L (ref 5–15)
APTT PPP: 30.2 SECONDS (ref 22–39)
AST SERPL-CCNC: 31 U/L (ref 1–40)
BARBITURATES UR QL SCN: NEGATIVE
BASOPHILS # BLD AUTO: 0.08 10*3/MM3 (ref 0–0.2)
BASOPHILS NFR BLD AUTO: 0.7 % (ref 0–1.5)
BENZODIAZ UR QL SCN: NEGATIVE
BH CV XLRA MEAS LEFT DIST CCA EDV: 17.7 CM/SEC
BH CV XLRA MEAS LEFT DIST CCA PSV: 82.5 CM/SEC
BH CV XLRA MEAS LEFT DIST ICA EDV: 21.6 CM/SEC
BH CV XLRA MEAS LEFT DIST ICA PSV: 99.7 CM/SEC
BH CV XLRA MEAS LEFT ICA/CCA RATIO: 1.1
BH CV XLRA MEAS LEFT MID CCA EDV: 16.7 CM/SEC
BH CV XLRA MEAS LEFT MID CCA PSV: 110 CM/SEC
BH CV XLRA MEAS LEFT MID ICA EDV: 21.6 CM/SEC
BH CV XLRA MEAS LEFT MID ICA PSV: 90.4 CM/SEC
BH CV XLRA MEAS LEFT PROX CCA EDV: 17.7 CM/SEC
BH CV XLRA MEAS LEFT PROX CCA PSV: 121.8 CM/SEC
BH CV XLRA MEAS LEFT PROX ECA PSV: 93.3 CM/SEC
BH CV XLRA MEAS LEFT PROX ICA EDV: 15.2 CM/SEC
BH CV XLRA MEAS LEFT PROX ICA PSV: 76.1 CM/SEC
BH CV XLRA MEAS LEFT PROX SCLA PSV: 117.3 CM/SEC
BH CV XLRA MEAS LEFT VERTEBRAL A EDV: 11.3 CM/SEC
BH CV XLRA MEAS LEFT VERTEBRAL A PSV: 66.3 CM/SEC
BH CV XLRA MEAS RIGHT DIST CCA EDV: 14.1 CM/SEC
BH CV XLRA MEAS RIGHT DIST CCA PSV: 82.5 CM/SEC
BH CV XLRA MEAS RIGHT DIST ICA EDV: 27 CM/SEC
BH CV XLRA MEAS RIGHT DIST ICA PSV: 95.8 CM/SEC
BH CV XLRA MEAS RIGHT ICA/CCA RATIO: 0.77
BH CV XLRA MEAS RIGHT MID CCA EDV: 16.5 CM/SEC
BH CV XLRA MEAS RIGHT MID CCA PSV: 82.5 CM/SEC
BH CV XLRA MEAS RIGHT MID ICA EDV: 21.1 CM/SEC
BH CV XLRA MEAS RIGHT MID ICA PSV: 74.6 CM/SEC
BH CV XLRA MEAS RIGHT PROX CCA PSV: 18.9 CM/SEC
BH CV XLRA MEAS RIGHT PROX ECA PSV: 149 CM/SEC
BH CV XLRA MEAS RIGHT PROX ICA EDV: 17.7 CM/SEC
BH CV XLRA MEAS RIGHT PROX ICA PSV: 57.9 CM/SEC
BH CV XLRA MEAS RIGHT PROX SCLA PSV: 199 CM/SEC
BH CV XLRA MEAS RIGHT VERTEBRAL A EDV: 7.9 CM/SEC
BH CV XLRA MEAS RIGHT VERTEBRAL A PSV: 56.2 CM/SEC
BILIRUB SERPL-MCNC: 0.4 MG/DL (ref 0–1.2)
BLD GP AB SCN SERPL QL: NEGATIVE
BUN SERPL-MCNC: 29 MG/DL (ref 8–23)
BUN/CREAT SERPL: 26.4 (ref 7–25)
BUPRENORPHINE SERPL-MCNC: NEGATIVE NG/ML
CALCIUM SPEC-SCNC: 9.3 MG/DL (ref 8.6–10.5)
CANNABINOIDS SERPL QL: POSITIVE
CHLORIDE SERPL-SCNC: 105 MMOL/L (ref 98–107)
CO2 SERPL-SCNC: 26 MMOL/L (ref 22–29)
COCAINE UR QL: NEGATIVE
CREAT SERPL-MCNC: 1.1 MG/DL (ref 0.76–1.27)
DEPRECATED RDW RBC AUTO: 46.5 FL (ref 37–54)
EGFRCR SERPLBLD CKD-EPI 2021: 74 ML/MIN/1.73
EOSINOPHIL # BLD AUTO: 0.35 10*3/MM3 (ref 0–0.4)
EOSINOPHIL NFR BLD AUTO: 3 % (ref 0.3–6.2)
ERYTHROCYTE [DISTWIDTH] IN BLOOD BY AUTOMATED COUNT: 13.3 % (ref 12.3–15.4)
FENTANYL UR-MCNC: NEGATIVE NG/ML
GLOBULIN UR ELPH-MCNC: 3.1 GM/DL
GLUCOSE SERPL-MCNC: 97 MG/DL (ref 65–99)
HBA1C MFR BLD: 5.2 % (ref 4.8–5.6)
HCT VFR BLD AUTO: 45.6 % (ref 37.5–51)
HGB BLD-MCNC: 15.1 G/DL (ref 13–17.7)
IMM GRANULOCYTES # BLD AUTO: 0.06 10*3/MM3 (ref 0–0.05)
IMM GRANULOCYTES NFR BLD AUTO: 0.5 % (ref 0–0.5)
INR PPP: 1.01 (ref 0.89–1.12)
LEFT ARM BP: NORMAL MMHG
LYMPHOCYTES # BLD AUTO: 2.32 10*3/MM3 (ref 0.7–3.1)
LYMPHOCYTES NFR BLD AUTO: 19.7 % (ref 19.6–45.3)
MAGNESIUM SERPL-MCNC: 2.5 MG/DL (ref 1.6–2.4)
MCH RBC QN AUTO: 31.4 PG (ref 26.6–33)
MCHC RBC AUTO-ENTMCNC: 33.1 G/DL (ref 31.5–35.7)
MCV RBC AUTO: 94.8 FL (ref 79–97)
METHADONE UR QL SCN: NEGATIVE
MONOCYTES # BLD AUTO: 1.09 10*3/MM3 (ref 0.1–0.9)
MONOCYTES NFR BLD AUTO: 9.2 % (ref 5–12)
NEUTROPHILS NFR BLD AUTO: 66.9 % (ref 42.7–76)
NEUTROPHILS NFR BLD AUTO: 7.89 10*3/MM3 (ref 1.7–7)
NRBC BLD AUTO-RTO: 0 /100 WBC (ref 0–0.2)
OPIATES UR QL: NEGATIVE
OXYCODONE UR QL SCN: POSITIVE
PA ADP PRP-ACNC: 154 PRU
PCP UR QL SCN: NEGATIVE
PLATELET # BLD AUTO: 85 10*3/MM3 (ref 140–450)
POTASSIUM SERPL-SCNC: 4.3 MMOL/L (ref 3.5–5.2)
PROPOXYPH UR QL: NEGATIVE
PROT SERPL-MCNC: 7.7 G/DL (ref 6–8.5)
PROTHROMBIN TIME: 13.4 SECONDS (ref 12.2–14.5)
RBC # BLD AUTO: 4.81 10*6/MM3 (ref 4.14–5.8)
RH BLD: POSITIVE
RIGHT ARM BP: NORMAL MMHG
SODIUM SERPL-SCNC: 140 MMOL/L (ref 136–145)
T&S EXPIRATION DATE: NORMAL
TRICYCLICS UR QL SCN: NEGATIVE
WBC NRBC COR # BLD: 11.79 10*3/MM3 (ref 3.4–10.8)

## 2023-09-08 PROCEDURE — 86901 BLOOD TYPING SEROLOGIC RH(D): CPT

## 2023-09-08 PROCEDURE — 86850 RBC ANTIBODY SCREEN: CPT

## 2023-09-08 PROCEDURE — 36415 COLL VENOUS BLD VENIPUNCTURE: CPT

## 2023-09-08 PROCEDURE — 93880 EXTRACRANIAL BILAT STUDY: CPT

## 2023-09-08 PROCEDURE — 83036 HEMOGLOBIN GLYCOSYLATED A1C: CPT

## 2023-09-08 PROCEDURE — 83735 ASSAY OF MAGNESIUM: CPT

## 2023-09-08 PROCEDURE — 85576 BLOOD PLATELET AGGREGATION: CPT

## 2023-09-08 PROCEDURE — 85610 PROTHROMBIN TIME: CPT

## 2023-09-08 PROCEDURE — 80053 COMPREHEN METABOLIC PANEL: CPT

## 2023-09-08 PROCEDURE — 94010 BREATHING CAPACITY TEST: CPT

## 2023-09-08 PROCEDURE — 86900 BLOOD TYPING SEROLOGIC ABO: CPT

## 2023-09-08 PROCEDURE — 85730 THROMBOPLASTIN TIME PARTIAL: CPT

## 2023-09-08 PROCEDURE — 80307 DRUG TEST PRSMV CHEM ANLYZR: CPT

## 2023-09-08 PROCEDURE — 86923 COMPATIBILITY TEST ELECTRIC: CPT

## 2023-09-08 PROCEDURE — 71046 X-RAY EXAM CHEST 2 VIEWS: CPT

## 2023-09-08 PROCEDURE — 85025 COMPLETE CBC W/AUTO DIFF WBC: CPT

## 2023-09-08 NOTE — PAT
Patient to apply Chlorhexadine wipes  to surgical area (as instructed) the night before procedure and the AM of procedure. Wipes provided.     Patient instructed to drink 20 ounces of Gatorade and it needs to be completed 1 hour (for Main OR patients) or 2 hours (scheduled  section & [facility] patients) before given arrival time for procedure (NO RED Gatorade)    Patient verbalized understanding.     Blood bank bracelet applied to patient during Pre Admission Testing visit.  Patient instructed not to remove from arm until after procedure and they are discharged from the hospital.  Explained to patient that they may shower and get the bracelet wet, but not to immerse under water for longer periods (bathing, swimming, hand dishwashing, etc).  Patient verbalized understanding.     Instructed patient to take 325 mg of Asprin (or four tablets of the 81 mg strength) the night before heart surgery as per CT surgeon's order.  Patient and/or family verbalized understanding.     Bactroban to be applied to each nostril during PAT visit if surgery the following day.  If surgery NOT the following day, then Bactroban supplied to patient with instructions both written and verbally to insert into each nares the night before surgery.     Per Anesthesia Request, patient instructed not to take their ACE/ARB medications on the AM of surgery.     Patient directed to Radiology Department for CXR after Pre Admission Testing Appointment.      Patient directed to Respiratory Department after Pre Admission Testing visit for Pulmonary Function Test.     Patient viewed general PAT education video as instructed in their preoperative information received from their surgeon.  Patient stated the general PAT education video was viewed in its entirety and survey completed.  Copies of PAT general education handouts (Incentive Spirometry, Meds to Beds Program, Patient Belongings, Pre-op skin preparation instructions, Blood Glucose testing,  Visitor policy, Surgery FAQ, Code H) distributed to patient if not printed. Education related to the PAT pass and skin preparation for surgery (if applicable) completed in PAT as a reinforcement to PAT education video. Patient instructed to return PAT pass provided today as well as completed skin preparation sheet (if applicable) on the day of procedure.     Additionally if patient had not viewed video yet but intended to view it at home or in our waiting area, then referred them to the handout with QR code/link provided during PAT visit.  Instructed patient to complete survey after viewing the video in its entirety.  Encouraged patient/family to read PAT general education handouts thoroughly and notify PAT staff with any questions or concerns. Patient verbalized understanding of all information and priority content.

## 2023-09-10 ENCOUNTER — ANESTHESIA EVENT (OUTPATIENT)
Dept: PERIOP | Facility: HOSPITAL | Age: 68
DRG: 267 | End: 2023-09-10
Payer: MEDICARE

## 2023-09-10 RX ORDER — SODIUM CHLORIDE 9 MG/ML
40 INJECTION, SOLUTION INTRAVENOUS AS NEEDED
Status: CANCELLED | OUTPATIENT
Start: 2023-09-10

## 2023-09-10 RX ORDER — SODIUM CHLORIDE 0.9 % (FLUSH) 0.9 %
10 SYRINGE (ML) INJECTION EVERY 12 HOURS SCHEDULED
Status: CANCELLED | OUTPATIENT
Start: 2023-09-10

## 2023-09-10 RX ORDER — SODIUM CHLORIDE 0.9 % (FLUSH) 0.9 %
10 SYRINGE (ML) INJECTION AS NEEDED
Status: CANCELLED | OUTPATIENT
Start: 2023-09-10

## 2023-09-10 RX ORDER — FAMOTIDINE 10 MG/ML
20 INJECTION, SOLUTION INTRAVENOUS ONCE
Status: CANCELLED | OUTPATIENT
Start: 2023-09-10 | End: 2023-09-10

## 2023-09-11 ENCOUNTER — ANESTHESIA (OUTPATIENT)
Dept: PERIOP | Facility: HOSPITAL | Age: 68
DRG: 267 | End: 2023-09-11
Payer: MEDICARE

## 2023-09-11 ENCOUNTER — ANCILLARY PROCEDURE (OUTPATIENT)
Dept: PERIOP | Facility: HOSPITAL | Age: 68
DRG: 267 | End: 2023-09-11
Payer: MEDICARE

## 2023-09-11 ENCOUNTER — HOSPITAL ENCOUNTER (INPATIENT)
Facility: HOSPITAL | Age: 68
LOS: 1 days | Discharge: HOME | DRG: 267 | End: 2023-09-12
Attending: THORACIC SURGERY (CARDIOTHORACIC VASCULAR SURGERY) | Admitting: THORACIC SURGERY (CARDIOTHORACIC VASCULAR SURGERY)
Payer: MEDICARE

## 2023-09-11 ENCOUNTER — ANESTHESIA EVENT CONVERTED (OUTPATIENT)
Dept: ANESTHESIOLOGY | Facility: HOSPITAL | Age: 68
DRG: 267 | End: 2023-09-11
Payer: MEDICARE

## 2023-09-11 DIAGNOSIS — I35.9 AORTIC VALVE DISEASE: ICD-10-CM

## 2023-09-11 DIAGNOSIS — I35.0 AORTIC STENOSIS, SEVERE: Primary | ICD-10-CM

## 2023-09-11 DIAGNOSIS — D69.6 THROMBOCYTOPENIA: ICD-10-CM

## 2023-09-11 DIAGNOSIS — I35.0 AORTIC STENOSIS, SEVERE: ICD-10-CM

## 2023-09-11 LAB
ABO GROUP BLD: NORMAL
ACT BLD: 137 SECONDS (ref 82–152)
ACT BLD: 143 SECONDS (ref 82–152)
ACT BLD: 347 SECONDS (ref 82–152)
ANION GAP SERPL CALCULATED.3IONS-SCNC: 11 MMOL/L (ref 5–15)
APTT PPP: 37.2 SECONDS (ref 22–39)
BASE EXCESS BLDA CALC-SCNC: -4 MMOL/L (ref -5–5)
BUN SERPL-MCNC: 24 MG/DL (ref 8–23)
BUN/CREAT SERPL: 21.8 (ref 7–25)
CA-I BLDA-SCNC: 1.16 MMOL/L (ref 1.2–1.32)
CALCIUM SPEC-SCNC: 8 MG/DL (ref 8.6–10.5)
CHLORIDE SERPL-SCNC: 101 MMOL/L (ref 98–107)
CO2 BLDA-SCNC: 22 MMOL/L (ref 24–29)
CO2 SERPL-SCNC: 20 MMOL/L (ref 22–29)
CREAT SERPL-MCNC: 1.1 MG/DL (ref 0.76–1.27)
DEPRECATED RDW RBC AUTO: 46 FL (ref 37–54)
EGFRCR SERPLBLD CKD-EPI 2021: 74 ML/MIN/1.73
ERYTHROCYTE [DISTWIDTH] IN BLOOD BY AUTOMATED COUNT: 13.1 % (ref 12.3–15.4)
GLUCOSE BLDC GLUCOMTR-MCNC: 100 MG/DL (ref 70–130)
GLUCOSE BLDC GLUCOMTR-MCNC: 101 MG/DL (ref 70–130)
GLUCOSE BLDC GLUCOMTR-MCNC: 92 MG/DL (ref 70–130)
GLUCOSE SERPL-MCNC: 101 MG/DL (ref 65–99)
HCO3 BLDA-SCNC: 21 MMOL/L (ref 22–26)
HCT VFR BLD AUTO: 44.5 % (ref 37.5–51)
HCT VFR BLDA CALC: 41 % (ref 38–51)
HGB BLD-MCNC: 14.8 G/DL (ref 13–17.7)
HGB BLDA-MCNC: 13.9 G/DL (ref 12–17)
MCH RBC QN AUTO: 31.6 PG (ref 26.6–33)
MCHC RBC AUTO-ENTMCNC: 33.3 G/DL (ref 31.5–35.7)
MCV RBC AUTO: 95.1 FL (ref 79–97)
PCO2 BLDA: 36.2 MM HG (ref 35–45)
PH BLDA: 7.37 PH UNITS (ref 7.35–7.6)
PLATELET # BLD AUTO: 72 10*3/MM3 (ref 140–450)
PO2 BLDA: 78 MMHG (ref 80–105)
POTASSIUM BLDA-SCNC: 4.6 MMOL/L (ref 3.5–4.9)
POTASSIUM SERPL-SCNC: 4.8 MMOL/L (ref 3.5–5.2)
QT INTERVAL: 458 MS
QTC INTERVAL: 445 MS
RBC # BLD AUTO: 4.68 10*6/MM3 (ref 4.14–5.8)
RH BLD: POSITIVE
SAO2 % BLDA: 95 % (ref 95–98)
SODIUM BLD-SCNC: 137 MMOL/L (ref 138–146)
SODIUM SERPL-SCNC: 132 MMOL/L (ref 136–145)
WBC NRBC COR # BLD: 9.63 10*3/MM3 (ref 3.4–10.8)

## 2023-09-11 PROCEDURE — 85014 HEMATOCRIT: CPT

## 2023-09-11 PROCEDURE — 85730 THROMBOPLASTIN TIME PARTIAL: CPT | Performed by: THORACIC SURGERY (CARDIOTHORACIC VASCULAR SURGERY)

## 2023-09-11 PROCEDURE — 33361 REPLACE AORTIC VALVE PERQ: CPT | Performed by: INTERNAL MEDICINE

## 2023-09-11 PROCEDURE — 25010000002 HEPARIN (PORCINE) PER 1000 UNITS: Performed by: NURSE ANESTHETIST, CERTIFIED REGISTERED

## 2023-09-11 PROCEDURE — 82947 ASSAY GLUCOSE BLOOD QUANT: CPT

## 2023-09-11 PROCEDURE — 33361 REPLACE AORTIC VALVE PERQ: CPT | Performed by: THORACIC SURGERY (CARDIOTHORACIC VASCULAR SURGERY)

## 2023-09-11 PROCEDURE — 25010000002 CEFAZOLIN IN DEXTROSE 2-4 GM/100ML-% SOLUTION: Performed by: NURSE ANESTHETIST, CERTIFIED REGISTERED

## 2023-09-11 PROCEDURE — C1889 IMPLANT/INSERT DEVICE, NOC: HCPCS | Performed by: THORACIC SURGERY (CARDIOTHORACIC VASCULAR SURGERY)

## 2023-09-11 PROCEDURE — 25010000002 PROPOFOL 10 MG/ML EMULSION: Performed by: NURSE ANESTHETIST, CERTIFIED REGISTERED

## 2023-09-11 PROCEDURE — C1769 GUIDE WIRE: HCPCS | Performed by: THORACIC SURGERY (CARDIOTHORACIC VASCULAR SURGERY)

## 2023-09-11 PROCEDURE — 86901 BLOOD TYPING SEROLOGIC RH(D): CPT

## 2023-09-11 PROCEDURE — 99223 1ST HOSP IP/OBS HIGH 75: CPT | Performed by: INTERNAL MEDICINE

## 2023-09-11 PROCEDURE — 93005 ELECTROCARDIOGRAM TRACING: CPT | Performed by: PHYSICIAN ASSISTANT

## 2023-09-11 PROCEDURE — 25010000002 SUGAMMADEX 200 MG/2ML SOLUTION: Performed by: NURSE ANESTHETIST, CERTIFIED REGISTERED

## 2023-09-11 PROCEDURE — C1760 CLOSURE DEV, VASC: HCPCS | Performed by: THORACIC SURGERY (CARDIOTHORACIC VASCULAR SURGERY)

## 2023-09-11 PROCEDURE — 86900 BLOOD TYPING SEROLOGIC ABO: CPT

## 2023-09-11 PROCEDURE — B24BZZ4 ULTRASONOGRAPHY OF HEART WITH AORTA, TRANSESOPHAGEAL: ICD-10-PCS | Performed by: THORACIC SURGERY (CARDIOTHORACIC VASCULAR SURGERY)

## 2023-09-11 PROCEDURE — 82803 BLOOD GASES ANY COMBINATION: CPT

## 2023-09-11 PROCEDURE — 85347 COAGULATION TIME ACTIVATED: CPT

## 2023-09-11 PROCEDURE — 25010000002 ONDANSETRON PER 1 MG: Performed by: NURSE ANESTHETIST, CERTIFIED REGISTERED

## 2023-09-11 PROCEDURE — 85027 COMPLETE CBC AUTOMATED: CPT | Performed by: PHYSICIAN ASSISTANT

## 2023-09-11 PROCEDURE — C1894 INTRO/SHEATH, NON-LASER: HCPCS | Performed by: THORACIC SURGERY (CARDIOTHORACIC VASCULAR SURGERY)

## 2023-09-11 PROCEDURE — 25010000002 PHENYLEPHRINE 10 MG/ML SOLUTION 5 ML VIAL: Performed by: NURSE ANESTHETIST, CERTIFIED REGISTERED

## 2023-09-11 PROCEDURE — 02RF38Z REPLACEMENT OF AORTIC VALVE WITH ZOOPLASTIC TISSUE, PERCUTANEOUS APPROACH: ICD-10-PCS | Performed by: THORACIC SURGERY (CARDIOTHORACIC VASCULAR SURGERY)

## 2023-09-11 PROCEDURE — 82330 ASSAY OF CALCIUM: CPT

## 2023-09-11 PROCEDURE — 25010000002 MORPHINE PER 10 MG: Performed by: THORACIC SURGERY (CARDIOTHORACIC VASCULAR SURGERY)

## 2023-09-11 PROCEDURE — 84295 ASSAY OF SERUM SODIUM: CPT

## 2023-09-11 PROCEDURE — C1887 CATHETER, GUIDING: HCPCS | Performed by: THORACIC SURGERY (CARDIOTHORACIC VASCULAR SURGERY)

## 2023-09-11 PROCEDURE — 25010000002 FENTANYL CITRATE (PF) 100 MCG/2ML SOLUTION: Performed by: NURSE ANESTHETIST, CERTIFIED REGISTERED

## 2023-09-11 PROCEDURE — 25510000001 IOPAMIDOL PER 1 ML: Performed by: THORACIC SURGERY (CARDIOTHORACIC VASCULAR SURGERY)

## 2023-09-11 PROCEDURE — 25010000002 DEXAMETHASONE PER 1 MG: Performed by: NURSE ANESTHETIST, CERTIFIED REGISTERED

## 2023-09-11 PROCEDURE — 25010000002 PROTAMINE SULFATE PER 10 MG: Performed by: NURSE ANESTHETIST, CERTIFIED REGISTERED

## 2023-09-11 PROCEDURE — 25010000002 HEPARIN (PORCINE) PER 1000 UNITS: Performed by: THORACIC SURGERY (CARDIOTHORACIC VASCULAR SURGERY)

## 2023-09-11 PROCEDURE — 80048 BASIC METABOLIC PNL TOTAL CA: CPT | Performed by: PHYSICIAN ASSISTANT

## 2023-09-11 PROCEDURE — 84132 ASSAY OF SERUM POTASSIUM: CPT

## 2023-09-11 PROCEDURE — 93355 ECHO TRANSESOPHAGEAL (TEE): CPT

## 2023-09-11 DEVICE — VLV HEART TRNSCATH SAPIEN/COMMANDER 23MM: Type: IMPLANTABLE DEVICE | Site: AORTA | Status: FUNCTIONAL

## 2023-09-11 RX ORDER — ONDANSETRON 2 MG/ML
INJECTION INTRAMUSCULAR; INTRAVENOUS AS NEEDED
Status: DISCONTINUED | OUTPATIENT
Start: 2023-09-11 | End: 2023-09-11 | Stop reason: SURG

## 2023-09-11 RX ORDER — PRAMIPEXOLE DIHYDROCHLORIDE 0.25 MG/1
0.5 TABLET ORAL DAILY
Status: DISCONTINUED | OUTPATIENT
Start: 2023-09-11 | End: 2023-09-11

## 2023-09-11 RX ORDER — HEPARIN SODIUM 1000 [USP'U]/ML
INJECTION, SOLUTION INTRAVENOUS; SUBCUTANEOUS AS NEEDED
Status: DISCONTINUED | OUTPATIENT
Start: 2023-09-11 | End: 2023-09-11 | Stop reason: SURG

## 2023-09-11 RX ORDER — LABETALOL HYDROCHLORIDE 5 MG/ML
10 INJECTION, SOLUTION INTRAVENOUS
Status: DISCONTINUED | OUTPATIENT
Start: 2023-09-11 | End: 2023-09-12 | Stop reason: HOSPADM

## 2023-09-11 RX ORDER — SODIUM CHLORIDE 9 MG/ML
250 INJECTION, SOLUTION INTRAVENOUS ONCE AS NEEDED
Status: DISCONTINUED | OUTPATIENT
Start: 2023-09-11 | End: 2023-09-12 | Stop reason: HOSPADM

## 2023-09-11 RX ORDER — SODIUM CHLORIDE 9 MG/ML
INJECTION, SOLUTION INTRAVENOUS AS NEEDED
Status: DISCONTINUED | OUTPATIENT
Start: 2023-09-11 | End: 2023-09-11 | Stop reason: HOSPADM

## 2023-09-11 RX ORDER — CHLORHEXIDINE GLUCONATE 0.12 MG/ML
15 RINSE ORAL ONCE
Status: COMPLETED | OUTPATIENT
Start: 2023-09-11 | End: 2023-09-11

## 2023-09-11 RX ORDER — OXYCODONE AND ACETAMINOPHEN 10; 325 MG/1; MG/1
1 TABLET ORAL EVERY 6 HOURS PRN
Status: DISCONTINUED | OUTPATIENT
Start: 2023-09-11 | End: 2023-09-12 | Stop reason: HOSPADM

## 2023-09-11 RX ORDER — PROPOFOL 10 MG/ML
VIAL (ML) INTRAVENOUS AS NEEDED
Status: DISCONTINUED | OUTPATIENT
Start: 2023-09-11 | End: 2023-09-11 | Stop reason: SURG

## 2023-09-11 RX ORDER — ROCURONIUM BROMIDE 10 MG/ML
INJECTION, SOLUTION INTRAVENOUS AS NEEDED
Status: DISCONTINUED | OUTPATIENT
Start: 2023-09-11 | End: 2023-09-11 | Stop reason: SURG

## 2023-09-11 RX ORDER — ONDANSETRON 2 MG/ML
4 INJECTION INTRAMUSCULAR; INTRAVENOUS ONCE AS NEEDED
Status: DISCONTINUED | OUTPATIENT
Start: 2023-09-11 | End: 2023-09-12 | Stop reason: HOSPADM

## 2023-09-11 RX ORDER — MIDAZOLAM HYDROCHLORIDE 1 MG/ML
0.5 INJECTION INTRAMUSCULAR; INTRAVENOUS
Status: DISCONTINUED | OUTPATIENT
Start: 2023-09-11 | End: 2023-09-11 | Stop reason: HOSPADM

## 2023-09-11 RX ORDER — CEFAZOLIN SODIUM 2 G/100ML
2 INJECTION, SOLUTION INTRAVENOUS ONCE
Status: DISCONTINUED | OUTPATIENT
Start: 2023-09-11 | End: 2023-09-11 | Stop reason: HOSPADM

## 2023-09-11 RX ORDER — HYDRALAZINE HYDROCHLORIDE 20 MG/ML
10 INJECTION INTRAMUSCULAR; INTRAVENOUS EVERY 6 HOURS PRN
Status: DISCONTINUED | OUTPATIENT
Start: 2023-09-11 | End: 2023-09-12 | Stop reason: HOSPADM

## 2023-09-11 RX ORDER — NITROGLYCERIN 0.4 MG/1
0.4 TABLET SUBLINGUAL
Status: DISCONTINUED | OUTPATIENT
Start: 2023-09-11 | End: 2023-09-12 | Stop reason: HOSPADM

## 2023-09-11 RX ORDER — SODIUM CHLORIDE 9 MG/ML
INJECTION, SOLUTION INTRAVENOUS CONTINUOUS PRN
Status: DISCONTINUED | OUTPATIENT
Start: 2023-09-11 | End: 2023-09-11 | Stop reason: SURG

## 2023-09-11 RX ORDER — ACETAMINOPHEN 325 MG/1
650 TABLET ORAL EVERY 4 HOURS PRN
Status: DISCONTINUED | OUTPATIENT
Start: 2023-09-11 | End: 2023-09-12 | Stop reason: HOSPADM

## 2023-09-11 RX ORDER — ASPIRIN 81 MG/1
81 TABLET ORAL DAILY
Status: DISCONTINUED | OUTPATIENT
Start: 2023-09-12 | End: 2023-09-12 | Stop reason: HOSPADM

## 2023-09-11 RX ORDER — SODIUM CHLORIDE, SODIUM LACTATE, POTASSIUM CHLORIDE, CALCIUM CHLORIDE 600; 310; 30; 20 MG/100ML; MG/100ML; MG/100ML; MG/100ML
9 INJECTION, SOLUTION INTRAVENOUS CONTINUOUS
Status: DISCONTINUED | OUTPATIENT
Start: 2023-09-11 | End: 2023-09-12 | Stop reason: HOSPADM

## 2023-09-11 RX ORDER — ONDANSETRON 2 MG/ML
4 INJECTION INTRAMUSCULAR; INTRAVENOUS EVERY 6 HOURS PRN
Status: DISCONTINUED | OUTPATIENT
Start: 2023-09-11 | End: 2023-09-12 | Stop reason: HOSPADM

## 2023-09-11 RX ORDER — FENTANYL CITRATE 50 UG/ML
50 INJECTION, SOLUTION INTRAMUSCULAR; INTRAVENOUS
Status: DISCONTINUED | OUTPATIENT
Start: 2023-09-11 | End: 2023-09-12 | Stop reason: HOSPADM

## 2023-09-11 RX ORDER — LIDOCAINE HYDROCHLORIDE 10 MG/ML
0.5 INJECTION, SOLUTION EPIDURAL; INFILTRATION; INTRACAUDAL; PERINEURAL ONCE AS NEEDED
Status: COMPLETED | OUTPATIENT
Start: 2023-09-11 | End: 2023-09-11

## 2023-09-11 RX ORDER — CHLORHEXIDINE GLUCONATE 500 MG/1
CLOTH TOPICAL EVERY 12 HOURS PRN
Status: DISCONTINUED | OUTPATIENT
Start: 2023-09-11 | End: 2023-09-11 | Stop reason: HOSPADM

## 2023-09-11 RX ORDER — CHOLECALCIFEROL (VITAMIN D3) 125 MCG
5 CAPSULE ORAL NIGHTLY PRN
Status: DISCONTINUED | OUTPATIENT
Start: 2023-09-11 | End: 2023-09-12 | Stop reason: HOSPADM

## 2023-09-11 RX ORDER — SODIUM CHLORIDE 9 MG/ML
100 INJECTION, SOLUTION INTRAVENOUS CONTINUOUS
Status: ACTIVE | OUTPATIENT
Start: 2023-09-11 | End: 2023-09-11

## 2023-09-11 RX ORDER — CEFAZOLIN SODIUM 2 G/100ML
INJECTION, SOLUTION INTRAVENOUS AS NEEDED
Status: DISCONTINUED | OUTPATIENT
Start: 2023-09-11 | End: 2023-09-11 | Stop reason: SURG

## 2023-09-11 RX ORDER — NICARDIPINE HYDROCHLORIDE 2.5 MG/ML
INJECTION INTRAVENOUS AS NEEDED
Status: DISCONTINUED | OUTPATIENT
Start: 2023-09-11 | End: 2023-09-11 | Stop reason: SURG

## 2023-09-11 RX ORDER — FAMOTIDINE 20 MG/1
20 TABLET, FILM COATED ORAL ONCE
Status: COMPLETED | OUTPATIENT
Start: 2023-09-11 | End: 2023-09-11

## 2023-09-11 RX ORDER — ETOMIDATE 2 MG/ML
INJECTION INTRAVENOUS AS NEEDED
Status: DISCONTINUED | OUTPATIENT
Start: 2023-09-11 | End: 2023-09-11 | Stop reason: SURG

## 2023-09-11 RX ORDER — MORPHINE SULFATE 2 MG/ML
2 INJECTION, SOLUTION INTRAMUSCULAR; INTRAVENOUS ONCE AS NEEDED
Status: COMPLETED | OUTPATIENT
Start: 2023-09-11 | End: 2023-09-11

## 2023-09-11 RX ORDER — PRAMIPEXOLE DIHYDROCHLORIDE 0.25 MG/1
0.5 TABLET ORAL DAILY
Status: DISCONTINUED | OUTPATIENT
Start: 2023-09-11 | End: 2023-09-12 | Stop reason: HOSPADM

## 2023-09-11 RX ORDER — PROTAMINE SULFATE 10 MG/ML
INJECTION, SOLUTION INTRAVENOUS AS NEEDED
Status: DISCONTINUED | OUTPATIENT
Start: 2023-09-11 | End: 2023-09-11 | Stop reason: SURG

## 2023-09-11 RX ORDER — NITROGLYCERIN 0.4 MG/1
0.4 TABLET SUBLINGUAL
Status: DISCONTINUED | OUTPATIENT
Start: 2023-09-11 | End: 2023-09-11 | Stop reason: HOSPADM

## 2023-09-11 RX ORDER — FENTANYL CITRATE 50 UG/ML
INJECTION, SOLUTION INTRAMUSCULAR; INTRAVENOUS AS NEEDED
Status: DISCONTINUED | OUTPATIENT
Start: 2023-09-11 | End: 2023-09-11 | Stop reason: SURG

## 2023-09-11 RX ORDER — LIDOCAINE HYDROCHLORIDE 10 MG/ML
INJECTION, SOLUTION EPIDURAL; INFILTRATION; INTRACAUDAL; PERINEURAL AS NEEDED
Status: DISCONTINUED | OUTPATIENT
Start: 2023-09-11 | End: 2023-09-11 | Stop reason: SURG

## 2023-09-11 RX ORDER — CHOLECALCIFEROL (VITAMIN D3) 125 MCG
CAPSULE ORAL
Status: ACTIVE
Start: 2023-09-11 | End: 2023-09-12

## 2023-09-11 RX ORDER — BUPIVACAINE HCL/0.9 % NACL/PF 0.125 %
PLASTIC BAG, INJECTION (ML) EPIDURAL AS NEEDED
Status: DISCONTINUED | OUTPATIENT
Start: 2023-09-11 | End: 2023-09-11 | Stop reason: SURG

## 2023-09-11 RX ORDER — ONDANSETRON 4 MG/1
4 TABLET, FILM COATED ORAL EVERY 6 HOURS PRN
Status: DISCONTINUED | OUTPATIENT
Start: 2023-09-11 | End: 2023-09-12 | Stop reason: HOSPADM

## 2023-09-11 RX ORDER — DEXAMETHASONE SODIUM PHOSPHATE 4 MG/ML
INJECTION, SOLUTION INTRA-ARTICULAR; INTRALESIONAL; INTRAMUSCULAR; INTRAVENOUS; SOFT TISSUE AS NEEDED
Status: DISCONTINUED | OUTPATIENT
Start: 2023-09-11 | End: 2023-09-11 | Stop reason: SURG

## 2023-09-11 RX ORDER — HYDROMORPHONE HYDROCHLORIDE 1 MG/ML
0.5 INJECTION, SOLUTION INTRAMUSCULAR; INTRAVENOUS; SUBCUTANEOUS
Status: DISCONTINUED | OUTPATIENT
Start: 2023-09-11 | End: 2023-09-12 | Stop reason: HOSPADM

## 2023-09-11 RX ADMIN — ROCURONIUM BROMIDE 50 MG: 10 SOLUTION INTRAVENOUS at 07:04

## 2023-09-11 RX ADMIN — SODIUM CHLORIDE 15 MG/HR: 9 INJECTION, SOLUTION INTRAVENOUS at 07:42

## 2023-09-11 RX ADMIN — OXYCODONE HYDROCHLORIDE AND ACETAMINOPHEN 1 TABLET: 10; 325 TABLET ORAL at 11:29

## 2023-09-11 RX ADMIN — NICARDIPINE HYDROCHLORIDE 0.5 MG: 25 INJECTION, SOLUTION INTRAVENOUS at 07:42

## 2023-09-11 RX ADMIN — NICARDIPINE HYDROCHLORIDE 0.5 MG: 25 INJECTION, SOLUTION INTRAVENOUS at 07:40

## 2023-09-11 RX ADMIN — FENTANYL CITRATE 100 MCG: 50 INJECTION, SOLUTION INTRAMUSCULAR; INTRAVENOUS at 07:04

## 2023-09-11 RX ADMIN — PROPOFOL 50 MG: 10 INJECTION, EMULSION INTRAVENOUS at 07:04

## 2023-09-11 RX ADMIN — SODIUM CHLORIDE, POTASSIUM CHLORIDE, SODIUM LACTATE AND CALCIUM CHLORIDE 9 ML/HR: 600; 310; 30; 20 INJECTION, SOLUTION INTRAVENOUS at 05:58

## 2023-09-11 RX ADMIN — CHLORHEXIDINE GLUCONATE 15 ML: 1.2 SOLUTION ORAL at 05:58

## 2023-09-11 RX ADMIN — PHENYLEPHRINE HYDROCHLORIDE 44 MCG/MIN: 10 INJECTION INTRAVENOUS at 07:20

## 2023-09-11 RX ADMIN — PRAMIPEXOLE DIHYDROCHLORIDE 0.5 MG: 0.25 TABLET ORAL at 20:53

## 2023-09-11 RX ADMIN — DEXAMETHASONE SODIUM PHOSPHATE 4 MG: 4 INJECTION, SOLUTION INTRAMUSCULAR; INTRAVENOUS at 07:04

## 2023-09-11 RX ADMIN — SUGAMMADEX 200 MG: 100 INJECTION, SOLUTION INTRAVENOUS at 07:46

## 2023-09-11 RX ADMIN — SODIUM CHLORIDE: 9 INJECTION, SOLUTION INTRAVENOUS at 07:04

## 2023-09-11 RX ADMIN — SODIUM CHLORIDE 10 MG/HR: 9 INJECTION, SOLUTION INTRAVENOUS at 09:49

## 2023-09-11 RX ADMIN — HEPARIN SODIUM 16000 UNITS: 1000 INJECTION INTRAVENOUS; SUBCUTANEOUS at 07:32

## 2023-09-11 RX ADMIN — CEFAZOLIN SODIUM 2 G: 2 INJECTION, SOLUTION INTRAVENOUS at 07:04

## 2023-09-11 RX ADMIN — MUPIROCIN: 20 OINTMENT TOPICAL at 05:58

## 2023-09-11 RX ADMIN — FAMOTIDINE 20 MG: 20 TABLET, FILM COATED ORAL at 05:58

## 2023-09-11 RX ADMIN — Medication 200 MCG: at 07:38

## 2023-09-11 RX ADMIN — Medication 200 MCG: at 07:36

## 2023-09-11 RX ADMIN — SODIUM CHLORIDE 100 ML/HR: 9 INJECTION, SOLUTION INTRAVENOUS at 08:44

## 2023-09-11 RX ADMIN — OXYCODONE HYDROCHLORIDE AND ACETAMINOPHEN 1 TABLET: 10; 325 TABLET ORAL at 20:53

## 2023-09-11 RX ADMIN — ONDANSETRON 4 MG: 2 INJECTION INTRAMUSCULAR; INTRAVENOUS at 07:04

## 2023-09-11 RX ADMIN — LIDOCAINE HYDROCHLORIDE 50 MG: 10 INJECTION, SOLUTION EPIDURAL; INFILTRATION; INTRACAUDAL; PERINEURAL at 07:04

## 2023-09-11 RX ADMIN — PROTAMINE SULFATE 160 MG: 10 INJECTION, SOLUTION INTRAVENOUS at 07:46

## 2023-09-11 RX ADMIN — Medication 5 MG: at 21:24

## 2023-09-11 RX ADMIN — MORPHINE SULFATE 2 MG: 2 INJECTION, SOLUTION INTRAMUSCULAR; INTRAVENOUS at 09:18

## 2023-09-11 RX ADMIN — LIDOCAINE HYDROCHLORIDE 0.5 ML: 10 INJECTION, SOLUTION EPIDURAL; INFILTRATION; INTRACAUDAL; PERINEURAL at 05:58

## 2023-09-11 RX ADMIN — ETOMIDATE 20 MG: 20 INJECTION, SOLUTION INTRAVENOUS at 07:04

## 2023-09-11 NOTE — PROGRESS NOTES
INTENSIVIST   PROGRESS NOTE         SUBJECTIVE     Mr. Donn Robbins, 68 y.o. male is followed for: No chief complaint on file.    S/P TAVR  Perioperative medical management of comorbid conditions, including glycemic control and electrolytes disorders.    As an Intensivist, we provide an integrated approach to the ICU patient and family, medical management of comorbid conditions, including but not limited to electrolytes, glycemic control, organ dysfunction, lead interdisciplinary rounds and coordinate the care with all other services, including those from other specialists.     Interval History:  POD: Day of Surgery    He was seen in 2H ICU upon arrival from OR.    He was admitted on 9/11/2023 for an elective TAVR due to Aortic Stenosis. He has had progressive dyspnea and fatigue over the past year.    Events from surgery were reviewed.    At present, he is doing well.   Comfortable.   No distress.   No chest pain.    Family at bedside.    Temp  Min: 97.6 °F (36.4 °C)  Max: 97.7 °F (36.5 °C)     PMH: He  has a past medical history of Aortic stenosis, severe (08/02/2023), Arthritis, Gastric ulcer, High cholesterol, Hypertension, Kidney stones, and Lumbar herniated disc.   PSxH: He  has a past surgical history that includes Cardiac catheterization (N/A, 8/14/2017); Colonoscopy; Cardiac electrophysiology procedure (N/A, 12/4/2017); and Cardiac catheterization (N/A, 8/10/2023).      Medications:  No current facility-administered medications on file prior to encounter.     Current Outpatient Medications on File Prior to Encounter   Medication Sig    amLODIPine (NORVASC) 5 MG tablet Take 1 tablet by mouth Daily. for blood pressure    aspirin 81 MG EC tablet Take 1 tablet by mouth Daily.    atorvastatin (LIPITOR) 20 MG tablet Take 1 tablet by mouth Daily.    lisinopril (PRINIVIL,ZESTRIL) 30 MG tablet Take 1 tablet by mouth 2 (Two) Times a Day.    metoprolol succinate XL (TOPROL-XL) 50 MG 24 hr tablet Take 1 tablet by  mouth Daily. for blood pressure    oxyCODONE-acetaminophen (PERCOCET)  MG per tablet Take 1 tablet by mouth Every 12 (Twelve) Hours As Needed.    pramipexole (MIRAPEX) 0.5 MG tablet Take 1 tablet by mouth Daily.    albuterol sulfate  (90 Base) MCG/ACT inhaler Inhale 2 puffs See Admin Instructions. Inhale 2 puffs by mouth every 4 to 6 hours as needed    cloNIDine (CATAPRES) 0.1 MG tablet Take 1 tablet by mouth Every 6 (Six) Hours As Needed for High Blood Pressure.       Allergies: He has No Known Allergies.   FH: His family history includes Diabetes type II in his mother; Heart failure in his father; No Known Problems in his sister.   SH: He  reports that he has been smoking cigarettes. He has a 42.00 pack-year smoking history. He has never used smokeless tobacco. He reports current alcohol use of about 10.0 standard drinks per week. He reports current drug use. Frequency: 1.00 time per week. Drug: Marijuana.     The patient's relevant past medical, surgical and social history were reviewed and updated in Epic as appropriate.       History     Last Reviewed by Esvin Olmedo MD on 2023 at 11:18 AM    Sections Reviewed    Medical, Family, Surgical, Tobacco, Alcohol, Drug Use, Sexual Activity,   Social Documentation    Problem list reviewed by Esvin Olmedo MD on 2023 at 11:17 AM  Medicines reviewed by Esvin Olmedo MD on 2023 at 11:17 AM  Allergies reviewed by Esvin Olmedo MD on 2023 at 11:17 AM       ROS:   As per HPI.        OBJECTIVE     Vitals:  Temp: 97.7 °F (36.5 °C) (23) Temp  Min: 97.6 °F (36.4 °C)  Max: 97.7 °F (36.5 °C)   Temp core:      BP: 126/63 (23) BP  Min: 105/62  Max: 157/68   MAP (non-invasive) Noninvasive MAP (mmHg): 89 (23) Noninvasive MAP (mmHg)  Av.8  Min: 78  Max: 95   Pulse: 57 (23) Pulse  Min: 56  Max: 61   Resp: 16 (23 0900) Resp  Min: 16  Max: 18   SpO2: 93 % (23 0930) SpO2  Min: 88 %  Max:  98 %   Device: nasal cannula, humidified (09/11/23 0900)    Flow Rate: 2 (09/11/23 0900) Flow (L/min)  Min: 2  Max: 2     Medications (drips):  lactated ringers, Last Rate: 9 mL/hr (09/11/23 0659)  niCARdipine, Last Rate: 10 mg/hr (09/11/23 0949)        Physical Examination  Telemetry:  Rhythm: normal sinus rhythm (09/11/23 0815)         Constitutional:  No acute distress.   Cardiovascular: RRR.    Respiratory: Normal breath sounds  No adventitious sounds   Abdominal:  Soft with no tenderness.   Extremities: No Edema   Neurological:   Alert, Oriented, Cooperative.  Best Eye Response: 4-->(E4) spontaneous (09/11/23 0815)  Best Motor Response: 6-->(M6) obeys commands (09/11/23 0815)  Best Verbal Response: 5-->(V5) oriented (09/11/23 0815)  Denise Coma Scale Score: 15 (09/11/23 0815)     Hematology:  Results from last 7 days   Lab Units 09/11/23  0816 09/08/23  1054   WBC 10*3/mm3 9.63 11.79*   HEMOGLOBIN g/dL 14.8 15.1   MCV fL 95.1 94.8   PLATELETS 10*3/mm3 72* 85*     Results from last 7 days   Lab Units 09/08/23  1054   IMM GRAN % % 0.5   NEUTROS ABS 10*3/mm3 7.89*   LYMPHS ABS 10*3/mm3 2.32   MONOS ABS 10*3/mm3 1.09*   EOS ABS 10*3/mm3 0.35   BASOS ABS 10*3/mm3 0.08     Chemistry:  Estimated Creatinine Clearance: 71.4 mL/min (by C-G formula based on SCr of 1.1 mg/dL).    Results from last 7 days   Lab Units 09/11/23  0816 09/08/23  1054   SODIUM mmol/L 132* 140   POTASSIUM mmol/L 4.8 4.3   CHLORIDE mmol/L 101 105   CO2 mmol/L 20.0* 26.0   BUN mg/dL 24* 29*   CREATININE mg/dL 1.10 1.10   GLUCOSE mg/dL 101* 97     Results from last 7 days   Lab Units 09/11/23  0816 09/08/23  1054   CALCIUM mg/dL 8.0* 9.3   MAGNESIUM mg/dL  --  2.5*     Hepatic Panel:  Results from last 7 days   Lab Units 09/08/23  1054   ALBUMIN g/dL 4.6   BILIRUBIN mg/dL 0.4   AST (SGOT) U/L 31   ALT (SGPT) U/L 18   ALK PHOS U/L 108     Coagulation Labs:  Results from last 7 days   Lab Units 09/11/23  0816 09/08/23  1054   PROTIME Seconds  --   "13.4   INR   --  1.01   APTT seconds 37.2 30.2     Images:  No radiology results for the last day    Echo:  Results for orders placed during the hospital encounter of 08/10/23    Adult Transesophageal Echo (DARREN) W/ Cont if Necessary Per Protocol    Interpretation Summary    Left ventricular systolic function is normal. Estimated left ventricular EF = 60%    Moderate aortic valve regurgitation is present.    Severe aortic valve stenosis is present.  Peak velocity 429 cm/s, mean gradient 38 mmHg.  There is moderate aortic insufficiency.  Aortic annulus 2.5 cm, STJ 2.5 cm.    Mild mitral valve stenosis is present.  Mean gradient 8 mmHg.    Mild mitral regurgitation.    Trace tricuspid regurgitation.      Results: Reviewed.  I reviewed the patient's new laboratory and imaging results.  I independently reviewed the patient's new images.    Medications: Reviewed.      Assessment   A/P     Hospital:  LOS: 0 days   ICU: 3h     Active Hospital Problems    Diagnosis  POA    **Aortic valve disease [I35.9]  Yes     Echo (8/14/17): Normal LVEF.  Functional bicuspid valve with moderate AI/AS  Cardiac MR (8/25/17): LVEF 55%.  LVEDd 5.9 cm LVESd 4.1 cm.  Moderate AS/AI.  Moderate LA dilatation (55 mL/meter squared).  Normal RV size and function.  Focal gadolinium enhancement in the distal anterior segment.      I35.0, TAVR 09/11/23 [I35.0]  Yes     Added automatically from request for surgery [id number]       Donn \"Jahaira" is a 68 y.o. male admitted on 9/11/2023 with Aortic valve disease [I35.9]  Aortic stenosis, severe [I35.0]    Assessment/Management/Treatment Plan:    Aortic Stenosis, severe. Peak velocity 429 cm/s, mean gradient 38 mmHg.  Procedure(s) (LRB):  TRANSCATHETER AORTIC VALVE REPLACEMENT WITH DARREN (N/A)  Transfemoral Transcatheter Aortic Valve Replacement (N/A)  Dr. Amadeo Barnett (Cardiothoracic Surgery)  09/11/23     Cardiovascular  HTN  Tobacco use  Endocrine   Body mass index is 29.65 kg/m². Overweight: 25.0-29.9kg/m2 "     Lab Results   Lab Value Date/Time    HGBA1C 5.20 09/08/2023 1054    HGBA1C 5.30 08/10/2023 0925       Results from last 7 days   Lab Units 09/11/23  0804 09/11/23  0550   GLUCOSE mg/dL 100 101       Diet: Diet: Cardiac Diets, Diabetic Diets; Healthy Heart (2-3 Na+); Consistent Carbohydrate; Texture: Regular Texture (IDDSI 7); Fluid Consistency: Thin (IDDSI 0)   Advance Directives: Code Status and Medical Interventions:   Ordered at: 09/11/23 0814     Code Status (Patient has no pulse and is not breathing):    CPR (Attempt to Resuscitate)     Medical Interventions (Patient has pulse or is breathing):    Full Support        DVT prophylaxis:  Mechanical DVT prophylaxis orders are present.       In brief:  ICU post operative medical management.  Hemodynamic management. Adequate preload.  Nicardipine  titration for BP management.  Nicardipine continuous intravenous infusion as clinically indicated.  Urinary Output > = 0.5 mL/kg/hr  Watch for arrhythmias.  Watch for bleeding.  Insulin Sub Q as clinically indicated. Avoid oral antidiabetic medications. Target glucose < = 180 mg/dL.   Disposition: Admit to ICU    Plan of care and goals reviewed during interdisciplinary rounds.  I discussed the patient's findings and my recommendations with patient and nursing staff    MDM:    Problem(s) High due to: Acute or Chronic illness or injury that may poses a threat to life or bodily function  Data: Moderate due to: Review of prior external records from each unique source, Review or results of each unique test, and Ordering of each unique test  Risk: High due to: drug(s) requiring intensive monitoring for toxicity    High    [] Primary Attending Intensive Care Medicine - Nutrition Support   [x] Consultant    Copied text in this note has been reviewed and is accurate as of 09/11/23

## 2023-09-11 NOTE — PROGRESS NOTES
Structural Heart Team Meeting Summary         Patient Name: Donn Robbins  YOB: 1955     Referring Physician: Dr. Lakhani (PCP)  Admission Status: Inpatient     Attendees: Yogi Downs MD, Amadeo Barnett MD, Andrey Gibbs MD, Jace Mckeon MD, Ferrari Clinical Specialist, Alannah Mcclendon, Lenny Smith MD, and PHILIP García  Primary presentation of AS: Heart Failure   Heart Failure:   HFpEF   NYHA Functional Class: II   LVEF:  60%   ANNULUS Measurement: 2.5cm    Major Organ Compromise: N/A      Procedure Specific Impediment: N/A      Other Factors:   Major Nutritional Deficit: No  Cognitive Impairment: No  Oxygen dependent: No    STS Risk Score:   Mortality Risk: 2.8%  Mortality and Morbidity Risk: 13%    TAVR/TMVR Rationale: Frail, elderly male with severe symptomatic AS and low STS risk score for TAVR. Structural heart team in agreement to proceed with TAVR         Diagnostic Studies Discussed/ Reviewed: TTE, DARREN with moderate AI, LHC and carotid ultrasound unremarkable, EKG-NSR w/ H/O NSVT, CT with right iliac & superficial femoral artery plaque vs. Thrombus without high grade stenosis, PAT labs- BUN 29, magnesium 2.5, WBC 11.8, platelet 71423    Procedure planning details:   Valve Size: 23mm  Cardiology sheath access: Right femoral  CT Surgery sheath access: Left femoral    Post Procedure Considerations: Bleeding at groin sites, monitor for arrhythmias and s/s of CVA, monitor renal function & electrolytes, encourage early ambulation and pulmonary hygiene.

## 2023-09-11 NOTE — ANESTHESIA PROCEDURE NOTES
Structural Heart DARREN    Procedure Performed: Structural Heart DARREN       Start Time:        End Time:

## 2023-09-11 NOTE — ANESTHESIA PROCEDURE NOTES
Structural Heart DARREN    Procedure Performed: Structural Heart DARREN       Start Time:        End Time:      Preanesthesia Checklist:  Patient identified, IV assessed, risks and benefits discussed, monitors and equipment assessed, procedure being performed at surgeon's request and anesthesia consent obtained.    General Procedure Information  Diagnostic Indications for Echo:  assessment of ascending aorta, assessment of surgical repair and hemodynamic monitoring  Physician Requesting Echo: Amadeo Barnett MD  Location performed:  OR  Intubated  Bite block not placed  Heart visualized  Probe Insertion:  Easy  Probe Type:  Multiplane  Modalities:  Color flow mapping, continuous wave Doppler and pulse wave Doppler    Echocardiographic and Doppler Measurements    Ventricles    Right Ventricle:  Hypertrophy not present.  Thrombus not present.  Global function normal.    Left Ventricle:  Cavity size normal.  Thrombus not present.  Global Function normal.  Ejection Fraction 60%.          Valves    Aortic Valve:  Annulus normal and calcified.  Stenosis severe.  Area: 1.0 cm².  Mean Gradient: 31 mean mmHg.  Regurgitation moderate.  Leaflets thickened.  Leaflet motions normal.      Mitral Valve:  Annulus normal.  Stenosis moderate.  Leaflet motions normal.      Tricuspid Valve:  Leaflet motions normal.        Aorta    Ascending Aorta:  Size normal.  Dissection not present.  Plaque thickness less than 3 mm.  Mobile plaque not present.    Aortic Arch:  Size normal.  Dissection not present.  Plaque thickness less than 3 mm.  Mobile plaque not present.    Descending Aorta:  Size normal.  Dissection not present.  Plaque thickness less than 3 mm.  Mobile plaque not present.        Atria    Right Atrium:  Size normal.  Spontaneous echo contrast not present.  Thrombus not present.  Tumor not present.  Device present.    Left Atrium:  Size normal.  Spontaneous echo contrast not present.  Thrombus not present.  Tumor not present.  Left  atrial appendage normal.      Septa        Ventricular Septum:  Intra-ventricular septum morphology normal.      Diastolic Function Measurements:  Diastolic Dysfunction Grade=  E=  ms  A=  ms  E/A Ratio=  1.2  DT=  ms  S/D=   IVRT=    Other Findings  Pericardium:  normal  Pleural Effusion:  none  Pulmonary Arteries:  normal  Pulmonary Venous Flow:  normal    Anesthesia Information  Performed Personally  Anesthesiologist:  Lenyn Smith MD      Echocardiogram Comments:       Informed consent was obtained preoperatively.  Dominguez probe passed without difficulty.  Post TAVR:  EF unchanged; no NWMA.  Prosthetic AV well seated with trivial perivalvular jet.  Valve leaflets open and coapt well.  AVG 11 mean.  YAHAIRA 2.0 cm2.  No pericardial effusion seen.

## 2023-09-11 NOTE — CASE MANAGEMENT/SOCIAL WORK
Continued Stay Note  [facility]     Patient Name: Donn Robbins  MRN: [id number]  Today's Date: 9/11/2023    Admit Date: 9/11/2023    Plan: CM Eval   Discharge Plan       Row Name 09/11/23 1324       Plan    Plan CM Eval    Plan Comments Patient is the OR undergoing at TAVR at time of CM evaluation. According to the chart, patient lives in [facility] and is independent of ADLs at baseline. CM will follow post-op for needs/discharge planning. Anticipate discharge home  -[phone]    Final Discharge Disposition Code 01 - home or self-care                   Discharge Codes    No documentation.                       Zeny Lan RN

## 2023-09-11 NOTE — ANESTHESIA PROCEDURE NOTES
Airway  Urgency: elective    Date/Time: 9/11/2023 7:06 AM  Airway not difficult    General Information and Staff    Patient location during procedure: OR  CRNA/CAA: Del Phillips, JENIFFER    Indications and Patient Condition  Indications for airway management: airway protection    Preoxygenated: yes  MILS not maintained throughout  Mask difficulty assessment: 1 - vent by mask    Final Airway Details  Final airway type: endotracheal airway      Successful airway: ETT  Cuffed: yes   Successful intubation technique: direct laryngoscopy  Facilitating devices/methods: intubating stylet  Endotracheal tube insertion site: oral  Blade: Gerhard  Blade size: 3  ETT size (mm): 7.5  Cormack-Lehane Classification: grade I - full view of glottis  Placement verified by: chest auscultation and capnometry   Measured from: lips  ETT/EBT  to lips (cm): 22  Number of attempts at approach: 1  Assessment: lips, teeth, and gum same as pre-op and atraumatic intubation    Additional Comments  Negative epigastric sounds, Breath sound equal bilaterally with symmetric chest rise and fall

## 2023-09-11 NOTE — OP NOTE
PROCEDURE(S):   1. Right femoral arterial sheath placement.  2. Left femoral venous sheath placement.  3. Temporary transvenous pacemaker insertion.  4. Catheter placement in the aortic root.  5. Multiple intraprocedural aortograms.   6.Transcatheter aortic valve replacement with 23 mm Ferrari GAGE III tissue  valve.     INDICATIONS:   Severe symptomatic mixed aortic stenosis/regurgitation.      INTERVENTIONAL CARDIOLOGISTS:  Andrey Gibbs MD.    CARDIAC SURGEONS:   Amadeo Barnett M.D.    DESCRIPTION OF PROCEDURE:   After informed consent, the patient was brought to the OR in a fasting condition, prepped and draped from level of chin to knees by standard surgical technique.  Right femoral arterial access was obtained by percutaneous anterior wall puncture technique and a 6-Salvadorean arterial sheath was placed.  Left femoral venous access was obtained in similar fashion and an 8-Salvadorean venous sheath was placed. Temporary transvenous pacing electrode was inserted via the left femoral venous access and advanced to the right ventricular apex and excellent pacing/sensing was noted. A pigtail catheter was advanced from the right femoral arterial access and this was advanced to the aortic root, and multiple intraprocedural aortograms were performed.   At this time, left femoral arterial access was obtained by the surgeons and a 14 Salvadorean Ferrari sheath was placed(see separate note). Using this access, AL1 catheter, and straight wire, the aortic stenosis was crossed. The catheter was advanced into the left ventricle and the wire was exchanged for a Safari wire. At a 23 mm Ferrari GAGE III valve was advanced using standard delivery system. This was positioned under fluoroscopy. After the satisfactory position was confirmed, the valve was expanded under rapid pacing protocol. Satisfactory position was noted. Post implant images revealed trivial aortic insufficiency. No significant regurgitation was noted.  Subsequently the  delivery system was removed. The arterial sheath was removed and the access site was closed by the surgeons (see separate note). The patient tolerated the procedure well and without complications.    FINAL IMPRESSION:   Successful transcatheter aortic valve replacement with 23 mm Ferrari GAGE tissue valve.   No acute procedure-related complications.     Andrey Gibbs MD, FACC, [facility]

## 2023-09-11 NOTE — ANESTHESIA PROCEDURE NOTES
Arterial Line      Patient reassessed immediately prior to procedure    Patient location during procedure: pre-op   Line placed for hemodynamic monitoring.  Preanesthetic Checklist  Completed: patient identified, IV checked, site marked, risks and benefits discussed, surgical consent, monitors and equipment checked, pre-op evaluation and timeout performed  Arterial Line Prep    Sterile Tech: cap, gloves and sterile barriers  Prep: ChloraPrep  Patient monitoring: blood pressure monitoring, continuous pulse oximetry and EKG  Arterial Line Procedure   Laterality:right  Location:  radial artery  Catheter size: 20 G   Guidance: palpation technique  Number of attempts: 1  Successful placement: yes   Post Assessment   Dressing Type: line sutured, occlusive dressing applied, secured with tape and wrist guard applied.   Complications no  Circ/Move/Sens Assessment: normal and unchanged.   Patient Tolerance: patient tolerated the procedure well with no apparent complications

## 2023-09-11 NOTE — BRIEF OP NOTE
TRANSCATHETER AORTIC VALVE REPLACEMENT  Progress Note    Donn Robbins  9/11/2023    Pre-op Diagnosis:   Aortic valve disease [I35.9]  Aortic stenosis, severe [I35.0]       Post-Op Diagnosis Codes:     * Aortic valve disease [I35.9]     * Aortic stenosis, severe [I35.0]    Procedure/CPT® Codes:    Procedure(s):  TRANSCATHETER AORTIC VALVE REPLACEMENT WITH DARREN  Transfemoral Transcatheter Aortic Valve Replacement    Surgeon(s):  Amadeo Barnett MD Aslam, Azhar, MD    Anesthesia: General    Staff:   Circulator: Dottie Ley RN; Haase, Sherri L, RN  Perfusionist: Jace Marino  Radiology Technologist: Carmela Mccullough RT  Scrub Person: Adolfo Araya  Nursing Assistant: Corine Davidson  Assistant: Anisha Neely PA-C  Invasive Technologist: Jorge Leon; Melissa Baca  Assistant: Anisha Neely PA-C      Estimated Blood Loss: minimal    Urine Voided: * No values recorded between 9/11/2023  7:00 AM and 9/11/2023  7:49 AM *    Specimens:                None          Drains: * No LDAs found *    Complications: None    Assistant: Anisha Neely PA-C  was responsible for performing the following activities: Retraction and Placing Dressing and their skilled assistance was necessary for the success of this case.    Amadeo Barnett MD     Date: 9/11/2023  Time: 07:57 EDT

## 2023-09-11 NOTE — OP NOTE
DATE OF PROCEDURE: 9/11/2023      PREOPERATIVE DIAGNOSES:  1. Severe symptomatic aortic valve stenosis  2. Hypertension  3. Nonsustained ventricular tachycardia  4. Active tobacco abuse      POSTOPERATIVE DIAGNOSES:    1. Severe symptomatic aortic valve stenosis  2. Hypertension  3. Nonsustained ventricular tachycardia  4. Active tobacco abuse      PROCEDURE:    1. Percutaneous left common femoral arterial sheath placement  2. Aortogram  3. Transcatheter aortic valve replacement (23 mm Prieto 3 Ultra tissue valve)  4. Completion aortogram      SURGEON: Amadeo Barnett MD        Assistant: Anisha Neely PA-C was responsible for performing the following activities: Retraction and Placing Dressing and their skilled assistance was necessary for the success of this case.    CARDIOLOGIST: Andrey Gibbs MD    ANESTHESIA: General endotracheal anesthesia with Dr Lenny Smith MD      ESTIMATED BLOOD LOSS: Less than 25 mL      FLUOROSCOPY TIME: 7:30 with an exposure of 188 mGy      CONTRAST: 100 mL       INDICATIONS:  68-year-old  male with a history of hypertension, hyperlipidemia, nonsustained ventricular tachycardia and active tobacco abuse who presented with fatigue and shortness of breath in the setting of newly diagnosed aortic valve stenosis.  We discussed options including surgical aortic valve replacement, transcatheter aortic valve replacement and medical management.  After a shared decision making discussion, the patient wished to proceed with transcatheter aortic valve replacement.  The risks and benefits of surgery were discussed with the patient including pain, bleeding, infection, renal failure, stroke, heart block requiring a pacemaker, myocardial infarction and death. The patient understood these risks and wished to proceed with surgery.       DESCRIPTION OF PROCEDURE: The patient was taken to the operating room and placed under general endotracheal anesthesia. He was prepped and draped in the  usual sterile fashion. A timeout was performed including the patient’s name, procedure, and antibiotic administration. A right common femoral arterial line and left femoral venous line were placed by Dr. Gibbs for placement of a pigtail catheter and temporary venous pacer. Needle access of the left common femoral artery over the femoral head was obtained and the incision was enlarged using a #11 blade. Systemic heparin was administered. A 7-Monegasque dilator was then placed over the wire using modified Seldinger technique and 2 Perclose devices were deployed. A 7-Monegasque sheath was then placed followed by a 14-Monegasque sheath within the abdominal aorta. This was secured using a silk suture. An AL-1 catheter was utilized to cross the aortic valve with placement of a Safari wire in the left ventricle. The 23 mm GAGE 3 valve was placed in the correct position at the aortic annulus. The valve was deployed after rapid pacing and was found to be in satisfactory position with trivial paravalvular leak on echocardiogram. Aortogram was performed that revealed no aortic regurgitation. The sheath was removed and wire left in place.  The two Perclose devices were deployed with satisfactory hemostasis.  An aortogram was then performed that revealed intact vasculature.  The groin incision was then sealed with skin glue and the groin pigtail and pacing wire were removed. The sheaths were left in place and the patient was extubated in the operating room and transported to the cardiac ICU in stable condition.

## 2023-09-11 NOTE — PLAN OF CARE
Goal Outcome Evaluation:  Plan of Care Reviewed With: patient        Progress: improving  Outcome Evaluation: pt doing well after TAVR done today. cardene weaned off. percocet for pain. eating and using urinal without problem. groin sheaths removed / protocal. no bleeding or hemotoma.

## 2023-09-11 NOTE — ANESTHESIA POSTPROCEDURE EVALUATION
Patient: Donn Robbins    Procedure Summary       Date: 09/11/23 Room / Location: [facility] OR 01 /  [facility]    Anesthesia Start: 0659 Anesthesia Stop: 0805    Procedures:       TRANSCATHETER AORTIC VALVE REPLACEMENT WITH DARREN (Chest)      Transfemoral Transcatheter Aortic Valve Replacement Diagnosis:       Aortic valve disease      Aortic stenosis, severe      (Aortic valve disease [I35.9])      (Aortic stenosis, severe [I35.0])    Surgeons: Amadeo Barnett MD; Andrey Gibbs MD Provider: Lenny Smith MD    Anesthesia Type: general ASA Status: Not recorded            Anesthesia Type: general    Vitals  Vitals Value Taken Time   /50 09/11/23 0804   Temp     Pulse     Resp     SpO2 98 % 09/11/23 0804           Post Anesthesia Care and Evaluation    Patient location during evaluation: ICU  Patient participation: complete - patient participated  Level of consciousness: awake and alert  Pain score: 0  Pain management: adequate    Airway patency: patent  Anesthetic complications: No anesthetic complications  PONV Status: none  Cardiovascular status: hemodynamically stable and acceptable  Respiratory status: nonlabored ventilation, acceptable, nasal cannula and spontaneous ventilation  Hydration status: acceptable    Comments: Handoff given to ICU RN at bedside.

## 2023-09-11 NOTE — INTERVAL H&P NOTE
"[facility] Pre-op    Full history and physical note from office is attached.    /79 (BP Location: Left arm, Patient Position: Lying)   Pulse 57   Temp 97.6 °F (36.4 °C) (Tympanic)   Resp 18   Ht 172.7 cm (68\")   Wt 88.5 kg (195 lb)   SpO2 98%   BMI 29.65 kg/m²     LAB Results:  Lab Results   Component Value Date    WBC 11.79 (H) 09/08/2023    HGB 15.1 09/08/2023    HCT 45.6 09/08/2023    MCV 94.8 09/08/2023    PLT 85 (L) 09/08/2023    NEUTROABS 7.89 (H) 09/08/2023    GLUCOSE 97 09/08/2023    BUN 29 (H) 09/08/2023    CREATININE 1.10 09/08/2023    EGFRIFNONA 99 12/03/2017     09/08/2023    K 4.3 09/08/2023     09/08/2023    CO2 26.0 09/08/2023    MG 2.5 (H) 09/08/2023    CALCIUM 9.3 09/08/2023    ALBUMIN 4.6 09/08/2023    AST 31 09/08/2023    ALT 18 09/08/2023    BILITOT 0.4 09/08/2023    PTT 30.2 09/08/2023    INR 1.01 09/08/2023 8/22/23 CTA:  FINDINGS:  History indicates severe aortic stenosis. Unenhanced images show dense and extensive aortic valve calcification and moderate coronary artery calcification.     Angiographic images show maximal diameter of the ascending thoracic aorta to be approximately 3.0 cm. No dissection is seen. There is mild to moderate atherosclerotic calcification of the thoracoabdominal aorta and iliac vessels but no evidence of   significant aortoiliac stenosis or proximal superficial femoral stenosis. There are some areas of eccentric soft plaque formation or eccentric luminal thrombus present within both the right common iliac, external iliac and superficial femoral arteries.   There is no evidence of aneurysm.     No thrombus is seen in the atrial appendages. Contrast opacification of the pulmonary arteries is sufficient to exclude any large emboli. No mediastinal mass or adenopathy is seen. No pericardial or pleural effusion is identified. Lungs appear grossly   clear.     The osseous structures demonstrate some mild spondylosis without " evidence of acute fracture or aggressive osseous lesion. The liver, spleen, pancreas and bilateral adrenal glands appear normal. The kidneys are unremarkable. Small and large bowel loops   are nondilated. There is no free fluid or pneumoperitoneum. The pelvic viscera are unremarkable.      IMPRESSION:  1. CTA of the chest, abdomen and pelvis, with image data saved per TAVR protocol.  2. Dense calcification of the aortic valve leaflets. No evidence of thoracic aortic aneurysm or dissection. Multifocal eccentric filling defects are present within the right common iliac artery, external iliac artery and superficial femoral artery,   either reflecting eccentric soft plaque or luminal thrombus, but not resulting in high-grade stenosis.  3. No other evidence of acute disease in the chest, abdomen or pelvis.    8/10/23 heart cath:  Indications    Aortic valve disease [I35.9 (ICD-10-CM)]   Nonsustained ventricular tachycardia [I47.29 (ICD-10-CM)]   Essential hypertension [I10 (ICD-10-CM)]   Ventricular tachycardia (paroxysmal) [I47.29 (ICD-10-CM)]   Aortic stenosis, severe [I35.0 (ICD-10-CM)]         Conclusion    FINAL IMPRESSION:  Angiographically normal coronary arteries.     RECOMMENDATIONS:  Proceed to further evaluation and scheduling for TAVR.     Indications: Severe/symptomatic aortic stenosis.  Assessment of coronary anatomy before aortic valve replacement.    8/10/23 ECHO:  Clinical Indication    Valvular Disease   Dx: Aortic valve disease [I35.9 (ICD-10-CM)]; Nonsustained ventricular tachycardia [I47.29 (ICD-10-CM)]; Essential hypertension [I10 (ICD-10-CM)]; Ventricular tachycardia (paroxysmal) [I47.29 (ICD-10-CM)]; Aortic stenosis, severe [I35.0 (ICD-10-CM)]     Interpretation Summary         Left ventricular systolic function is normal. Estimated left ventricular EF = 60%    Moderate aortic valve regurgitation is present.    Severe aortic valve stenosis is present.  Peak velocity 429 cm/s, mean gradient 38  mmHg.  There is moderate aortic insufficiency.  Aortic annulus 2.5 cm, STJ 2.5 cm.    Mild mitral valve stenosis is present.  Mean gradient 8 mmHg.    Mild mitral regurgitation.    Trace tricuspid regurgitation.    Cancer Staging (if applicable)  Cancer Patient: __ yes __no __unknown__N/A; If yes, clinical stage T:__ N:__M:__, stage group or __N/A      Impression: Severe aortic stenosis       Plan: TRANSCATHETER AORTIC VALVE REPLACEMENT WITH DARREN       Carol Ann Munguia, PHILIP   9/11/2023   06:39 EDT

## 2023-09-12 ENCOUNTER — APPOINTMENT (OUTPATIENT)
Dept: CARDIOLOGY | Facility: HOSPITAL | Age: 68
DRG: 267 | End: 2023-09-12
Payer: MEDICARE

## 2023-09-12 VITALS
TEMPERATURE: 98.6 F | DIASTOLIC BLOOD PRESSURE: 69 MMHG | WEIGHT: 195.11 LBS | RESPIRATION RATE: 18 BRPM | SYSTOLIC BLOOD PRESSURE: 145 MMHG | BODY MASS INDEX: 29.57 KG/M2 | HEIGHT: 68 IN | OXYGEN SATURATION: 96 % | HEART RATE: 60 BPM

## 2023-09-12 LAB
ANION GAP SERPL CALCULATED.3IONS-SCNC: 12 MMOL/L (ref 5–15)
BH BB BLOOD EXPIRATION DATE: NORMAL
BH BB BLOOD EXPIRATION DATE: NORMAL
BH BB BLOOD TYPE BARCODE: 6200
BH BB BLOOD TYPE BARCODE: 6200
BH BB DISPENSE STATUS: NORMAL
BH BB DISPENSE STATUS: NORMAL
BH BB PRODUCT CODE: NORMAL
BH BB PRODUCT CODE: NORMAL
BH BB UNIT NUMBER: NORMAL
BH BB UNIT NUMBER: NORMAL
BUN SERPL-MCNC: 23 MG/DL (ref 8–23)
BUN/CREAT SERPL: 24.7 (ref 7–25)
CALCIUM SPEC-SCNC: 9.2 MG/DL (ref 8.6–10.5)
CHLORIDE SERPL-SCNC: 102 MMOL/L (ref 98–107)
CO2 SERPL-SCNC: 22 MMOL/L (ref 22–29)
CREAT SERPL-MCNC: 0.93 MG/DL (ref 0.76–1.27)
CROSSMATCH INTERPRETATION: NORMAL
CROSSMATCH INTERPRETATION: NORMAL
DEPRECATED RDW RBC AUTO: 45.4 FL (ref 37–54)
EGFRCR SERPLBLD CKD-EPI 2021: 90.6 ML/MIN/1.73
ERYTHROCYTE [DISTWIDTH] IN BLOOD BY AUTOMATED COUNT: 13.1 % (ref 12.3–15.4)
GLUCOSE SERPL-MCNC: 155 MG/DL (ref 65–99)
HCT VFR BLD AUTO: 42.8 % (ref 37.5–51)
HGB BLD-MCNC: 14.2 G/DL (ref 13–17.7)
MCH RBC QN AUTO: 31.3 PG (ref 26.6–33)
MCHC RBC AUTO-ENTMCNC: 33.2 G/DL (ref 31.5–35.7)
MCV RBC AUTO: 94.5 FL (ref 79–97)
PLATELET # BLD AUTO: 64 10*3/MM3 (ref 140–450)
POTASSIUM SERPL-SCNC: 4.9 MMOL/L (ref 3.5–5.2)
QT INTERVAL: 454 MS
QTC INTERVAL: 441 MS
RBC # BLD AUTO: 4.53 10*6/MM3 (ref 4.14–5.8)
SODIUM SERPL-SCNC: 136 MMOL/L (ref 136–145)
UNIT  ABO: NORMAL
UNIT  ABO: NORMAL
UNIT  RH: NORMAL
UNIT  RH: NORMAL
WBC NRBC COR # BLD: 22.51 10*3/MM3 (ref 3.4–10.8)

## 2023-09-12 PROCEDURE — 80048 BASIC METABOLIC PNL TOTAL CA: CPT | Performed by: PHYSICIAN ASSISTANT

## 2023-09-12 PROCEDURE — 93325 DOPPLER ECHO COLOR FLOW MAPG: CPT

## 2023-09-12 PROCEDURE — 93308 TTE F-UP OR LMTD: CPT

## 2023-09-12 PROCEDURE — 93005 ELECTROCARDIOGRAM TRACING: CPT | Performed by: PHYSICIAN ASSISTANT

## 2023-09-12 PROCEDURE — 85027 COMPLETE CBC AUTOMATED: CPT | Performed by: PHYSICIAN ASSISTANT

## 2023-09-12 PROCEDURE — 93325 DOPPLER ECHO COLOR FLOW MAPG: CPT | Performed by: INTERNAL MEDICINE

## 2023-09-12 PROCEDURE — 93321 DOPPLER ECHO F-UP/LMTD STD: CPT

## 2023-09-12 PROCEDURE — 99232 SBSQ HOSP IP/OBS MODERATE 35: CPT | Performed by: INTERNAL MEDICINE

## 2023-09-12 PROCEDURE — 93308 TTE F-UP OR LMTD: CPT | Performed by: INTERNAL MEDICINE

## 2023-09-12 PROCEDURE — 93010 ELECTROCARDIOGRAM REPORT: CPT | Performed by: INTERNAL MEDICINE

## 2023-09-12 PROCEDURE — 93321 DOPPLER ECHO F-UP/LMTD STD: CPT | Performed by: INTERNAL MEDICINE

## 2023-09-12 RX ADMIN — OXYCODONE HYDROCHLORIDE AND ACETAMINOPHEN 1 TABLET: 10; 325 TABLET ORAL at 08:37

## 2023-09-12 RX ADMIN — ASPIRIN 81 MG: 81 TABLET, COATED ORAL at 08:37

## 2023-09-12 RX ADMIN — OXYCODONE HYDROCHLORIDE AND ACETAMINOPHEN 1 TABLET: 10; 325 TABLET ORAL at 02:24

## 2023-09-12 NOTE — PROGRESS NOTES
"[facility]  IP Progress Note      Chief Complaint: Follow-up of VHD/CRF's    Subjective   Sitting up in bedside chair, feels well, no chest pain or shortness of breath.  Ambulated without difficulty.    Objective     Blood pressure 131/70, pulse 53, temperature 97.8 °F (36.6 °C), temperature source Oral, resp. rate 16, height 172.7 cm (68\"), weight 88.5 kg (195 lb), SpO2 93 %.     Intake/Output Summary (Last 24 hours) at 9/12/2023 0613  Last data filed at 9/12/2023 0537  Gross per 24 hour   Intake 1892 ml   Output 3650 ml   Net -1758 ml       Physical Exam:  General: No acute distress.   Neck: no JVD.  Chest:No respiratory distress, breath sounds are normal. No wheezes,  rhonchi or rales.  Cardiovascular: Normal S1 and S2, no murmur, gallop or rub.    Extremities: No edema.  Groins intact, no bleeding or hematoma.    Results Review:     I reviewed the patient's new clinical results.    Results from last 7 days   Lab Units 09/12/23  0247   WBC 10*3/mm3 22.51*   HEMOGLOBIN g/dL 14.2   HEMATOCRIT % 42.8   PLATELETS 10*3/mm3 64*     Results from last 7 days   Lab Units 09/12/23  0247 09/11/23  0816 09/08/23  1054   SODIUM mmol/L 136   < > 140   POTASSIUM mmol/L 4.9   < > 4.3   CHLORIDE mmol/L 102   < > 105   CO2 mmol/L 22.0   < > 26.0   BUN mg/dL 23   < > 29*   CREATININE mg/dL 0.93   < > 1.10   CALCIUM mg/dL 9.2   < > 9.3   BILIRUBIN mg/dL  --   --  0.4   ALK PHOS U/L  --   --  108   ALT (SGPT) U/L  --   --  18   AST (SGOT) U/L  --   --  31   GLUCOSE mg/dL 155*   < > 97    < > = values in this interval not displayed.     Results from last 7 days   Lab Units 09/12/23  0247   SODIUM mmol/L 136   POTASSIUM mmol/L 4.9   CHLORIDE mmol/L 102   CO2 mmol/L 22.0   BUN mg/dL 23   CREATININE mg/dL 0.93   GLUCOSE mg/dL 155*   CALCIUM mg/dL 9.2     Results from last 7 days   Lab Units 09/08/23  1054   INR  1.01     Lab Results   Component Value Date    TROPONINI <0.006 08/14/2017    TROPONINT " <0.010 09/16/2022                   Tele: Sinus Rythym      Assessment:  Severe aortic stenosis, status post TAVR with 23 mm valve, stable postprocedure course.  Significant CAD, normal ejection fraction.  Hypertension, controlled.  Dyslipidemia, on statin therapy.  Leukocytosis and thrombocytopenia.  Chronic stable conditions.  No evidence of fever or infection.    Plan:  Continue low-dose aspirin 80 mg daily and rest of the usual home medications  Okay to discharge to home from perioperative standpoint.  Check CBC in 1 week to follow-up on leukocytosis and thrombocytopenia.  He will follow-up with Dr. Barnett at his [place] office, with heart valve clinic and with Dr. Gibbs at [place] office.    Andrey Gibbs MD, FAC, [facility]

## 2023-09-12 NOTE — PROGRESS NOTES
Cardiothoracic Surgery Progress Note      POD # 1 s/p TAVR     LOS: 1 day      Subjective:  Up in chair, no complaints    Objective:  Vital Signs  Temp:  [97.7 °F (36.5 °C)-98.2 °F (36.8 °C)] 97.8 °F (36.6 °C)  Heart Rate:  [53-70] 53  Resp:  [16-18] 16  BP: (105-144)/(50-85) 131/70    Physical Exam:   General Appearance: alert, appears stated age and cooperative   Lungs: clear to auscultation, respirations regular, respirations even, and respirations unlabored   Heart: regular rhythm & normal rate, normal S1, S2, no murmur, no gallop, no rub, and no click   Skin: Incision c/d/i       Results:    Results from last 7 days   Lab Units 09/12/23  0247   WBC 10*3/mm3 22.51*   HEMOGLOBIN g/dL 14.2   HEMATOCRIT % 42.8   PLATELETS 10*3/mm3 64*     Results from last 7 days   Lab Units 09/12/23  0247   SODIUM mmol/L 136   POTASSIUM mmol/L 4.9   CHLORIDE mmol/L 102   CO2 mmol/L 22.0   BUN mg/dL 23   CREATININE mg/dL 0.93   GLUCOSE mg/dL 155*   CALCIUM mg/dL 9.2       Assessment:  POD # 1 s/p TAVR    Plan:  Ambulate  Pulmonary toilet  Discharge if all agree    Loulou Ng PA-C  09/12/23  06:45 EDT

## 2023-09-12 NOTE — PROGRESS NOTES
Pt. Referred for Phase II Cardiac Rehab. Staff discussed benefits of exercise, program protocol, and educational material provided. Teach back verified.  Patient refused Cardiac Rehab at this time. Staff gave [facility] home exercise benefits and guidelines. Teach back verified.

## 2023-09-13 LAB
BH CV ECHO MEAS - AO MAX PG: 37.9 MMHG
BH CV ECHO MEAS - AO MEAN PG: 20.4 MMHG
BH CV ECHO MEAS - AO V2 MAX: 306.8 CM/SEC
BH CV ECHO MEAS - AO V2 VTI: 74 CM
BH CV ECHO MEAS - AVA(I,D): 1.5 CM2
BH CV ECHO MEAS - EDV(CUBED): 132.7 ML
BH CV ECHO MEAS - EDV(MOD-SP2): 85.3 ML
BH CV ECHO MEAS - EDV(MOD-SP4): 82.5 ML
BH CV ECHO MEAS - EF(MOD-BP): 66 %
BH CV ECHO MEAS - EF(MOD-SP2): 65.3 %
BH CV ECHO MEAS - EF(MOD-SP4): 66.4 %
BH CV ECHO MEAS - ESV(CUBED): 32.8 ML
BH CV ECHO MEAS - ESV(MOD-SP2): 29.6 ML
BH CV ECHO MEAS - ESV(MOD-SP4): 27.7 ML
BH CV ECHO MEAS - FS: 37.3 %
BH CV ECHO MEAS - LV MAX PG: 7 MMHG
BH CV ECHO MEAS - LV MEAN PG: 4 MMHG
BH CV ECHO MEAS - LV V1 MAX: 132.7 CM/SEC
BH CV ECHO MEAS - LV V1 VTI: 35.4 CM
BH CV ECHO MEAS - LVIDD: 5.1 CM
BH CV ECHO MEAS - LVIDS: 3.2 CM
BH CV ECHO MEAS - LVOT AREA: 3.1 CM2
BH CV ECHO MEAS - LVOT DIAM: 2 CM
BH CV ECHO MEAS - SV(LVOT): 111.2 ML
BH CV ECHO MEAS - SV(MOD-SP2): 55.7 ML
BH CV ECHO MEAS - SV(MOD-SP4): 54.8 ML
BH CV VAS BP RIGHT ARM: ABNORMAL MMHG
LV EF 2D ECHO EST: 65 %

## 2023-09-18 ENCOUNTER — DOCUMENTATION (OUTPATIENT)
Dept: CARDIOLOGY | Facility: HOSPITAL | Age: 68
End: 2023-09-18
Payer: MEDICARE

## 2023-09-18 NOTE — PROGRESS NOTES
Called patient after TAVR on 9/11. Overall he reports feeling very well. He has been monitoring his BP, HR and weight daily-SBP trending low 109/115, has been holding his BP medications. Will discuss with PCP tomorrow. Groin site bruised without erythema, drainage or pain. We discussed lifted restrictions S/P TAVR- Ok to drive and lift >10lb today. Reviewed upcoming apts with Dr. Gibbs on 10/10, echo on 10/13 and CTS follow up on 10/17. No questions or concerns today. Will follow up later this week to discuss any changes from PCP apt on 9/19 and to re-assess BP readings. Patient is agreeable and he will call me or Dr. Gibbs if he has any questions or concerns.     9/22-Called patient to discuss PCP apt and medication changes. He reports feeling well, Bps are stable. Requested PCP note. No questions or concerns today, he has my number for future needs.

## 2023-09-25 NOTE — DISCHARGE SUMMARY
Discharge Summary Transcatheter Valve Replacement    Date of Admission: 9/11/2023  Date of Discharge: 9/12/2023    PCP: Alonso Lakhani PA  ATTENDING: Amadeo Barnett MD    Consults:   Consulting Physician(s)       Provider Relationship Specialty    Andrey Gibbs MD Consulting Physician Cardiology            Structural Heart Team  1.  Yogi Downs MD  2.  Amadeo Barnett MD  3.  Jarod Mcintyre MD  4.  Sourav Tinoco MD  5..  Andrey Gibbs MD  6.  Blanca Walker MD  7.  Jace Mckeon MD  8.  PHILIP García    Presenting Problem/ HPI: 68-year-old male with a history of hypertension, hyperlipidemia, nonsustained ventricular tachycardia and active tobacco abuse who presents with fatigue and shortness of breath in the setting of newly diagnosed aortic valve stenosis.  Over the past year, the patient notes fatigue and shortness of breath.  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  He denies chest pain, dizziness or syncope.     Discharge Diagnosis:     I35.0, TAVR 09/11/23    Aortic valve disease      Procedures Performed:   Procedure(s):  TRANSCATHETER AORTIC VALVE REPLACEMENT WITH DARREN  Transfemoral Transcatheter Aortic Valve Replacement    Hospital Course:The patient is an 68 y.o. male from with symptoms of severe aortic stenosis as noted above in the HPI.  An echocardiogram revealed the following:    Left ventricular systolic function is normal. Estimated left ventricular EF = 60%    Moderate aortic valve regurgitation is present.    Severe aortic valve stenosis is present.  Peak velocity 429 cm/s, mean gradient 38 mmHg.  There is moderate aortic insufficiency.  Aortic annulus 2.5 cm, STJ 2.5 cm.    Mild mitral valve stenosis is present.  Mean gradient 8 mmHg.    Mild mitral regurgitation.    Trace tricuspid regurgitation.       The patient was evaluated and deemed to be at greater risk of mortality with traditional open AVR primarily based upon  physical exam, laboratory studies, and imaging findings.   The patient was admitted the morning of the scheduled OR procedure.  9/11: Patient was taken to the operating room with Dr. Barnett and Dr. Gibbs for   1. Percutaneous left common femoral arterial sheath placement  2. Aortogram  3. Transcatheter aortic valve replacement (23 mm Prieto 3 Ultra tissue valve)  4. Completion aortogram  Details of the operation we found a separate operative note.  Patient tolerated the procedure well.  At the end of the case bilateral pedal pulses were intact.  Patient was extubated and transferred to ICU in stable condition.  The cardiology team continue to follow throughout the patient's admission.  9/12: Patient recovering appropriately.  Groin access sites without hematoma.  Patient was able to ambulate and void without issue.  Patient stable for discharge home.  All consulting teams agree.  Patient was given specific wound care instructions and a follow-up appointment in CT surgery and heart valve clinic.    Discharge Disposition  Home or Self Care    Discharge Medications     Discharge Medications        Continue These Medications        Instructions Start Date   albuterol sulfate  (90 Base) MCG/ACT inhaler  Commonly known as: PROVENTIL HFA;VENTOLIN HFA;PROAIR HFA   2 puffs, Inhalation, See Admin Instructions, Inhale 2 puffs by mouth every 4 to 6 hours as needed      amLODIPine 5 MG tablet  Commonly known as: NORVASC   5 mg, Oral, Daily, for blood pressure      aspirin 81 MG EC tablet   81 mg, Oral, Daily      atorvastatin 20 MG tablet  Commonly known as: LIPITOR   20 mg, Oral, Daily      cloNIDine 0.1 MG tablet  Commonly known as: CATAPRES   1 tablet, Oral, Every 6 Hours PRN      lisinopril 30 MG tablet  Commonly known as: PRINIVIL,ZESTRIL   30 mg, Oral, 2 Times Daily      metoprolol succinate XL 50 MG 24 hr tablet  Commonly known as: TOPROL-XL   50 mg, Oral, Daily, for blood pressure      oxyCODONE-acetaminophen   MG per tablet  Commonly known as: PERCOCET   1 tablet, Oral, Every 12 Hours PRN      pramipexole 0.5 MG tablet  Commonly known as: MIRAPEX   1 tablet, Oral, Daily               Discharge Diet: Regular cardiac diet    Activity at Discharge:   Activity Instructions       Bathing Restrictions      Showering is permitted. No tub baths, swimming pools, hot tubs until your surgeon approves.    Type of Restriction: Bathing    Bathing Restrictions: No Tub Bath    Driving Restrictions      Type of Restriction: Driving    Driving Restrictions: No Driving (Time Limited)    Length: 1 Week    Lifting Restrictions      Type of Restriction: Lifting    Lifting Restrictions: Other    Explain Lifing Restrictions: Do not lift anything heavier than 10 pounds for 1 week (a gallon of milk weighs 8 pounds).    Other Activity Instructions      Walking is one of the best ways to get   stronger after your TAVR. Start with a 5  minute walk 3-4 times a day. Walk a little   more each day.     Climbing stairs at a slow pace is okay            Special Instructions Following Valve Procedure   Check incision/groin sites daily. Clean daily with a clean washcloth, soap and water. Pat dry. Do not scrub. Wear loose fitting clothing over groin incision site until healed.  Shower:  May shower daily, but no tub baths, hot tubs or pools until Cardiac (CT) Surgeon approves.   Walk daily starting with a 5 minute walk four times daily.  Increase walking by 1-2 minutes per walk as tolerated. No strenuous activity until CT Surgeon approves.   No lifting over 10 lbs for 7 days.  No driving for 7 days unless your physician tells you otherwise.   Heart valve infection: Tell your doctors/dentists that your heart valve has been replaced before any procedures or dental work. You will be given a special wallet card at discharge to show your doctor/dentist. The card provides recommendations on when antibiotics are needed and the dose.    Dental care: Reduce your  risk of heart valve infection by brushing your teeth twice a day, using dental floss and seeing your dentist at least twice a year.   Monitor your heart rate, blood pressure and weight. Weigh yourself first thing in the morning wearing similar weight clothing. Report a weight gain of 3 pounds or more in a 24 hour period or 5 pounds in one week to your CT surgeon. Record your numbers and bring to your doctor appointments.  Report groin/incision site redness, drainage, swelling and/or significant tenderness, leg/feet swelling, chills and/or fever of 101 degrees at anytime or 100 for more than 24 hours, chest pain or increased shortness of breath to [facility] at [phone].   Call CT Surgery Office for all questions or concerns at [phone].     Follow-up Appointments  Future Appointments   Date Time Provider Department Center   10/3/2023  9:00 AM Andrey Gibbs MD [facility]   10/13/2023  9:00 AM [facility] ECHO/VAS/DARREN OP CART  COR NON COR   10/17/2023 11:30 AM Chelsey Chin APRN [facility]     Additional Instructions for the Follow-ups that You Need to Schedule       Cardiac Rehab Evaluation and Enrollment   Sep 17, 2023      Reason for Consult: Education        CBC & Differential    Sep 19, 2023 (Approximate)      Manual Differential: No   Release to patient: Routine Release        Call MD With Problems / Concerns   As directed      Monitor your heart rate, blood pressure,   and weight. Weigh yourself first thing in   the morning wearing similar weight   clothing. If you gain 3 pounds or more in   24 hours or 5 pounds or more in one   week, call your cardiac surgeon. Record   your numbers and bring to your doctor   appointments.   Call the cardiac surgery office for   groin/incision site redness, drainage,   swelling and/or significant tenderness,   leg swelling, chills and/or fever of 101   degrees or higher at any time or 100   degrees for more than 24 hours.    If you have  any of the following   CALL 911- Don't Drive   ~Chest pain or trouble breathing  ~Sudden numbness or weakness in your   face, arms, or legs   ~Shortness of breath that doesn't get better   when you rest  ~Heart rate faster than 120 beats per minute   with shortness of breath  ~Heart rate lower than 50 beats per minute   or a new irregular heart rate  ~Bowel movement that is dark black or   bright red    Order Comments: Monitor your heart rate, blood pressure, and weight. Weigh yourself first thing in the morning wearing similar weight clothing. If you gain 3 pounds or more in 24 hours or 5 pounds or more in one week, call your cardiac surgeon. Record your numbers and bring to your doctor appointments. Call the cardiac surgery office for groin/incision site redness, drainage, swelling and/or significant tenderness, leg swelling, chills and/or fever of 101 degrees or higher at any time or 100 degrees for more than 24 hours.  If you have any of the following CALL 911- Don't Drive ~Chest pain or trouble breathing ~Sudden numbness or weakness in your face, arms, or legs ~Shortness of breath that doesn't get better when you rest ~Heart rate faster than 120 beats per minute with shortness of breath ~Heart rate lower than 50 beats per minute or a new irregular heart rate ~Bowel movement that is dark black or bright red         Discharge Follow-up with Specialty: Cardiothoracic Surgery   As directed      Follow Up Details: Follow Up in 2-4 Weeks   Specialty: Cardiothoracic Surgery        Discharge Follow-up with Specialty: Dr Gibbs at [place] office; 6 Weeks   As directed      Specialty: Dr Gibbs at [place] office   Follow Up: 6 Weeks        Discharge Follow-up with Specialty: [facility]; 1 Week   As directed      Specialty: [facility]   Follow Up: 1 Week   Follow Up Details: Follow Up With [facility]ve Center Within 7 Days of Discharge. If Discharged on a Weekend, Schedule Follow Up For The  Following Friday at 0900.                Time spent for discharge: 30 minutes    Thank you for allowing the [facility] to participate in your care.    If you have any questions or concerns please call:     Cardiothoracic Surgery   Dr. Downs/Ericka/Francisco Javier/Alejandrina at [phone]    Cardiology  Dr. Walker/aSi at [phone]    Dr. Mckeon at [phone]    Loulou Ng PA-C  09/25/23  07:44 EDT

## 2023-09-26 NOTE — PROGRESS NOTES
[facility] Cardiology    Encounter Date: 10/03/2023    Patient ID: Donn Robbins is a 68 y.o. male.  : 1955     PCP: Alonso Lakhani PA       Chief Complaint: AORTIC VALVE DISEASE       PROBLEM LIST:  Aortic stenosis  Echo 2017: EF 60%, grade 1 diastolic dysfunction, aortic valve is functionally bicuspid as the noncoronary leaflet is immobile.  There is moderate aortic stenosis and moderate AI.  Mild MR  Echo 2019: EF 60%.  Aortic valve is calcified there is moderate aortic stenosis and moderate AI.  Echo 2022: EF 66-70%, moderate to severe aortic stenosis (YAHAIRA 1.2 cm², mean gradient 34, max gradient 62, peak velocity 394 cm/s)  Echo 3/8/2023: EF 61-65%, grade 1 diastolic dysfunction, severe aortic stenosis present (mean gradient 36.4 mmHg, max 68.5, YAHAIRA 1.33, peak velocity 413 cm/s)  Duplex Carotid Ultrasound 2023: Bilateral intimal thickening and mild scattered plaque is noted.   DARREN, 2023.  TAVR, 2023: 23 mm GAGE 3 pericardial prosthesis: No paravalvular leak is seen. Mean gradient 11.2 mmHg. Calculated aortic valve area 1.79 cm².   Echo, 2023: EF 65%. Mild MR. Trace TR. MG 20 mmHg.  Nonsustained ventricular tachycardia  LHC 8/10/2023: Normal coronary arteries with normal LV systolic function  Hypertension  Dyslipidemia  Tobacco use    History of Present Illness  Patient presents today for follow-up s/p TAVR with a history of aortic stenosis and NSVT, and cardiac risk factors. Since the procedure the patient has been doing well overall from a cardiovascular standpoint. He states his groin catheterization sites have healed well and he has returned to his regular activity level. Patient presents with an at home blood pressure log with an average of 120-130 mmHg systolic. He denies chest pain, shortness of breath, orthopnea, palpitations, edema, dizziness, and syncope.         No Known Allergies      Current Outpatient Medications:  "    albuterol sulfate  (90 Base) MCG/ACT inhaler, Inhale 2 puffs See Admin Instructions. Inhale 2 puffs by mouth every 4 to 6 hours as needed, Disp: , Rfl:     aspirin 81 MG EC tablet, Take 1 tablet by mouth Daily., Disp: , Rfl:     atorvastatin (LIPITOR) 20 MG tablet, Take 1 tablet by mouth Daily., Disp: , Rfl:     cloNIDine (CATAPRES) 0.1 MG tablet, Take 1 tablet by mouth Every 6 (Six) Hours As Needed for High Blood Pressure., Disp: , Rfl:     lisinopril (PRINIVIL,ZESTRIL) 20 MG tablet, Take 1.5 tablets by mouth 2 (Two) Times a Day., Disp: , Rfl:     metoprolol succinate XL (TOPROL-XL) 50 MG 24 hr tablet, Take 1 tablet by mouth Daily. for blood pressure, Disp: , Rfl: 5    oxyCODONE-acetaminophen (PERCOCET)  MG per tablet, Take 1 tablet by mouth Every 12 (Twelve) Hours As Needed., Disp: , Rfl:     pramipexole (MIRAPEX) 0.5 MG tablet, Take 1 tablet by mouth Daily., Disp: , Rfl:     The following portions of the patient's history were reviewed and updated as appropriate: allergies, current medications, past family history, past medical history, past social history, past surgical history and problem list.    ROS  Review of Systems   14 point ROS negative except for that listed in the HPI.         Objective:     /74 (BP Location: Left arm, Patient Position: Sitting)   Pulse 62   Ht 172.7 cm (68\")   Wt 89.8 kg (198 lb)   SpO2 98%   BMI 30.11 kg/m²      Physical Exam  Constitutional: Patient appears well-developed and well-nourished.   HENT: HEENT exam unremarkable.   Neck: Neck supple. No JVD present. No carotid bruits.   Cardiovascular: Normal rate, regular rhythm and normal heart sounds. Faint systolic murmur.  2+ symmetric pulses.   Pulmonary/Chest: Breath sounds normal. Does not exhibit tenderness.   Abdominal: Abdomen benign.   Musculoskeletal: Does not exhibit edema.   Neurological: Neurological exam unremarkable.   Vitals reviewed.    Data Review:   Lab Results   Component Value Date    " Aspirin Enteric Coated 81 mg oral delayed release tablet: 1 tab(s) orally once a day  enalapril 20 mg oral tablet: 1 tab(s) orally once a day  labetalol 200 mg oral tablet: 1 tab(s) orally 2 times a day  2 tabs in AM  1 tab in PM  Lexapro 20 mg oral tablet: 1 tab(s) orally once a day  Lipitor 40 mg oral tablet: 1 tab(s) orally once a day  Rhopressa 0.02% ophthalmic solution: 1 drop(s) to each affected eye once a day (in the evening)  timolol hemihydrate 0.5% ophthalmic solution: 1 drop(s) to each affected eye once a day (at bedtime)   GLUCOSE 155 (H) 09/12/2023    BUN 23 09/12/2023    CREATININE 0.93 09/12/2023    EGFR 90.6 09/12/2023    BCR 24.7 09/12/2023     09/12/2023    K 4.9 09/12/2023    CO2 22.0 09/12/2023    CALCIUM 9.2 09/12/2023    ALBUMIN 4.6 09/08/2023    AST 31 09/08/2023    ALT 18 09/08/2023     Lab Results   Component Value Date    CHOL 88 08/10/2023    TRIG 135 08/10/2023    HDL 29 (L) 08/10/2023    LDL 35 08/10/2023      Lab Results   Component Value Date    WBC 22.51 (H) 09/12/2023    RBC 4.53 09/12/2023    HGB 14.2 09/12/2023    HCT 42.8 09/12/2023    MCV 94.5 09/12/2023    PLT 64 (L) 09/12/2023     Lab Results   Component Value Date    HGBA1C 5.20 09/08/2023        Procedures             Assessment:      Diagnosis Plan   1. Aortic valve disease status post TAVR Stable and asymptomatic. Continue on aspirin 81 mg for antiplatelet therapy.    2. Nonsustained ventricular tachycardia, no recurrence Stable and asymptomatic. Continue on metoprolol 50 mg daily.  His coronaries and LV function are normal.     3. Essential hypertension  Well controlled 138/74 mmHg today. Continue on lisinopril 30 mg BID for hypertension. Continue on metoprolol 50 mg daily for rate control and hypertension.    4. Hyperlipidemia LDL goal <100  Well controlled. Last LDL 35 on 08/10/2023. Continue on atorvastatin 20 mg daily for hyperlipidemia.      Plan:   Stable cardiac status.  Significant improvement of symptoms following TAVR.  Heart healthy diet and regular exercise recommended.  Continue current medications.   FU in 6 MO, sooner as needed.  Thank you for allowing us to participate in the care of your patient.       Scribed for Andrey Gibbs MD by Mike Marquez. 10/3/2023 09:13 EDT    I, Andrey Gibbs MD, personally performed the services described in this documentation as scribed by the above named individual in my presence, and it is both accurate and complete.  10/4/2023  15:34 EDT      Please note that portions of this note may have been  completed with a voice recognition program. Efforts were made to edit the dictations, but occasionally words are mistranscribed.       Aspirin Enteric Coated 81 mg oral delayed release tablet: 1 tab(s) orally once a day  Cardiac Rehab : Cardiac Rehab for dx CAD s/p stent placement: 3 days/week x 12 weeks     Cardiologist: Dr. Soriano   enalapril 20 mg oral tablet: 1 tab(s) orally once a day  labetalol 200 mg oral tablet: 1 tab(s) orally 2 times a day  2 tabs in AM  1 tab in PM  Lexapro 20 mg oral tablet: 1 tab(s) orally once a day  Lipitor 40 mg oral tablet: 1 tab(s) orally once a day  Rhopressa 0.02% ophthalmic solution: 1 drop(s) to each affected eye once a day (in the evening)  timolol hemihydrate 0.5% ophthalmic solution: 1 drop(s) to each affected eye once a day (at bedtime)   Aspirin Enteric Coated 81 mg oral delayed release tablet: 1 tab(s) orally once a day  Cardiac Rehab : Cardiac Rehab for dx CAD s/p stent placement: 3 days/week x 12 weeks     Cardiologist: Dr. Soriano   clopidogrel 75 mg oral tablet: 1 tab(s) orally once a day  enalapril 20 mg oral tablet: 1 tab(s) orally once a day  labetalol 200 mg oral tablet: 1 tab(s) orally 2 times a day  2 tabs in AM  1 tab in PM  Lexapro 20 mg oral tablet: 1 tab(s) orally once a day  Lipitor 40 mg oral tablet: 1 tab(s) orally once a day  Rhopressa 0.02% ophthalmic solution: 1 drop(s) to each affected eye once a day (in the evening)  timolol hemihydrate 0.5% ophthalmic solution: 1 drop(s) to each affected eye once a day (at bedtime)   Aspirin Enteric Coated 81 mg oral delayed release tablet: 1 tab(s) orally once a day  Cardiac Rehab : Cardiac Rehab for dx CAD s/p stent placement: 3 days/week x 12 weeks     Cardiologist: Dr. Soriano   clopidogrel 75 mg oral tablet: 1 tab(s) orally once a day  enalapril 20 mg oral tablet: 1 tab(s) orally once a day  labetalol 200 mg oral tablet: 1 tab(s) orally 2 times a day  2 tabs in AM  1 tab in PM  Lexapro 20 mg oral tablet: 1 tab(s) orally once a day  Lipitor 80 mg oral tablet: 1 tab(s) orally once a day (at bedtime)   Rhopressa 0.02% ophthalmic solution: 1 drop(s) to each affected eye once a day (in the evening)  timolol hemihydrate 0.5% ophthalmic solution: 1 drop(s) to each affected eye once a day (at bedtime)

## 2023-09-28 LAB
BH CV ECHO MEAS - AO MAX PG: 24.9 MMHG
BH CV ECHO MEAS - AO MEAN PG: 11.2 MMHG
BH CV ECHO MEAS - AO V2 MAX: 249.7 CM/SEC
BH CV ECHO MEAS - AO V2 VTI: 55.6 CM
BH CV ECHO MEAS - AVA(I,D): 1.79 CM2
BH CV ECHO MEAS - LV MAX PG: 10.8 MMHG
BH CV ECHO MEAS - LV MEAN PG: 4.5 MMHG
BH CV ECHO MEAS - LV V1 MAX: 164.7 CM/SEC
BH CV ECHO MEAS - LV V1 VTI: 34.4 CM
BH CV ECHO MEAS - LVOT AREA: 2.9 CM2
BH CV ECHO MEAS - LVOT DIAM: 1.92 CM
BH CV ECHO MEAS - SV(LVOT): 99.6 ML
LV EF 2D ECHO EST: 55 %

## 2023-10-03 ENCOUNTER — OFFICE VISIT (OUTPATIENT)
Dept: CARDIOLOGY | Facility: CLINIC | Age: 68
End: 2023-10-03
Payer: MEDICARE

## 2023-10-03 VITALS
HEART RATE: 62 BPM | OXYGEN SATURATION: 98 % | DIASTOLIC BLOOD PRESSURE: 74 MMHG | SYSTOLIC BLOOD PRESSURE: 138 MMHG | HEIGHT: 68 IN | WEIGHT: 198 LBS | BODY MASS INDEX: 30.01 KG/M2

## 2023-10-03 DIAGNOSIS — I47.29 NONSUSTAINED VENTRICULAR TACHYCARDIA: ICD-10-CM

## 2023-10-03 DIAGNOSIS — I10 ESSENTIAL HYPERTENSION: ICD-10-CM

## 2023-10-03 DIAGNOSIS — E78.5 HYPERLIPIDEMIA LDL GOAL <100: ICD-10-CM

## 2023-10-03 DIAGNOSIS — I35.9 AORTIC VALVE DISEASE: Primary | ICD-10-CM

## 2023-10-03 DIAGNOSIS — Z72.0 TOBACCO ABUSE: ICD-10-CM

## 2023-11-14 ENCOUNTER — TELEPHONE (OUTPATIENT)
Dept: CARDIAC SURGERY | Facility: CLINIC | Age: 68
End: 2023-11-14

## 2023-11-14 NOTE — TELEPHONE ENCOUNTER
Called patient to inform him that all post op appointments are done at [facility]. Patient didn't want to make the drive to [place], I explained that all post op appointments for all patients can't be done at [facility] clinics. Patient stated he seen his cardiologist and primary care and said he was fine. I urged him to keep his post appointment but told him that if he didn't want to do so that he could follow up with his primary care. Patient stated he would like his appointment r/s. Reminder will be mailed.

## 2023-11-14 NOTE — TELEPHONE ENCOUNTER
"Caller: Donn Robbins \"Hai\"    Relationship to patient: Self    Best call back number: [phone]- MORNINGS.    Chief complaint: PT CALLING IN ASKING TO SHIFT POST OP APPT TO [place] OFFICE- PT STATES HE CANNOT DRIVE TO [place]    NO AVAILABILITY IN [place] - APRIL.     Type of visit: POST OP    Requested date: N/A     If rescheduling, when is the original appointment: 11/21/23     Additional notes:PLEASE CALL BACK PT- THANK YOU.    "

## 2023-11-20 ENCOUNTER — TELEPHONE (OUTPATIENT)
Dept: CARDIOLOGY | Facility: HOSPITAL | Age: 68
End: 2023-11-20
Payer: MEDICARE

## 2023-11-20 NOTE — TELEPHONE ENCOUNTER
Tried to reach pt to complete KCQ12 questionnaire. Phone number was not receiving calls at this time.

## 2023-12-11 DIAGNOSIS — I35.0 AORTIC STENOSIS, SEVERE: Primary | ICD-10-CM

## 2023-12-11 DIAGNOSIS — I35.9 AORTIC VALVE DISEASE: ICD-10-CM

## 2023-12-11 NOTE — PROGRESS NOTES
Prior TTE post-TAVR apt not completed. Discussed with patient, he requested that another order be placed for a TTE in [place] before the end of the year. Scheduling dept will call with date and time of echo in [place].

## 2023-12-27 ENCOUNTER — HOSPITAL ENCOUNTER (OUTPATIENT)
Dept: CARDIOLOGY | Facility: HOSPITAL | Age: 68
Discharge: HOME | End: 2023-12-27
Admitting: INTERNAL MEDICINE
Payer: MEDICARE

## 2023-12-27 DIAGNOSIS — I35.0 AORTIC STENOSIS, SEVERE: ICD-10-CM

## 2023-12-27 DIAGNOSIS — I35.9 AORTIC VALVE DISEASE: ICD-10-CM

## 2023-12-27 PROCEDURE — 93306 TTE W/DOPPLER COMPLETE: CPT

## 2023-12-31 LAB
BH CV ECHO MEAS - AO MAX PG: 32.8 MMHG
BH CV ECHO MEAS - AO MEAN PG: 17.5 MMHG
BH CV ECHO MEAS - AO ROOT DIAM: 3.1 CM
BH CV ECHO MEAS - AO V2 MAX: 286 CM/SEC
BH CV ECHO MEAS - AO V2 VTI: 62 CM
BH CV ECHO MEAS - AVA(I,D): 1.65 CM2
BH CV ECHO MEAS - EDV(CUBED): 142.6 ML
BH CV ECHO MEAS - EDV(MOD-SP4): 139 ML
BH CV ECHO MEAS - EF(MOD-SP4): 61.5 %
BH CV ECHO MEAS - ESV(CUBED): 33.9 ML
BH CV ECHO MEAS - ESV(MOD-SP4): 53.5 ML
BH CV ECHO MEAS - FS: 38.1 %
BH CV ECHO MEAS - IVS/LVPW: 1.02 CM
BH CV ECHO MEAS - IVSD: 1.03 CM
BH CV ECHO MEAS - LA DIMENSION: 3.7 CM
BH CV ECHO MEAS - LAT PEAK E' VEL: 9.4 CM/SEC
BH CV ECHO MEAS - LV DIASTOLIC VOL/BSA (35-75): 68.3 CM2
BH CV ECHO MEAS - LV MASS(C)D: 201.5 GRAMS
BH CV ECHO MEAS - LV MAX PG: 8.1 MMHG
BH CV ECHO MEAS - LV MEAN PG: 4 MMHG
BH CV ECHO MEAS - LV SYSTOLIC VOL/BSA (12-30): 26.3 CM2
BH CV ECHO MEAS - LV V1 MAX: 142 CM/SEC
BH CV ECHO MEAS - LV V1 VTI: 32.5 CM
BH CV ECHO MEAS - LVIDD: 5.2 CM
BH CV ECHO MEAS - LVIDS: 3.2 CM
BH CV ECHO MEAS - LVOT AREA: 3.1 CM2
BH CV ECHO MEAS - LVOT DIAM: 2 CM
BH CV ECHO MEAS - LVPWD: 1.01 CM
BH CV ECHO MEAS - MED PEAK E' VEL: 5.1 CM/SEC
BH CV ECHO MEAS - MV A MAX VEL: 139 CM/SEC
BH CV ECHO MEAS - MV DEC SLOPE: 302 CM/SEC2
BH CV ECHO MEAS - MV E MAX VEL: 123 CM/SEC
BH CV ECHO MEAS - MV E/A: 0.88
BH CV ECHO MEAS - MV MAX PG: 12.8 MMHG
BH CV ECHO MEAS - MV MEAN PG: 4 MMHG
BH CV ECHO MEAS - MV P1/2T: 147.4 MSEC
BH CV ECHO MEAS - MV V2 VTI: 63.2 CM
BH CV ECHO MEAS - MVA(P1/2T): 1.49 CM2
BH CV ECHO MEAS - MVA(VTI): 1.62 CM2
BH CV ECHO MEAS - PA ACC TIME: 0.07 SEC
BH CV ECHO MEAS - RAP SYSTOLE: 10 MMHG
BH CV ECHO MEAS - RVSP: 38.5 MMHG
BH CV ECHO MEAS - SI(MOD-SP4): 42 ML/M2
BH CV ECHO MEAS - SV(LVOT): 102.1 ML
BH CV ECHO MEAS - SV(MOD-SP4): 85.5 ML
BH CV ECHO MEAS - TAPSE (>1.6): 3.4 CM
BH CV ECHO MEAS - TR MAX PG: 28.5 MMHG
BH CV ECHO MEAS - TR MAX VEL: 267 CM/SEC
BH CV ECHO MEASUREMENTS AVERAGE E/E' RATIO: 16.97
LEFT ATRIUM VOLUME INDEX: 26.6 ML/M2

## 2024-06-03 NOTE — PROGRESS NOTES
[facility] Cardiology    Encounter Date: 2024    Patient ID: Donn Robbins is a 69 y.o. male.  : 1955     PCP: Alonso Lakhani PA       Chief Complaint: Aortic valve disease      PROBLEM LIST:  Aortic stenosis  Echo 2017: EF 60%, grade 1 diastolic dysfunction, aortic valve is functionally bicuspid as the noncoronary leaflet is immobile.  There is moderate aortic stenosis and moderate AI.  Mild MR  Echo 2019: EF 60%.  Aortic valve is calcified there is moderate aortic stenosis and moderate AI.  Echo 2022: EF 66-70%, moderate to severe aortic stenosis (YAHAIRA 1.2 cm², mean gradient 34, max gradient 62, peak velocity 394 cm/s)  Echo 3/8/2023: EF 61-65%, grade 1 diastolic dysfunction, severe aortic stenosis present (mean gradient 36.4 mmHg, max 68.5, YAHAIRA 1.33, peak velocity 413 cm/s)  Duplex Carotid Ultrasound 2023: Bilateral intimal thickening and mild scattered plaque is noted.   DARREN, 2023.  TAVR, 2023: 23 mm GAGE 3 pericardial prosthesis: No paravalvular leak is seen. Mean gradient 11.2 mmHg. Calculated aortic valve area 1.79 cm².   Echo, 2023: EF 65%. Mild MR. Trace TR. MG 20 mmHg.  Echo, 2023: EF 61-65%. There is a 23 mm TAVR valve present with mildly elevated peak and mean gradients of 17 mmHg) across the TAVR valve. There is mild, bileaflet mitral valve thickening. Mild MR/TR with mildly elevated RVSP.   Nonsustained ventricular tachycardia  [facility] 8/10/2023: Normal coronary arteries with normal LV systolic function  Hypertension  Dyslipidemia  Tobacco use    History of Present Illness  Patient presents today for a follow-up with a history of AVD, NSVT, and cardiac risk factors. Since last visit, patient has done well from cardiac standpoint.  He remains active with household chores and yard work.  He has a woodworking shop where he keeps himself occupied.  He continues to smoke 1 pack of cigarettes a day and finds it hard  "to quit.  Denies current chest pain shortness of breath edema palpitations dizziness or syncope.  States that his blood pressure remains well-controlled and he rarely has to take clonidine.  Also rarely uses his inhaler.    No Known Allergies      Current Outpatient Medications:     albuterol sulfate  (90 Base) MCG/ACT inhaler, Inhale 2 puffs See Admin Instructions. Inhale 2 puffs by mouth every 4 to 6 hours as needed, Disp: , Rfl:     aspirin 81 MG EC tablet, Take 1 tablet by mouth Daily., Disp: , Rfl:     atorvastatin (LIPITOR) 20 MG tablet, Take 1 tablet by mouth Daily., Disp: , Rfl:     cloNIDine (CATAPRES) 0.1 MG tablet, Take 1 tablet by mouth Every 6 (Six) Hours As Needed for High Blood Pressure., Disp: , Rfl:     lisinopril (PRINIVIL,ZESTRIL) 20 MG tablet, Take 1.5 tablets by mouth 2 (Two) Times a Day., Disp: , Rfl:     metoprolol succinate XL (TOPROL-XL) 50 MG 24 hr tablet, Take 1 tablet by mouth Daily. for blood pressure, Disp: , Rfl: 5    oxyCODONE-acetaminophen (PERCOCET)  MG per tablet, Take 1 tablet by mouth Every 12 (Twelve) Hours As Needed., Disp: , Rfl:     pramipexole (MIRAPEX) 0.5 MG tablet, Take 1 tablet by mouth Daily., Disp: , Rfl:     The following portions of the patient's history were reviewed and updated as appropriate: allergies, current medications, past family history, past medical history, past social history, past surgical history and problem list.    ROS  Review of Systems   14 point ROS negative except for that listed in the HPI.         Objective:     /84 (BP Location: Right arm, Patient Position: Sitting)   Pulse 63   Ht 172.7 cm (68\")   Wt 91.4 kg (201 lb 9.6 oz)   SpO2 94%   BMI 30.65 kg/m²      Physical Exam  Constitutional: Patient appears well-developed and well-nourished.   HENT: HEENT exam unremarkable.   Neck: Neck supple. No JVD present. No carotid bruits.   Cardiovascular: Normal rate, regular rhythm and normal heart sounds.  Faint systolic murmur.  "   2+ symmetric pulses.   Pulmonary/Chest: Breath sounds normal. Does not exhibit tenderness.   Abdominal: Abdomen benign.   Musculoskeletal: Does not exhibit edema.   Neurological: Neurological exam unremarkable.   Vitals reviewed.    Data Review:   Lab Results   Component Value Date    GLUCOSE 155 (H) 09/12/2023    BUN 23 09/12/2023    CREATININE 0.93 09/12/2023    EGFR 90.6 09/12/2023    BCR 24.7 09/12/2023     09/12/2023    K 4.9 09/12/2023    CO2 22.0 09/12/2023    CALCIUM 9.2 09/12/2023    ALBUMIN 4.6 09/08/2023    AST 31 09/08/2023    ALT 18 09/08/2023     Lab Results   Component Value Date    CHOL 88 08/10/2023    TRIG 135 08/10/2023    HDL 29 (L) 08/10/2023    LDL 35 08/10/2023      Lab Results   Component Value Date    WBC 22.51 (H) 09/12/2023    RBC 4.53 09/12/2023    HGB 14.2 09/12/2023    HCT 42.8 09/12/2023    MCV 94.5 09/12/2023    PLT 64 (L) 09/12/2023     Lab Results   Component Value Date    HGBA1C 5.20 09/08/2023        Procedures       Advance Care Planning   ACP discussion was declined by the patient. Patient does not have an advance directive, declines further assistance.           Assessment:      Diagnosis Plan   1. Aortic valve disease aortic stenosis, status post TAVR with acceptable echocardiographic findings, stable/asymptomatic. Continue to monitor clinically with future echocardiographic follow-up.  Continue aspirin for antiplatelet therapy      2. Nonsustained ventricular tachycardia  Normal coronary arteries, normal LV function, continue metoprolol.      3. Essential hypertension  Controlled, continue current antihypertensive therapy.      4. Dyslipidemia  Continue current statin therapy.  Follow-up with PCP for monitoring.   5.       Smoking                                                                          smoking cessation strongly recommended, patient counseled for more than 3 minutes.     Plan:   Stable cardiac status.  No angina or CHF symptoms.  Acceptable  echocardiographic follow-up findings.  Patient strongly advised to quit smoking.  Continue heart healthy diet and regular exercise.  Continue current medications.   FU in 6 MO, sooner as needed.  Thank you for allowing us to participate in the care of your patient.     Andrey Gibbs MD, FACC, [facility]      Please note that portions of this note may have been completed with a voice recognition program. Efforts were made to edit the dictations, but occasionally words are mistranscribed.

## 2024-06-11 ENCOUNTER — OFFICE VISIT (OUTPATIENT)
Dept: CARDIOLOGY | Facility: CLINIC | Age: 69
End: 2024-06-11
Payer: MEDICARE

## 2024-06-11 VITALS
BODY MASS INDEX: 30.55 KG/M2 | OXYGEN SATURATION: 94 % | WEIGHT: 201.6 LBS | HEART RATE: 63 BPM | SYSTOLIC BLOOD PRESSURE: 136 MMHG | HEIGHT: 68 IN | DIASTOLIC BLOOD PRESSURE: 84 MMHG

## 2024-06-11 DIAGNOSIS — I10 ESSENTIAL HYPERTENSION: ICD-10-CM

## 2024-06-11 DIAGNOSIS — I47.29 NONSUSTAINED VENTRICULAR TACHYCARDIA: ICD-10-CM

## 2024-06-11 DIAGNOSIS — E78.5 DYSLIPIDEMIA: ICD-10-CM

## 2024-06-11 DIAGNOSIS — I35.9 AORTIC VALVE DISEASE: Primary | ICD-10-CM

## 2024-06-11 PROCEDURE — 1160F RVW MEDS BY RX/DR IN RCRD: CPT | Performed by: INTERNAL MEDICINE

## 2024-06-11 PROCEDURE — 99214 OFFICE O/P EST MOD 30 MIN: CPT | Performed by: INTERNAL MEDICINE

## 2024-06-11 PROCEDURE — 1159F MED LIST DOCD IN RCRD: CPT | Performed by: INTERNAL MEDICINE

## 2024-06-11 PROCEDURE — 3079F DIAST BP 80-89 MM HG: CPT | Performed by: INTERNAL MEDICINE

## 2024-06-11 PROCEDURE — 3075F SYST BP GE 130 - 139MM HG: CPT | Performed by: INTERNAL MEDICINE

## 2024-07-02 DIAGNOSIS — I35.9 AORTIC VALVE DISEASE: Primary | ICD-10-CM

## 2024-07-02 DIAGNOSIS — I47.29 VENTRICULAR TACHYCARDIA (PAROXYSMAL): ICD-10-CM

## 2024-07-02 DIAGNOSIS — I35.0 AORTIC STENOSIS, SEVERE: ICD-10-CM

## 2024-09-11 ENCOUNTER — HOSPITAL ENCOUNTER (OUTPATIENT)
Dept: CARDIOLOGY | Facility: HOSPITAL | Age: 69
Discharge: HOME | End: 2024-09-11
Admitting: INTERNAL MEDICINE
Payer: MEDICARE

## 2024-09-11 DIAGNOSIS — I35.9 AORTIC VALVE DISEASE: ICD-10-CM

## 2024-09-11 DIAGNOSIS — I35.0 AORTIC STENOSIS, SEVERE: ICD-10-CM

## 2024-09-11 LAB
BH CV ECHO MEAS - AO MAX PG: 25.7 MMHG
BH CV ECHO MEAS - AO MEAN PG: 12.5 MMHG
BH CV ECHO MEAS - AO ROOT DIAM: 2.3 CM
BH CV ECHO MEAS - AO V2 MAX: 253.5 CM/SEC
BH CV ECHO MEAS - AO V2 VTI: 54.6 CM
BH CV ECHO MEAS - AVA(I,D): 0.97 CM2
BH CV ECHO MEAS - EDV(CUBED): 68.9 ML
BH CV ECHO MEAS - EDV(MOD-SP4): 37.1 ML
BH CV ECHO MEAS - EF(MOD-SP4): 62.3 %
BH CV ECHO MEAS - ESV(CUBED): 29.8 ML
BH CV ECHO MEAS - ESV(MOD-SP4): 14 ML
BH CV ECHO MEAS - FS: 24.4 %
BH CV ECHO MEAS - IVS/LVPW: 0.86 CM
BH CV ECHO MEAS - IVSD: 1.2 CM
BH CV ECHO MEAS - LA DIMENSION: 3.8 CM
BH CV ECHO MEAS - LAT PEAK E' VEL: 8.7 CM/SEC
BH CV ECHO MEAS - LV DIASTOLIC VOL/BSA (35-75): 18.1 CM2
BH CV ECHO MEAS - LV MASS(C)D: 193.5 GRAMS
BH CV ECHO MEAS - LV MAX PG: 4.4 MMHG
BH CV ECHO MEAS - LV MEAN PG: 3 MMHG
BH CV ECHO MEAS - LV SYSTOLIC VOL/BSA (12-30): 6.8 CM2
BH CV ECHO MEAS - LV V1 MAX: 105 CM/SEC
BH CV ECHO MEAS - LV V1 VTI: 26.2 CM
BH CV ECHO MEAS - LVIDD: 4.1 CM
BH CV ECHO MEAS - LVIDS: 3.1 CM
BH CV ECHO MEAS - LVOT AREA: 2.01 CM2
BH CV ECHO MEAS - LVOT DIAM: 1.6 CM
BH CV ECHO MEAS - LVPWD: 1.4 CM
BH CV ECHO MEAS - MED PEAK E' VEL: 7.7 CM/SEC
BH CV ECHO MEAS - MV A MAX VEL: 130 CM/SEC
BH CV ECHO MEAS - MV E MAX VEL: 115 CM/SEC
BH CV ECHO MEAS - MV E/A: 0.88
BH CV ECHO MEAS - SV(LVOT): 52.7 ML
BH CV ECHO MEAS - SV(MOD-SP4): 23.1 ML
BH CV ECHO MEAS - SVI(LVOT): 25.7 ML/M2
BH CV ECHO MEAS - SVI(MOD-SP4): 11.3 ML/M2
BH CV ECHO MEAS - TAPSE (>1.6): 2.31 CM
BH CV ECHO MEASUREMENTS AVERAGE E/E' RATIO: 14.02
LEFT ATRIUM VOLUME INDEX: 21.5 ML/M2

## 2024-09-11 PROCEDURE — 93306 TTE W/DOPPLER COMPLETE: CPT

## 2024-11-08 ENCOUNTER — DOCUMENTATION (OUTPATIENT)
Dept: CARDIOLOGY | Facility: HOSPITAL | Age: 69
End: 2024-11-08
Payer: MEDICARE

## 2024-11-08 ENCOUNTER — TELEPHONE (OUTPATIENT)
Dept: CARDIOLOGY | Facility: HOSPITAL | Age: 69
End: 2024-11-08
Payer: MEDICARE

## 2025-02-11 ENCOUNTER — TELEPHONE (OUTPATIENT)
Dept: CARDIOLOGY | Facility: CLINIC | Age: 70
End: 2025-02-11
Payer: MEDICARE

## 2025-02-11 NOTE — TELEPHONE ENCOUNTER
"    Caller: Donn Robbins \"Hai\"    Relationship to patient: Self    Best call back number: [phone]    Chief complaint: PT IS UNABLE TO MAKE IT TO HIS APPOINTMENT DUE TO WEATHER. PER HUB WORKFLOW, NO AVAILABILITY IN THE ALLOWED TIMEFRAME FOR RESCHEDULING. PLEASE REACH OUT TO SCHEDULE PT.     Type of visit: FOLLOW UP    Requested date: ASAP     If rescheduling, when is the original appointment: 02.11.25     Additional notes:PT HAS NO NEW OR WORSENING MEDICAL CONCERNS  "

## 2025-02-28 NOTE — PROGRESS NOTES
[facility] Cardiology    Encounter Date: 2025    Patient ID: Donn Robbins is a 69 y.o. male.  : 1955     PCP: Alonso Lakhani PA       Chief Complaint: Aortic valve disease and Hypertension      PROBLEM LIST:  Aortic stenosis  Echo 2017: EF 60%, grade 1 diastolic dysfunction, aortic valve is functionally bicuspid as the noncoronary leaflet is immobile.  There is moderate aortic stenosis and moderate AI.  Mild MR  Echo 2019: EF 60%.  Aortic valve is calcified there is moderate aortic stenosis and moderate AI.  Echo 2022: EF 66-70%, moderate to severe aortic stenosis (YAHAIRA 1.2 cm², mean gradient 34, max gradient 62, peak velocity 394 cm/s)  Echo 3/8/2023: EF 61-65%, grade 1 diastolic dysfunction, severe aortic stenosis present (mean gradient 36.4 mmHg, max 68.5, YAHAIRA 1.33, peak velocity 413 cm/s)  Duplex Carotid Ultrasound 2023: Bilateral intimal thickening and mild scattered plaque is noted.   DARREN, 2023.  TAVR, 2023: 23 mm GAGE 3 pericardial prosthesis: No paravalvular leak is seen. Mean gradient 11.2 mmHg. Calculated aortic valve area 1.79 cm².   Echo, 2023: EF 65%. Mild MR. Trace TR. MG 20 mmHg.  Echo, 2023: EF 61-65%. There is a 23 mm TAVR valve present with mildly elevated peak and mean gradients of 17 mmHg) across the TAVR valve. There is mild, bileaflet mitral valve thickening. Mild MR/TR with mildly elevated RVSP.   Echo, 2024: EF 61-65%. Mild concentric hypertrophy noted. Patient historically has a TAVR #23 GAGE 3 valve with acceptable gradient.  .Ao max PG 25.7 mmHg, Ao mean PG 12.5 mmHg. Mild thickening of mitral leaflets is noted, no mitral stenosis, trace MR. No pericardial effusion noted  Nonsustained ventricular tachycardia  [facility] 8/10/2023: Normal coronary arteries with normal LV systolic function  Hypertension  Dyslipidemia  Tobacco use    History of Present Illness  Patient presents today for a  follow-up with a history of aortic valve disease, nonsustained ventricular tachycardia, and cardiac risk factors. Since last visit, Patient has been doing well from a cardiac standpoint.  Active and busy in daily life but does not have a regular exercise routine.  Tolerates activity well. Patient denies any chest pain, shortness of air, palpitations, orthopnea, presyncope or syncope. Did have some mild swelling 3 months ago but this was relieved with two doses of Lasix. Has not had any recurrence. He does check his blood pressure at home once per week and states that it is usually 130s/80s.        No Known Allergies      Current Outpatient Medications:     albuterol sulfate  (90 Base) MCG/ACT inhaler, Inhale 2 puffs See Admin Instructions. Inhale 2 puffs by mouth every 4 to 6 hours as needed, Disp: , Rfl:     aspirin 81 MG EC tablet, Take 1 tablet by mouth Daily., Disp: , Rfl:     atorvastatin (LIPITOR) 20 MG tablet, Take 1 tablet by mouth Daily., Disp: , Rfl:     cloNIDine (CATAPRES) 0.1 MG tablet, Take 1 tablet by mouth Every 6 (Six) Hours As Needed for High Blood Pressure., Disp: , Rfl:     furosemide (LASIX) 20 MG tablet, Take 1 tablet by mouth As Needed., Disp: , Rfl:     lisinopril (PRINIVIL,ZESTRIL) 20 MG tablet, Take 1 tablet by mouth 2 (Two) Times a Day., Disp: , Rfl:     metoprolol succinate XL (TOPROL-XL) 50 MG 24 hr tablet, Take 1 tablet by mouth Daily. for blood pressure, Disp: , Rfl: 5    oxyCODONE-acetaminophen (PERCOCET)  MG per tablet, Take 1 tablet by mouth Every 12 (Twelve) Hours As Needed., Disp: , Rfl:     pramipexole (MIRAPEX) 0.5 MG tablet, Take 1 tablet by mouth Daily., Disp: , Rfl:     The following portions of the patient's history were reviewed and updated as appropriate: allergies, current medications, past family history, past medical history, past social history, past surgical history and problem list.    ROS  Review of Systems   14 point ROS negative except for that listed  "in the HPI.         Objective:     /76 (BP Location: Right arm, Patient Position: Sitting)   Pulse 99   Ht 172.7 cm (68\")   Wt 91.5 kg (201 lb 12.8 oz)   SpO2 95%   BMI 30.68 kg/m²      Physical Exam  Constitutional: Patient appears well-developed and well-nourished.   HENT: HEENT exam unremarkable.   Neck: Neck supple. No JVD present. No carotid bruits.   Cardiovascular: Normal rate, regular rhythm and normal heart sounds. No murmur heard.   2+ symmetric pulses.   Pulmonary/Chest: Breath sounds normal. Does not exhibit tenderness.    Musculoskeletal: Does not exhibit edema.   Neurological: Neurological exam unremarkable.   Vitals reviewed.    Data Review:   Lab Results   Component Value Date    GLUCOSE 155 (H) 09/12/2023    BUN 23 09/12/2023    CREATININE 0.93 09/12/2023    EGFR 90.6 09/12/2023    BCR 24.7 09/12/2023     09/12/2023    K 4.9 09/12/2023    CO2 22.0 09/12/2023    CALCIUM 9.2 09/12/2023    ALBUMIN 4.6 09/08/2023    AST 31 09/08/2023    ALT 18 09/08/2023     Lab Results   Component Value Date    CHOL 88 08/10/2023    TRIG 135 08/10/2023    HDL 29 (L) 08/10/2023    LDL 35 08/10/2023      Lab Results   Component Value Date    WBC 22.51 (H) 09/12/2023    RBC 4.53 09/12/2023    HGB 14.2 09/12/2023    HCT 42.8 09/12/2023    MCV 94.5 09/12/2023    PLT 64 (L) 09/12/2023     Lab Results   Component Value Date    HGBA1C 5.5 12/19/2024        Procedures     Advance Care Planning   ACP discussion was held with the patient during this visit. Patient does not have an advance directive, declines further assistance.             Assessment and plan:      Diagnosis Plan   1. Aortic valve disease  Echocardiogram from 9/2024 reviewed with the patient today.  Aortic valve is stable.  Will plan for repeat echocardiogram to reassess aortic valve prior to 1 year follow-up.    Adult Transthoracic Echo Complete w/ Color, Spectral and Contrast if necessary per protocol      2. Essential hypertension  Elevated in " the office today but well-controlled at home.  Discussed the importance of continuing to monitor blood pressure open he has been advised to call our office if systolic blood pressure is greater than 140.      3. Dyslipidemia  Continue Lipitor 20 mg daily.      4. Tobacco abuse  Patient continues to smoke a pack per day.  Discussed the CV risk of continued tobacco use and encouraged cessation.  Patient would like to quit smoking and he will try the weaning method.        Stable cardiac status.   Continue current medications.   FU in 12 MO, sooner as needed.  Thank you for allowing us to participate in the care of your patient.         Carmela Tang PA-C      Please note that portions of this note may have been completed with a voice recognition program. Efforts were made to edit the dictations, but occasionally words are mistranscribed.

## 2025-03-04 ENCOUNTER — OFFICE VISIT (OUTPATIENT)
Age: 70
End: 2025-03-04
Payer: MEDICARE

## 2025-03-04 VITALS
HEIGHT: 68 IN | HEART RATE: 99 BPM | BODY MASS INDEX: 30.58 KG/M2 | WEIGHT: 201.8 LBS | DIASTOLIC BLOOD PRESSURE: 76 MMHG | OXYGEN SATURATION: 95 % | SYSTOLIC BLOOD PRESSURE: 144 MMHG

## 2025-03-04 DIAGNOSIS — Z72.0 TOBACCO ABUSE: ICD-10-CM

## 2025-03-04 DIAGNOSIS — I35.9 AORTIC VALVE DISEASE: Primary | ICD-10-CM

## 2025-03-04 DIAGNOSIS — I10 ESSENTIAL HYPERTENSION: ICD-10-CM

## 2025-03-04 DIAGNOSIS — E78.5 DYSLIPIDEMIA: ICD-10-CM

## 2025-03-04 DIAGNOSIS — I35.0 AORTIC VALVE STENOSIS, ETIOLOGY OF CARDIAC VALVE DISEASE UNSPECIFIED: ICD-10-CM

## 2025-03-04 DIAGNOSIS — Z72.0 TOBACCO USE: ICD-10-CM

## 2025-03-04 PROCEDURE — 99214 OFFICE O/P EST MOD 30 MIN: CPT

## 2025-03-04 PROCEDURE — 3077F SYST BP >= 140 MM HG: CPT

## 2025-03-04 PROCEDURE — 3078F DIAST BP <80 MM HG: CPT

## 2025-03-04 PROCEDURE — 1159F MED LIST DOCD IN RCRD: CPT

## 2025-03-04 PROCEDURE — 1160F RVW MEDS BY RX/DR IN RCRD: CPT

## 2025-03-04 RX ORDER — FUROSEMIDE 20 MG/1
20 TABLET ORAL AS NEEDED
COMMUNITY
Start: 2025-01-20

## (undated) DEVICE — CATH DIAG EXPO .056 FL3.5 6F 100CM

## (undated) DEVICE — ELECTRD BLD EZ CLN STD 2.5IN

## (undated) DEVICE — CATH PACE PACEL BIPOL 5F110CM

## (undated) DEVICE — LHK- LEFT HEART KIT BAPTIST: Brand: MEDLINE INDUSTRIES, INC.

## (undated) DEVICE — PINNACLE INTRODUCER SHEATH: Brand: PINNACLE

## (undated) DEVICE — PK CATH CARD 10

## (undated) DEVICE — PK MINOR SPLT 10

## (undated) DEVICE — PROVIDES A STERILE INTERFACE BETWEEN THE OPERATING ROOM SURGICAL LAMPS (NON-STERILE) AND THE SURGEON OR NURSE (STERILE).: Brand: STERION®CLAMP COVER FABRIC

## (undated) DEVICE — MODEL BT2000 P/N 700287-012KIT CONTENTS: MANIFOLD WITH SALINE AND CONTRAST PORTS, SALINE TUBING WITH SPIKE AND HAND SYRINGE, TRANSDUCER: Brand: BT2000 AUTOMATED MANIFOLD KIT

## (undated) DEVICE — DEV COMPR RADL PRELUDESYNCEZ 30ML 32CM

## (undated) DEVICE — GUIDE CATHETER: Brand: MACH1™

## (undated) DEVICE — CATH DIAG EXPO .056 AL1 6F 100CM

## (undated) DEVICE — SYR LUERLOK 50ML

## (undated) DEVICE — ST INF PRI SMRTSTE 20DRP 2VLV 24ML 117

## (undated) DEVICE — DEV COMP RAD PRELUDESYNC 24CM

## (undated) DEVICE — CATH DIAG EXPO M/ PK 6FR FL4/FR4 PIG 3PK

## (undated) DEVICE — SUT PROLN 7/0 BV1 24IN 4PK M8702

## (undated) DEVICE — ADHS SKIN PREMIERPRO EXOFIN TOPICAL HI/VISC .5ML

## (undated) DEVICE — SENSR CERBRL O2 PK/2

## (undated) DEVICE — SUT SILK 2/0 TIES 18IN A185H

## (undated) DEVICE — CATH DIAG EXPO .045 FL3  5F 100CM

## (undated) DEVICE — Device

## (undated) DEVICE — GLIDEX™ COATED HYDROPHILIC GUIDEWIRE: Brand: MAGIC TORQUE™

## (undated) DEVICE — CANN NASL CO2 DIVIDED A/

## (undated) DEVICE — CATH GUIDE BERN 5F 65CM

## (undated) DEVICE — PATIENT RETURN ELECTRODE, SINGLE-USE, CONTACT QUALITY MONITORING, ADULT, WITH 9FT CORD, FOR PATIENTS WEIGING OVER 33LBS. (15KG): Brand: MEGADYNE

## (undated) DEVICE — PENCL ROCKRSWCH MEGADYNE W/HOLSTR 10FT SS

## (undated) DEVICE — ST EXT IV SMARTSITE 2VLV SP M LL 5ML IV1

## (undated) DEVICE — BOOT SUT XRAY DETECT STD YEL/BLU CA/50

## (undated) DEVICE — PK PERFUS CUST W/CARDIOPLEGIA

## (undated) DEVICE — GLV SURG BIOGELULTRATOUCH POLYISPRN PF LF SZ7 STRL

## (undated) DEVICE — INTRO SHEATH ENGAGE W/50 GW .038 7F12

## (undated) DEVICE — SYR LUERLOK 30CC

## (undated) DEVICE — INTRO SHEATH PRELUDE IDEAL SPRNG COIL 021 6F 23X80CM

## (undated) DEVICE — SHROD PENCL MEGADYNE ATTACHAVAC W/CONN/22MM

## (undated) DEVICE — DECANTER: Brand: UNBRANDED

## (undated) DEVICE — HDRST POSTIN FM CRDL TRACH SLOT NONCOMRESS 9X8X4IN

## (undated) DEVICE — MODEL AT P65, P/N 701554-001KIT CONTENTS: HAND CONTROLLER, 3-WAY HIGH-PRESSURE STOPCOCK WITH ROTATING END AND PREMIUM HIGH-PRESSURE TUBING: Brand: ANGIOTOUCH® KIT

## (undated) DEVICE — LEX ELECTRO PHYSIOLOGY: Brand: MEDLINE INDUSTRIES, INC.

## (undated) DEVICE — SUCTION CANISTER 2500CC: Brand: DEROYAL

## (undated) DEVICE — TBG PENCL TELESCP MEGADYNE SMOKE EVAC 10FT

## (undated) DEVICE — LIMB HOLDERS: Brand: DEROYAL

## (undated) DEVICE — ANGIOGRAPHIC CATHETER: Brand: EXPO™

## (undated) DEVICE — GOWN,NON-REINFORCED,SIRUS,SET IN SLV,XL: Brand: MEDLINE

## (undated) DEVICE — SI AVANTI+ 7F STD W/GW  NO OBT: Brand: AVANTI

## (undated) DEVICE — GLIDESHEATH BASIC HYDROPHILIC COATED INTRODUCER SHEATH: Brand: GLIDESHEATH

## (undated) DEVICE — SLV REPOSTNG CATH STRL 60CM

## (undated) DEVICE — SUT PROLN 6/0 C1 D/A 30IN 8706H

## (undated) DEVICE — COVER,MAYO STAND,XL,STERILE: Brand: MEDLINE

## (undated) DEVICE — DECANT BG O JET

## (undated) DEVICE — GW AMPLTZ SUPERSTIFF STR .035IN 180CM

## (undated) DEVICE — PAD ARMBRD SURG CONVOL 7.5X20X2IN

## (undated) DEVICE — SET PRIMARY GRVTY 10DP MALE LL 104IN

## (undated) DEVICE — SHEET, DRAPE, SPLIT, STERILE: Brand: MEDLINE

## (undated) DEVICE — GW SAFARI2 PRESH XSM CRV .035IN 3.2X2.9X275CM

## (undated) DEVICE — COVER,TABLE,HVY DUTY,60"X90",STRL: Brand: MEDLINE

## (undated) DEVICE — PCH INST SURG INVISISHIELD 2PCKT

## (undated) DEVICE — APPL CHLORAPREP TINTED 26ML TEAL

## (undated) DEVICE — SOL NACL 0.9PCT 1000ML

## (undated) DEVICE — BLANKT WARM UNDER/BDY A/ 100X195CM DISP LF

## (undated) DEVICE — 3M™ STERI-DRAPE™ FLUOROSCOPE DRAPE, 10 PER CARTON / 4 CARTONS PER CASE, 1012: Brand: STERI-DRAPE™

## (undated) DEVICE — ADULT, W/LG. BACK PAD, RADIOTRANSPARENT ELEMENT AND LEAD WIRE: Brand: DEFIBRILLATION ELECTRODES

## (undated) DEVICE — CABL PACE ATRIAL PT BLU

## (undated) DEVICE — LN INJ CONTRST FLXCIL HP F/M LL 1200PSI48

## (undated) DEVICE — GW PERIPH VASC ADX J/TP SS .035 150CM 3MM

## (undated) DEVICE — PK ATS CUST W CARDIOTOMY RESEVOIR

## (undated) DEVICE — ANTIBACTERIAL UNDYED BRAIDED (POLYGLACTIN 910), SYNTHETIC ABSORBABLE SUTURE: Brand: COATED VICRYL

## (undated) DEVICE — PERCLOSE™ PROSTYLE™ SUTURE-MEDIATED CLOSURE AND REPAIR SYSTEM: Brand: PERCLOSE™ PROSTYLE™

## (undated) DEVICE — CATH QUAD CRD 6F5MM

## (undated) DEVICE — SUT SILK 3/0 TIES 18IN A184H

## (undated) DEVICE — BOWL UTIL STRL 32OZ

## (undated) DEVICE — CATH DIAG EXPO M/ PK 5F FL4/FR4 PIG

## (undated) DEVICE — DECANTER BAG 9": Brand: MEDLINE INDUSTRIES, INC.

## (undated) DEVICE — 3M™ TEGADERM™ HIGH PERFORMANCE FOAM ADHESIVE DRESSING 90619, HEELDESIGN, 5 EACH/CARTON, 4 CARTON/CASE: Brand: 3M™ TEGADERM™

## (undated) DEVICE — 3M™ IOBAN™ 2 ANTIMICROBIAL INCISE DRAPE 6651EZ: Brand: IOBAN™ 2

## (undated) DEVICE — NDL PERC 1PRT THNWALL W/BASEPLT 18G 7CM

## (undated) DEVICE — SUT MNCRYL PLS ANTIB UD 4/0 PS2 18IN

## (undated) DEVICE — DRSNG SURESITE WNDW 2.38X2.75

## (undated) DEVICE — GW CERBRL FC PTFE STD/STR .035 260CM

## (undated) DEVICE — ELECTRD DEFIB M/FUNC PROPADZ STRL 2PK

## (undated) DEVICE — Device: Brand: MEDEX

## (undated) DEVICE — DRSNG SURESITE123 4X4.8IN

## (undated) DEVICE — GW INQWIRE FC PTFE STD J/1.5 .035 260

## (undated) DEVICE — SUT SILK 4/0 TIES 18IN A183H

## (undated) DEVICE — MODEL AT P54, P/N 700608-035KIT CONTENTS: HAND CONTROLLER, 3-WAY HIGH-PRESSURE STOPCOCK WITH ROTATING END AND PREMIUM HIGH-PRESSURE TUBING: Brand: ANGIOTOUCH® KIT

## (undated) DEVICE — A2000 MULTI-USE SYRINGE KIT, P/N 701277-003KIT CONTENTS: 100ML CONTRAST RESERVOIR AND TUBING WITH CONTRAST SPIKE AND CLAMP: Brand: A2000 MULTI-USE SYRINGE KIT

## (undated) DEVICE — TRAP FLD MINIVAC MEGADYNE 100ML

## (undated) DEVICE — INTENDED FOR TISSUE SEPARATION, AND OTHER PROCEDURES THAT REQUIRE A SHARP SURGICAL BLADE TO PUNCTURE OR CUT.: Brand: BARD-PARKER ® STAINLESS STEEL BLADES

## (undated) DEVICE — RADIFOCUS GLIDEWIRE: Brand: GLIDEWIRE

## (undated) DEVICE — SUT PROLN 4/0 BB D/A 36IN 8581H